# Patient Record
Sex: FEMALE | Race: WHITE | Employment: OTHER | URBAN - NONMETROPOLITAN AREA
[De-identification: names, ages, dates, MRNs, and addresses within clinical notes are randomized per-mention and may not be internally consistent; named-entity substitution may affect disease eponyms.]

---

## 2018-01-15 NOTE — LETTER
January 24, 2018      Sharon White  [address] [facility] [address]    Dear ,      I am happy to inform you that your recent cervical cancer screening test (PAP smear) was normal.      Preventative screenings such as this help to ensure your health for years to come. You should repeat a pap smear in 3 years, unless otherwise directed.      You will still need to return to the clinic every year for your annual exam and other preventive tests.     Please contact the clinic at [phone] if you have further questions.       Sincerely,      Mariposa Resendiz DO/[facility]       [place] Pre-Admission Testing Department  Phone: [phone]  Right Fax: [phone]  EVALUATION REQUEST PREOP    Sent By:  Farhana Bañuelos Date: 25    Patient Name: Юлия Charles  Surgery Date: 2025    CSN: [id number]  Medical Record: [id number]   : 1960 - A: 65 y      Sex: female    Surgeon(s):  YOVANA Gutierrez, Amadeo Alex MD    Procedure: anterior cervical 4- CERVICAL 5, cervical 5-CERVICAL 6, and cervical 6-CERVICAL 7 discectomy and fusion with cage, bone graft, plate/ screws; INTRAOPERATIVE NEURO MONITORING (BECCA) lumboperitoneal shunt ligation LEFT (DIMITRI), Right - Spine Cervical  INTRAOPERATIVE NEURO MONITORING, Right  lumboperitoneal shunt ligation, Left    Procedure Comments: anterior cervical 4- CERVICAL 5, cervical 5-CERVICAL 6, and cervical 6-CERVICAL 7 discectomy and fusion with cage, bone graft, plate/ screws; INTRAOPERATIVE NEURO MONITORING (BECCA) lumboperitoneal shunt ligation LEFT (DIMITRI)  Anesthesia Type: General      To PCP:        Fax #: [phone]      YOU SHOULD RECEIVE 1 PAGES (INCLUDING COVER SHEET)    ANESTHESIOLOGIST  HAS REQUESTED THE FOLLOWING:  NOTE OF MEDICAL CLEARANCE. Pt pre op lab w K+ 3.2-Please comment  _______________________________________________________________  Please note the following attached testing results for your review:   None    [facility]  [address] [place]  [address]  Phone: [phone]  Fax: [phone]  [email or web address]    STATEMENT OF CONFIDENTIALITY: This transmittal is intended only for the use of the individual entity to which is addressed and may contain information that is privileged and confidential. information contained. If the reader of this message IS NOT the intended recipient, you are hereby notified that any disclosure, distribution or copying of this information is strictly prohibited. If you have received this transmission in error, please notify  us immediately by telephone and return the original documents to us at the above address via the [employer] [place] Postal [employer]. Thank you.

## 2021-09-13 ENCOUNTER — OFFICE VISIT (OUTPATIENT)
Dept: FAMILY MEDICINE CLINIC | Facility: CLINIC | Age: 61
End: 2021-09-13
Payer: COMMERCIAL

## 2021-09-13 ENCOUNTER — LAB ENCOUNTER (OUTPATIENT)
Dept: LAB | Age: 61
End: 2021-09-13
Attending: FAMILY MEDICINE
Payer: COMMERCIAL

## 2021-09-13 VITALS
BODY MASS INDEX: 27.96 KG/M2 | RESPIRATION RATE: 16 BRPM | HEIGHT: 61.5 IN | OXYGEN SATURATION: 98 % | HEART RATE: 96 BPM | SYSTOLIC BLOOD PRESSURE: 138 MMHG | DIASTOLIC BLOOD PRESSURE: 80 MMHG | TEMPERATURE: 98 F | WEIGHT: 150 LBS

## 2021-09-13 DIAGNOSIS — D49.6 INTRACRANIAL TUMOR (HCC): ICD-10-CM

## 2021-09-13 DIAGNOSIS — Z86.2 HISTORY OF ANEMIA: ICD-10-CM

## 2021-09-13 DIAGNOSIS — Z00.00 ENCOUNTER FOR MEDICAL EXAMINATION TO ESTABLISH CARE: Primary | ICD-10-CM

## 2021-09-13 DIAGNOSIS — Z13.220 SCREENING, LIPID: ICD-10-CM

## 2021-09-13 DIAGNOSIS — Z90.13 HISTORY OF BILATERAL MASTECTOMY: ICD-10-CM

## 2021-09-13 DIAGNOSIS — Z00.00 LABORATORY EXAMINATION ORDERED AS PART OF A ROUTINE GENERAL MEDICAL EXAMINATION: ICD-10-CM

## 2021-09-13 DIAGNOSIS — Z13.0 SCREENING, ANEMIA, DEFICIENCY, IRON: ICD-10-CM

## 2021-09-13 DIAGNOSIS — Z13.1 SCREENING FOR DIABETES MELLITUS: ICD-10-CM

## 2021-09-13 LAB
BASOPHILS # BLD AUTO: 0.09 X10(3) UL (ref 0–0.2)
BASOPHILS NFR BLD AUTO: 0.9 %
EOSINOPHIL # BLD AUTO: 0.09 X10(3) UL (ref 0–0.7)
EOSINOPHIL NFR BLD AUTO: 0.9 %
ERYTHROCYTE [DISTWIDTH] IN BLOOD BY AUTOMATED COUNT: 13.4 %
HCT VFR BLD AUTO: 42.4 %
HGB BLD-MCNC: 12.9 G/DL
IMM GRANULOCYTES # BLD AUTO: 0.07 X10(3) UL (ref 0–1)
IMM GRANULOCYTES NFR BLD: 0.7 %
LYMPHOCYTES # BLD AUTO: 1.88 X10(3) UL (ref 1–4)
LYMPHOCYTES NFR BLD AUTO: 18.4 %
MCH RBC QN AUTO: 29.3 PG (ref 26–34)
MCHC RBC AUTO-ENTMCNC: 30.4 G/DL (ref 31–37)
MCV RBC AUTO: 96.1 FL
MONOCYTES # BLD AUTO: 0.55 X10(3) UL (ref 0.1–1)
MONOCYTES NFR BLD AUTO: 5.4 %
NEUTROPHILS # BLD AUTO: 7.51 X10 (3) UL (ref 1.5–7.7)
NEUTROPHILS # BLD AUTO: 7.51 X10(3) UL (ref 1.5–7.7)
NEUTROPHILS NFR BLD AUTO: 73.7 %
PLATELET # BLD AUTO: 405 10(3)UL (ref 150–450)
RBC # BLD AUTO: 4.41 X10(6)UL
WBC # BLD AUTO: 10.2 X10(3) UL (ref 4–11)

## 2021-09-13 PROCEDURE — 3075F SYST BP GE 130 - 139MM HG: CPT | Performed by: FAMILY MEDICINE

## 2021-09-13 PROCEDURE — 80061 LIPID PANEL: CPT | Performed by: FAMILY MEDICINE

## 2021-09-13 PROCEDURE — 80053 COMPREHEN METABOLIC PANEL: CPT | Performed by: FAMILY MEDICINE

## 2021-09-13 PROCEDURE — 85025 COMPLETE CBC W/AUTO DIFF WBC: CPT | Performed by: FAMILY MEDICINE

## 2021-09-13 PROCEDURE — 3079F DIAST BP 80-89 MM HG: CPT | Performed by: FAMILY MEDICINE

## 2021-09-13 PROCEDURE — 99203 OFFICE O/P NEW LOW 30 MIN: CPT | Performed by: FAMILY MEDICINE

## 2021-09-13 PROCEDURE — 3008F BODY MASS INDEX DOCD: CPT | Performed by: FAMILY MEDICINE

## 2021-09-14 LAB
ALBUMIN SERPL-MCNC: 3.8 G/DL (ref 3.4–5)
ALBUMIN/GLOB SERPL: 0.9 {RATIO} (ref 1–2)
ALP LIVER SERPL-CCNC: 100 U/L
ALT SERPL-CCNC: 31 U/L
ANION GAP SERPL CALC-SCNC: 5 MMOL/L (ref 0–18)
AST SERPL-CCNC: 19 U/L (ref 15–37)
BILIRUB SERPL-MCNC: 0.3 MG/DL (ref 0.1–2)
BUN BLD-MCNC: 22 MG/DL (ref 7–18)
CALCIUM BLD-MCNC: 9.5 MG/DL (ref 8.5–10.1)
CHLORIDE SERPL-SCNC: 106 MMOL/L (ref 98–112)
CHOLEST SMN-MCNC: 245 MG/DL (ref ?–200)
CO2 SERPL-SCNC: 29 MMOL/L (ref 21–32)
CREAT BLD-MCNC: 0.63 MG/DL
GLOBULIN PLAS-MCNC: 4.4 G/DL (ref 2.8–4.4)
GLUCOSE BLD-MCNC: 80 MG/DL (ref 70–99)
HDLC SERPL-MCNC: 42 MG/DL (ref 40–59)
LDLC SERPL CALC-MCNC: 127 MG/DL (ref ?–100)
M PROTEIN MFR SERPL ELPH: 8.2 G/DL (ref 6.4–8.2)
NONHDLC SERPL-MCNC: 203 MG/DL (ref ?–130)
OSMOLALITY SERPL CALC.SUM OF ELEC: 292 MOSM/KG (ref 275–295)
POTASSIUM SERPL-SCNC: 4 MMOL/L (ref 3.5–5.1)
SODIUM SERPL-SCNC: 140 MMOL/L (ref 136–145)
TRIGL SERPL-MCNC: 427 MG/DL (ref 30–149)
VLDLC SERPL CALC-MCNC: 79 MG/DL (ref 0–30)

## 2021-09-14 NOTE — PROGRESS NOTES
Subjective:   Leonardo Gilbert is a 57year old female who presents for Establish Care     Here to establish care    Has not had some tests over 6 months    She has a long history  But is feeling well at the moment    She states she needs to change pcp removed in the past  no sequelae--benign          Mat Bo MD, 9/14/2021, 2:36 PM

## 2021-09-15 ENCOUNTER — MED REC SCAN ONLY (OUTPATIENT)
Dept: FAMILY MEDICINE CLINIC | Facility: CLINIC | Age: 61
End: 2021-09-15

## 2021-10-07 ENCOUNTER — OFFICE VISIT (OUTPATIENT)
Dept: FAMILY MEDICINE CLINIC | Facility: CLINIC | Age: 61
End: 2021-10-07
Payer: COMMERCIAL

## 2021-10-07 VITALS
OXYGEN SATURATION: 98 % | WEIGHT: 152 LBS | DIASTOLIC BLOOD PRESSURE: 68 MMHG | SYSTOLIC BLOOD PRESSURE: 139 MMHG | HEART RATE: 100 BPM | RESPIRATION RATE: 16 BRPM | TEMPERATURE: 98 F | BODY MASS INDEX: 28.33 KG/M2 | HEIGHT: 61.5 IN

## 2021-10-07 DIAGNOSIS — R09.81 NASAL CONGESTION: ICD-10-CM

## 2021-10-07 DIAGNOSIS — J02.9 SORE THROAT: ICD-10-CM

## 2021-10-07 DIAGNOSIS — R05.9 COUGH: ICD-10-CM

## 2021-10-07 DIAGNOSIS — R68.89 FLU-LIKE SYMPTOMS: Primary | ICD-10-CM

## 2021-10-07 DIAGNOSIS — J44.9 CHRONIC OBSTRUCTIVE PULMONARY DISEASE, UNSPECIFIED COPD TYPE (HCC): ICD-10-CM

## 2021-10-07 PROCEDURE — 3078F DIAST BP <80 MM HG: CPT | Performed by: FAMILY MEDICINE

## 2021-10-07 PROCEDURE — 3008F BODY MASS INDEX DOCD: CPT | Performed by: FAMILY MEDICINE

## 2021-10-07 PROCEDURE — 3075F SYST BP GE 130 - 139MM HG: CPT | Performed by: FAMILY MEDICINE

## 2021-10-07 PROCEDURE — 87880 STREP A ASSAY W/OPTIC: CPT | Performed by: FAMILY MEDICINE

## 2021-10-07 PROCEDURE — 99213 OFFICE O/P EST LOW 20 MIN: CPT | Performed by: FAMILY MEDICINE

## 2021-10-07 RX ORDER — BENZONATATE 200 MG/1
200 CAPSULE ORAL 3 TIMES DAILY PRN
Qty: 30 CAPSULE | Refills: 0 | Status: SHIPPED | OUTPATIENT
Start: 2021-10-07

## 2021-10-07 RX ORDER — OSELTAMIVIR PHOSPHATE 75 MG/1
75 CAPSULE ORAL 2 TIMES DAILY
Qty: 10 CAPSULE | Refills: 0 | Status: SHIPPED | OUTPATIENT
Start: 2021-10-07

## 2021-10-07 RX ORDER — ALBUTEROL SULFATE 90 UG/1
2 AEROSOL, METERED RESPIRATORY (INHALATION) EVERY 4 HOURS PRN
Qty: 1 EACH | Refills: 1 | Status: SHIPPED | OUTPATIENT
Start: 2021-10-07

## 2021-10-07 NOTE — PROGRESS NOTES
Sharon Russo is a 57year old female. S:  Patient presents today with the following concerns:  · Felt feverish last night-awoke her with chills, sudden onset. Felt fine all day yesterday. Took Tylenol and felt better.   · Sore throat, cough, nasal c normal.  SKIN: no rashes,no suspicious lesions  HEENT: atraumatic, normocephalic  Bilateral TM's and ear canals are normal.  Pharynx with mild erythema and cobblestoning.   EYES:PERRLA, EOMI  NECK: supple,no adenopathy  LUNGS: CTA, no RRW  CARDIO: RRR witho

## 2021-10-09 ENCOUNTER — TELEPHONE (OUTPATIENT)
Dept: TELEHEALTH | Age: 61
End: 2021-10-09

## 2021-10-09 DIAGNOSIS — U07.1 COVID-19: Primary | ICD-10-CM

## 2021-10-09 NOTE — TELEPHONE ENCOUNTER
Patient contacted to advise that she tested Positive for COVID. Patient still has a HA, coughing jags and fatigue. Discussed PAB infusion at length. Patient would like this ordered and planned to page her PCP for his advise.   Patient does not have email

## 2021-10-11 ENCOUNTER — NURSE ONLY (OUTPATIENT)
Dept: INFUSION CENTER | Age: 61
End: 2021-10-11
Attending: PREVENTIVE MEDICINE
Payer: COMMERCIAL

## 2021-10-11 ENCOUNTER — TELEPHONE (OUTPATIENT)
Dept: CASE MANAGEMENT | Age: 61
End: 2021-10-11

## 2021-10-11 VITALS
SYSTOLIC BLOOD PRESSURE: 136 MMHG | HEART RATE: 72 BPM | TEMPERATURE: 98 F | OXYGEN SATURATION: 97 % | RESPIRATION RATE: 16 BRPM | DIASTOLIC BLOOD PRESSURE: 68 MMHG

## 2021-10-11 NOTE — PROGRESS NOTES
Patient arrived for monoclonal antibody infusion. PIV started and Regen cov given. Call light within reach. VS rechecked per protocol. Pt tolerated infusion well, no adverse reactions noted. Infusion line flushed per protocol with 0.9NS.   PIV discontinu

## 2021-10-11 NOTE — TELEPHONE ENCOUNTER
Pt received PAB infusion at [address] [place] [address] on 10-11-21 for COVID-19. Please follow-up with pt for post-infusion assessment and home monitoring if needed. Thank you.

## 2021-10-12 NOTE — TELEPHONE ENCOUNTER
Home Monitoring Condition Update    Covid19+ test date: 10/07/2021    Consent Verification:  Assessment Completed With: Patient  HIPAA Verified? Yes    COVID-19 HOME MONITORING 10/12/2021   How are you feeling Patient feels better.  She does not have equip

## 2021-10-13 NOTE — TELEPHONE ENCOUNTER
Home Monitoring Condition Update    Covid19+ test date: 10/07/2021    Consent Verification:  Assessment Completed With: Patient  HIPAA Verified?   Yes    COVID-19 HOME MONITORING 10/13/2021   How are you feeling Feeling \"OK\" does thin she is any better

## 2021-10-14 NOTE — TELEPHONE ENCOUNTER
Home Monitoring Condition Update    Covid19+ test date: 10/7/21    Consent Verification:  Assessment Completed With: Patient  HIPAA Verified?   Yes    COVID-19 HOME MONITORING 10/13/2021   How are you feeling Feeling \"OK\" does thin she is any better   Terri

## 2021-10-15 NOTE — TELEPHONE ENCOUNTER
Home Monitoring Condition Update    Covid19+ test date: 10/07/2021    Consent Verification:  Assessment Completed With: Patient  HIPAA Verified?   Yes    COVID-19 HOME MONITORING 10/15/2021   Temperature -   Reading From -   How are you feeling Feeling the

## 2021-11-26 ENCOUNTER — TELEPHONE (OUTPATIENT)
Dept: FAMILY MEDICINE CLINIC | Facility: CLINIC | Age: 61
End: 2021-11-26

## 2021-11-26 NOTE — TELEPHONE ENCOUNTER
Request for medical records received from [facility]. Requesting records from 6/29/2020 to present. No EMR records.  Forwarded to [employer] 11/26/21

## 2024-07-11 ENCOUNTER — OFFICE VISIT (OUTPATIENT)
Dept: FAMILY MEDICINE CLINIC | Facility: CLINIC | Age: 64
End: 2024-07-11
Payer: MEDICARE

## 2024-07-11 ENCOUNTER — LAB ENCOUNTER (OUTPATIENT)
Dept: LAB | Age: 64
End: 2024-07-11
Attending: NURSE PRACTITIONER
Payer: MEDICARE

## 2024-07-11 ENCOUNTER — HOSPITAL ENCOUNTER (OUTPATIENT)
Dept: GENERAL RADIOLOGY | Age: 64
Discharge: HOME | End: 2024-07-11
Attending: NURSE PRACTITIONER
Payer: MEDICARE

## 2024-07-11 VITALS
DIASTOLIC BLOOD PRESSURE: 86 MMHG | OXYGEN SATURATION: 98 % | TEMPERATURE: 98 F | HEART RATE: 89 BPM | SYSTOLIC BLOOD PRESSURE: 130 MMHG | BODY MASS INDEX: 27.05 KG/M2 | WEIGHT: 147 LBS | HEIGHT: 62 IN

## 2024-07-11 DIAGNOSIS — R41.3 MEMORY CHANGES: ICD-10-CM

## 2024-07-11 DIAGNOSIS — J44.9 CHRONIC OBSTRUCTIVE PULMONARY DISEASE, UNSPECIFIED COPD TYPE (HCC): ICD-10-CM

## 2024-07-11 DIAGNOSIS — Z13.220 SCREENING CHOLESTEROL LEVEL: ICD-10-CM

## 2024-07-11 DIAGNOSIS — K52.9 CHRONIC DIARRHEA: ICD-10-CM

## 2024-07-11 DIAGNOSIS — R35.0 URINARY FREQUENCY: ICD-10-CM

## 2024-07-11 DIAGNOSIS — Z23 NEED FOR VACCINATION: ICD-10-CM

## 2024-07-11 DIAGNOSIS — R52 GENERALIZED BODY ACHES: ICD-10-CM

## 2024-07-11 DIAGNOSIS — R06.02 SOB (SHORTNESS OF BREATH) ON EXERTION: ICD-10-CM

## 2024-07-11 DIAGNOSIS — Z86.011 HISTORY OF BENIGN BRAIN TUMOR: ICD-10-CM

## 2024-07-11 DIAGNOSIS — R53.83 OTHER FATIGUE: ICD-10-CM

## 2024-07-11 DIAGNOSIS — Z00.00 ENCOUNTER FOR ANNUAL HEALTH EXAMINATION: ICD-10-CM

## 2024-07-11 DIAGNOSIS — E66.3 OVERWEIGHT (BMI 25.0-29.9): ICD-10-CM

## 2024-07-11 DIAGNOSIS — Z12.11 COLON CANCER SCREENING: ICD-10-CM

## 2024-07-11 DIAGNOSIS — Z13.820 SCREENING FOR OSTEOPOROSIS: ICD-10-CM

## 2024-07-11 DIAGNOSIS — Z76.89 ENCOUNTER TO ESTABLISH CARE: Primary | ICD-10-CM

## 2024-07-11 DIAGNOSIS — Z78.0 MENOPAUSE: ICD-10-CM

## 2024-07-11 PROBLEM — D49.6 INTRACRANIAL TUMOR (HCC): Status: RESOLVED | Noted: 2021-09-13 | Resolved: 2024-07-11

## 2024-07-11 LAB
ATRIAL RATE: 76 BPM
P AXIS: 66 DEGREES
P-R INTERVAL: 168 MS
Q-T INTERVAL: 360 MS
QRS DURATION: 76 MS
QTC CALCULATION (BEZET): 405 MS
R AXIS: 19 DEGREES
T AXIS: -11 DEGREES
VENTRICULAR RATE: 76 BPM
VIT B12 SERPL-MCNC: 579 PG/ML (ref 193–986)

## 2024-07-11 PROCEDURE — 36415 COLL VENOUS BLD VENIPUNCTURE: CPT

## 2024-07-11 PROCEDURE — 71046 X-RAY EXAM CHEST 2 VIEWS: CPT | Performed by: NURSE PRACTITIONER

## 2024-07-11 PROCEDURE — 82607 VITAMIN B-12: CPT

## 2024-07-11 RX ORDER — ACETAMINOPHEN 325 MG/1
325 TABLET ORAL EVERY 6 HOURS PRN
COMMUNITY

## 2024-07-11 RX ORDER — ALBUTEROL SULFATE 90 UG/1
2 AEROSOL, METERED RESPIRATORY (INHALATION) EVERY 4 HOURS PRN
Qty: 1 EACH | Refills: 1 | Status: SHIPPED | OUTPATIENT
Start: 2024-07-11

## 2024-07-11 NOTE — PROGRESS NOTES
Subjective:   Юлия Charles is a 64 year old female who presents for a Medicare Initial Preventative Physical Exam (Welcome to Medicare- < 12 months on Medicare) and  to establish care .     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 Had removal done around 2008.     Concerns:  Struggling with memory issues. Started around age 58 when she was laid off work. Forgetful and does space out. Family has noticed as well. No falls or injuries. Does not feel depressed or anxious.  History of COPD. Had history of lung infection and partial lung resection at [facility]. Does get shortness of breath with exertion. Using Albuterol as needed. Didn't get daily inhaler due to cost    Going to [place] next month for group tour.     History/Other:   Fall Risk Assessment:   She has been screened for Falls and is low risk.      Cognitive Assessment:   She had a completely normal cognitive assessment - see flowsheet entries     Functional Ability/Status:   Юлия Charles has some abnormal functions as listed below:  She has Driving difficulties based on screening of functional status. She has difficulties Affording Meds based on screening of functional status.       Depression Screening (PHQ):  PHQ-2 SCORE: 0  , done 7/11/2024     Advanced Directives:   She does NOT have a Living Will. [Do you have a living will?: No]  She does NOT have a Power of  for Health Care. [Do you have a healthcare power of ?: No]  Not discussed      Patient Active Problem List   Diagnosis    Diverticulosis of large intestine without hemorrhage    Chronic obstructive pulmonary disease (HCC)     Allergies:  She is allergic to amoxicillin and tamoxifen.    Current Medications:  Outpatient Medications Marked as Taking for the 7/11/24 encounter (Office Visit) with Adriana Peng APRN   Medication Sig    acetaminophen 325 MG Oral Tab Take 1 tablet (325 mg total) by mouth every 6 (six) hours as needed for Pain.     albuterol (PROAIR HFA) 108 (90 Base) MCG/ACT Inhalation Aero Soln Inhale 2 puffs into the lungs every 4 (four) hours as needed for Wheezing or Shortness of Breath.       Medical History:  She  has a past medical history of Breast cancer, right breast (HCC) (10/24/2013), COPD (chronic obstructive pulmonary disease) (HCC), and Intracranial tumor (HCC) (2021).  Surgical History:  She  has a past surgical history that includes wom w/ cydney mastec or hx of mastec; hysterectomy; tonsillectomy; and Brain Surgery ().   Family History:  Her family history includes Diabetes in her sister; Heart Disease in her father and sister; Substance Abuse in her son.  Social History:  She  reports that she quit smoking about 5 years ago. Her smoking use included cigarettes. She has never used smokeless tobacco. She reports that she does not currently use alcohol. She reports that she does not currently use drugs.    Tobacco:  She smoked tobacco in the past but quit greater than 12 months ago.  Social History     Tobacco Use   Smoking Status Former    Current packs/day: 0.00    Types: Cigarettes    Quit date: 2018    Years since quittin.8   Smokeless Tobacco Never          CAGE Alcohol Screen:   CAGE screening score of 0 on 2024, showing low risk of alcohol abuse.      Patient Care Team:  Nathalie Chavarria MD as PCP - General (Family Medicine)  Adirana Peng APRN (Nurse Practitioner Family)    Review of Systems   Constitutional:  Negative for appetite change, chills, fatigue, fever and unexpected weight change.   HENT:  Negative for congestion, ear pain, postnasal drip, rhinorrhea, sore throat and trouble swallowing.    Respiratory:  Positive for cough (increased last couple of weeks) and shortness of breath. Negative for chest tightness and wheezing.    Cardiovascular:  Negative for chest pain and palpitations.   Gastrointestinal:  Positive for diarrhea (occasionally, since lung surgery in 2018). Negative for  abdominal pain, constipation, nausea and vomiting.        Last colonoscopy 7-8 years ago, done in [place], overdue for repeat   Endocrine: Negative for cold intolerance and heat intolerance.   Genitourinary:  Positive for frequency. Negative for dysuria and hematuria.   Musculoskeletal:  Positive for myalgias.   Skin:  Negative for rash.   Neurological:  Positive for headaches.        + memory issues   Psychiatric/Behavioral:  Negative for dysphoric mood and sleep disturbance. The patient is not nervous/anxious.        Objective:   Physical Exam  Constitutional:       General: She is not in acute distress.     Appearance: Normal appearance. She is well-developed, well-groomed and overweight. She is not ill-appearing.   HENT:      Right Ear: Tympanic membrane, ear canal and external ear normal.      Left Ear: Tympanic membrane, ear canal and external ear normal.      Nose: Nose normal.      Mouth/Throat:      Mouth: Mucous membranes are moist.      Pharynx: Oropharynx is clear.   Eyes:      Conjunctiva/sclera: Conjunctivae normal.      Pupils: Pupils are equal, round, and reactive to light.   Neck:      Thyroid: No thyromegaly.   Cardiovascular:      Rate and Rhythm: Normal rate and regular rhythm.      Heart sounds: Normal heart sounds.   Pulmonary:      Effort: Pulmonary effort is normal.      Breath sounds: Normal breath sounds. No wheezing or rhonchi.   Abdominal:      General: Bowel sounds are normal.      Palpations: Abdomen is soft.      Tenderness: There is no abdominal tenderness.   Musculoskeletal:      Cervical back: Normal range of motion.   Skin:     General: Skin is warm and dry.   Neurological:      General: No focal deficit present.      Mental Status: She is alert and oriented to person, place, and time.      Gait: Gait normal.   Psychiatric:         Mood and Affect: Mood normal.         Judgment: Judgment normal.         /86   Pulse 89   Temp 98.3 °F (36.8 °C)   Ht 5' 2\" (1.575 m)   Wt 147  lb (66.7 kg)   SpO2 98%   BMI 26.89 kg/m²  Estimated body mass index is 26.89 kg/m² as calculated from the following:    Height as of this encounter: 5' 2\" (1.575 m).    Weight as of this encounter: 147 lb (66.7 kg).    Medicare Hearing Assessment:   Hearing Screening    Time taken: 7/11/2024  1:15 PM  Entry User: Heyde, Jordan, MA  Screening Method: Finger Rub  Finger Rub Result: Pass         Visual Acuity:   Right Eye Visual Acuity: Corrected Right Eye Chart Acuity: 20/60   Left Eye Visual Acuity: Corrected Left Eye Chart Acuity: 20/40   Both Eyes Visual Acuity: Corrected Both Eyes Chart Acuity: 20/40   Able To Tolerate Visual Acuity: Yes        Assessment & Plan:   Юлия Charles is a 64 year old female who presents for a Medicare Assessment.     1. Encounter to establish care (Primary)  2. Encounter for annual health examination  -     EKG in office, for IPPE (Welcome to Medicare/ Initial Preventative Exam) for new MUSE system  3. Screening for osteoporosis  -     XR DEXA BONE DENSITOMETRY (CPT=77080); Future; Expected date: 07/11/2024  4. Need for vaccination  -     Prevnar 20 (PCV20) [96495]  5. Memory changes  Comments:  recommend lab work, head CT and evaluation with neurology  Orders:  -     Vitamin B12; Future; Expected date: 07/11/2024  -     CT BRAIN OR HEAD (88142); Future; Expected date: 07/11/2024  -     Neuro Referral - In Network  6. Generalized body aches  Comments:  check lab work  7. Menopause  -     XR DEXA BONE DENSITOMETRY (CPT=77080); Future; Expected date: 07/11/2024  8. Chronic obstructive pulmonary disease, unspecified COPD type (HCC)  Comments:  PRN Albuterol. Check chest xray and PFTs. Establish with pulm  Orders:  -     Prevnar 20 (PCV20) [03974]  -     XR CHEST PA + LAT CHEST (CPT=71046); Future; Expected date: 07/11/2024  -     Pulmonary Function Test; Future; Expected date: 07/11/2024  -     Pulmonary Referral - In Network  -     Albuterol Sulfate HFA; Inhale 2 puffs into the  lungs every 4 (four) hours as needed for Wheezing or Shortness of Breath.  Dispense: 1 each; Refill: 1  9. SOB (shortness of breath) on exertion  -     Pulmonary Referral - In Network  10. History of benign brain tumor  -     CT BRAIN OR HEAD (55941); Future; Expected date: 07/11/2024  -     Neuro Referral - In Network  11. Other fatigue  Comments:  check lab work  12. Colon cancer screening  -     Gastro Referral - In Network  13. Chronic diarrhea  -     Gastro Referral - In Network  14. Urinary frequency  Comments:  check lab work    The patient indicates understanding of these issues and agrees to the plan.  Consult ordered.  Imaging studies ordered.  Lab work ordered.  Prescription medication ordered.  Reinforced healthy diet, lifestyle, and exercise.      No follow-ups on file.     Adriana Peng, APRN, 7/11/2024     Supplementary Documentation:   General Health:  In the past six months, have you lost more than 10 pounds without trying?: 2 - No  Has your appetite been poor?: No  Type of Diet: Balanced  How does the patient maintain a good energy level?: Daily Walks  How would you describe your daily physical activity?: Moderate  How would you describe your current health state?: Fair  How do you maintain positive mental well-being?: Visiting Family;Games  On a scale of 0 to 10, with 0 being no pain and 10 being severe pain, what is your pain level?: 3 - (Mild)  In the past six months, have you experienced urine leakage?: 0-No  At any time do you feel concerned for the safety/well-being of yourself and/or your children, in your home or elsewhere?: No  Have you had any immunizations at another office such as Influenza, Hepatitis B, Tetanus, or Pneumococcal?: No    Health Maintenance   Topic Date Due    Annual Physical  Never done    Zoster Vaccines (1 of 2) Never done    Pneumococcal Vaccine: 65+ Years (2 of 2 - PCV) 05/29/2021    COVID-19 Vaccine (3 - 2023-24 season) 09/01/2023    Annual Depression Screening   Never done    Fall Risk Screening (Annual)  Never done    DEXA Scan  Never done    Influenza Vaccine (1) 10/01/2024    Colorectal Cancer Screening  09/13/2026

## 2024-07-16 ENCOUNTER — LABORATORY ENCOUNTER (OUTPATIENT)
Dept: LAB | Age: 64
End: 2024-07-16
Attending: NURSE PRACTITIONER
Payer: MEDICARE

## 2024-07-16 DIAGNOSIS — J44.9 CHRONIC OBSTRUCTIVE PULMONARY DISEASE, UNSPECIFIED COPD TYPE (HCC): ICD-10-CM

## 2024-07-16 DIAGNOSIS — R41.3 MEMORY CHANGES: ICD-10-CM

## 2024-07-16 DIAGNOSIS — Z13.220 SCREENING CHOLESTEROL LEVEL: ICD-10-CM

## 2024-07-16 DIAGNOSIS — E66.3 OVERWEIGHT (BMI 25.0-29.9): ICD-10-CM

## 2024-07-16 DIAGNOSIS — K52.9 CHRONIC DIARRHEA: ICD-10-CM

## 2024-07-16 DIAGNOSIS — Z00.00 ENCOUNTER FOR ANNUAL HEALTH EXAMINATION: ICD-10-CM

## 2024-07-16 DIAGNOSIS — R53.83 OTHER FATIGUE: ICD-10-CM

## 2024-07-16 LAB
BASOPHILS # BLD AUTO: 0.06 X10(3) UL (ref 0–0.2)
BASOPHILS NFR BLD AUTO: 1.1 %
EOSINOPHIL # BLD AUTO: 0.06 X10(3) UL (ref 0–0.7)
EOSINOPHIL NFR BLD AUTO: 1.1 %
ERYTHROCYTE [DISTWIDTH] IN BLOOD BY AUTOMATED COUNT: 13 %
HCT VFR BLD AUTO: 42.6 %
HGB BLD-MCNC: 13.9 G/DL
IMM GRANULOCYTES # BLD AUTO: 0.01 X10(3) UL (ref 0–1)
IMM GRANULOCYTES NFR BLD: 0.2 %
LYMPHOCYTES # BLD AUTO: 1.39 X10(3) UL (ref 1–4)
LYMPHOCYTES NFR BLD AUTO: 26.6 %
MCH RBC QN AUTO: 30.6 PG (ref 26–34)
MCHC RBC AUTO-ENTMCNC: 32.6 G/DL (ref 31–37)
MCV RBC AUTO: 93.8 FL
MONOCYTES # BLD AUTO: 0.33 X10(3) UL (ref 0.1–1)
MONOCYTES NFR BLD AUTO: 6.3 %
NEUTROPHILS # BLD AUTO: 3.37 X10 (3) UL (ref 1.5–7.7)
NEUTROPHILS # BLD AUTO: 3.37 X10(3) UL (ref 1.5–7.7)
NEUTROPHILS NFR BLD AUTO: 64.7 %
PLATELET # BLD AUTO: 311 10(3)UL (ref 150–450)
RBC # BLD AUTO: 4.54 X10(6)UL
WBC # BLD AUTO: 5.2 X10(3) UL (ref 4–11)

## 2024-07-16 PROCEDURE — 36415 COLL VENOUS BLD VENIPUNCTURE: CPT

## 2024-07-16 PROCEDURE — 80061 LIPID PANEL: CPT

## 2024-07-16 PROCEDURE — 80053 COMPREHEN METABOLIC PANEL: CPT

## 2024-07-16 PROCEDURE — 84439 ASSAY OF FREE THYROXINE: CPT

## 2024-07-16 PROCEDURE — 85025 COMPLETE CBC W/AUTO DIFF WBC: CPT

## 2024-07-16 PROCEDURE — 84443 ASSAY THYROID STIM HORMONE: CPT

## 2024-07-17 DIAGNOSIS — E78.2 MIXED HYPERLIPIDEMIA: Primary | ICD-10-CM

## 2024-07-17 DIAGNOSIS — E78.00 ELEVATED CHOLESTEROL: Primary | ICD-10-CM

## 2024-07-17 LAB
ALBUMIN SERPL-MCNC: 4.4 G/DL (ref 3.2–4.8)
ALBUMIN/GLOB SERPL: 1.5 {RATIO} (ref 1–2)
ALP LIVER SERPL-CCNC: 73 U/L
ALT SERPL-CCNC: 23 U/L
ANION GAP SERPL CALC-SCNC: 8 MMOL/L (ref 0–18)
AST SERPL-CCNC: 18 U/L (ref ?–34)
BILIRUB SERPL-MCNC: 0.5 MG/DL (ref 0.2–1.1)
BUN BLD-MCNC: 14 MG/DL (ref 9–23)
CALCIUM BLD-MCNC: 9.6 MG/DL (ref 8.7–10.4)
CHLORIDE SERPL-SCNC: 105 MMOL/L (ref 98–112)
CHOLEST SERPL-MCNC: 215 MG/DL (ref ?–200)
CO2 SERPL-SCNC: 29 MMOL/L (ref 21–32)
CREAT BLD-MCNC: 0.69 MG/DL
EGFRCR SERPLBLD CKD-EPI 2021: 96 ML/MIN/1.73M2 (ref 60–?)
FASTING PATIENT LIPID ANSWER: YES
FASTING STATUS PATIENT QL REPORTED: YES
GLOBULIN PLAS-MCNC: 3 G/DL (ref 2.8–4.4)
GLUCOSE BLD-MCNC: 94 MG/DL (ref 70–99)
HDLC SERPL-MCNC: 46 MG/DL (ref 40–59)
LDLC SERPL CALC-MCNC: 131 MG/DL (ref ?–100)
NONHDLC SERPL-MCNC: 169 MG/DL (ref ?–130)
OSMOLALITY SERPL CALC.SUM OF ELEC: 294 MOSM/KG (ref 275–295)
POTASSIUM SERPL-SCNC: 3.8 MMOL/L (ref 3.5–5.1)
PROT SERPL-MCNC: 7.4 G/DL (ref 5.7–8.2)
SODIUM SERPL-SCNC: 142 MMOL/L (ref 136–145)
T4 FREE SERPL-MCNC: 1.4 NG/DL (ref 0.8–1.7)
TRIGL SERPL-MCNC: 216 MG/DL (ref 30–149)
TSI SER-ACNC: 0.8 MIU/ML (ref 0.55–4.78)
VLDLC SERPL CALC-MCNC: 39 MG/DL (ref 0–30)

## 2024-07-17 RX ORDER — ATORVASTATIN CALCIUM 10 MG/1
10 TABLET, FILM COATED ORAL NIGHTLY
Qty: 90 TABLET | Refills: 0 | Status: SHIPPED | OUTPATIENT
Start: 2024-07-17 | End: 2024-10-15

## 2024-07-22 ENCOUNTER — TELEPHONE (OUTPATIENT)
Dept: FAMILY MEDICINE CLINIC | Facility: CLINIC | Age: 64
End: 2024-07-22

## 2024-08-02 ENCOUNTER — TELEPHONE (OUTPATIENT)
Dept: FAMILY MEDICINE CLINIC | Facility: CLINIC | Age: 64
End: 2024-08-02

## 2024-08-02 ENCOUNTER — HOSPITAL ENCOUNTER (OUTPATIENT)
Dept: CT IMAGING | Facility: HOSPITAL | Age: 64
Discharge: HOME | End: 2024-08-02
Attending: NURSE PRACTITIONER
Payer: MEDICARE

## 2024-08-02 ENCOUNTER — HOSPITAL ENCOUNTER (OUTPATIENT)
Dept: RESPIRATORY THERAPY | Facility: HOSPITAL | Age: 64
Discharge: HOME | End: 2024-08-02
Attending: NURSE PRACTITIONER
Payer: MEDICARE

## 2024-08-02 DIAGNOSIS — Z86.011 HISTORY OF BENIGN BRAIN TUMOR: ICD-10-CM

## 2024-08-02 DIAGNOSIS — R41.3 MEMORY CHANGES: ICD-10-CM

## 2024-08-02 DIAGNOSIS — J44.9 CHRONIC OBSTRUCTIVE PULMONARY DISEASE, UNSPECIFIED COPD TYPE (HCC): ICD-10-CM

## 2024-08-02 PROCEDURE — 94726 PLETHYSMOGRAPHY LUNG VOLUMES: CPT

## 2024-08-02 PROCEDURE — 70450 CT HEAD/BRAIN W/O DYE: CPT | Performed by: NURSE PRACTITIONER

## 2024-08-02 PROCEDURE — 94060 EVALUATION OF WHEEZING: CPT

## 2024-08-02 PROCEDURE — 94729 DIFFUSING CAPACITY: CPT

## 2024-08-02 NOTE — TELEPHONE ENCOUNTER
Patient's  called and states he doesn't understand how he can get the outside CT images. Patient's  advised to fill out medical records request. Patient's  states they already did this. Patient's  states he would like to speak with clinical staff to clarify what they need to do. Please advise

## 2024-08-02 NOTE — TELEPHONE ENCOUNTER
Patient and patient's  advised and verbalized understanding.  Patient has not yet schedule with neuro but will.  She will obtain previous CT images and report from old PCP..

## 2024-08-02 NOTE — TELEPHONE ENCOUNTER
Spoke with patient's  - patient will sign medical release form at upcoming appointment for previous CT imaging

## 2024-08-02 NOTE — TELEPHONE ENCOUNTER
----- Message from Adriana Peng sent at 8/2/2024  9:18 AM CDT -----  No acute process  Would like outside CT images for comparison. Please have patient previous facility send disc with images and report.  Scattered intracranial atherosclerosis. Recommend discussing brain MRI/MRA possibility with neurology. Did she schedule appointment?

## 2024-08-02 NOTE — PROCEDURES
[facility]  part of [facility]     Pulmonary Function Test     Юлия Charles Patient Status:  Outpatient    1960 MRN [id number]   Date of Exam 24 PCP Nathalie Chavarria MD           Spirometry   FEV1: 1.94 97%  FVC: 2.38 94%  FEV1/FVC: 0.81    Lung Volume   TLC: 3.70 82%  RV : 1.31 79%    Diffusion Capacity   DLCO: 14.29 80%    Flow Volume Loop       Impression   No evidence of obstructive defect seen without significant postbronchodilator response observed.  Normal lung volumes.  Normal diffusion capacity.    Blanca Kelly DO  Pulmonary Critical Care Medicine  [facility]

## 2024-08-03 ENCOUNTER — TELEPHONE (OUTPATIENT)
Dept: FAMILY MEDICINE CLINIC | Facility: CLINIC | Age: 64
End: 2024-08-03

## 2024-08-03 NOTE — TELEPHONE ENCOUNTER
----- Message from Adriana Peng sent at 8/3/2024  8:46 AM CDT -----  Results reviewed. Pulmonary function testing is normal

## 2024-08-05 ENCOUNTER — OFFICE VISIT (OUTPATIENT)
Dept: FAMILY MEDICINE CLINIC | Facility: CLINIC | Age: 64
End: 2024-08-05
Payer: MEDICARE

## 2024-08-05 VITALS
TEMPERATURE: 97 F | OXYGEN SATURATION: 98 % | WEIGHT: 146 LBS | DIASTOLIC BLOOD PRESSURE: 76 MMHG | HEART RATE: 86 BPM | RESPIRATION RATE: 20 BRPM | SYSTOLIC BLOOD PRESSURE: 124 MMHG | BODY MASS INDEX: 27 KG/M2

## 2024-08-05 DIAGNOSIS — R06.02 SOB (SHORTNESS OF BREATH) ON EXERTION: Primary | ICD-10-CM

## 2024-08-05 DIAGNOSIS — E78.2 MIXED HYPERLIPIDEMIA: ICD-10-CM

## 2024-08-05 DIAGNOSIS — R53.83 OTHER FATIGUE: ICD-10-CM

## 2024-08-05 DIAGNOSIS — R41.3 MEMORY CHANGES: ICD-10-CM

## 2024-08-05 PROCEDURE — 99213 OFFICE O/P EST LOW 20 MIN: CPT | Performed by: NURSE PRACTITIONER

## 2024-08-05 NOTE — PROGRESS NOTES
HPI:     Patient is here for 1 month follow up. Patient completed head CT and will be seeing neurology  due to memory changes. Also had pulmonary function testing done and there was no airway obstruction. Continues to have fatigue and shortness of breath with exertion. No chest pain. Vitamin D lab not covered by insurance.    Current Outpatient Medications   Medication Sig Dispense Refill    atorvastatin 10 MG Oral Tab Take 1 tablet (10 mg total) by mouth nightly. 90 tablet 0    acetaminophen 325 MG Oral Tab Take 1 tablet (325 mg total) by mouth every 6 (six) hours as needed for Pain.      albuterol (PROAIR HFA) 108 (90 Base) MCG/ACT Inhalation Aero Soln Inhale 2 puffs into the lungs every 4 (four) hours as needed for Wheezing or Shortness of Breath. 1 each 1      Past Medical History:    Breast cancer, right breast (HCC)    Formatting of this note might be different from the original.  S/p b/l mastectomy  In . On Aromacin      COPD (chronic obstructive pulmonary disease) (HCC)    Intracranial tumor (HCC)      Past Surgical History:   Procedure Laterality Date    Brain surgery      removal brain tumor    Hysterectomy      Tonsillectomy      Wom w/ cydney mastec or hx of mastec        Family History   Problem Relation Age of Onset    Heart Disease Father     Substance Abuse Son     Diabetes Sister     Heart Disease Sister       Social History     Socioeconomic History    Marital status:    Tobacco Use    Smoking status: Former     Current packs/day: 0.00     Types: Cigarettes     Quit date: 2018     Years since quittin.8    Smokeless tobacco: Never   Vaping Use    Vaping status: Never Used   Substance and Sexual Activity    Alcohol use: Not Currently    Drug use: Not Currently         REVIEW OF SYSTEMS:   Review of Systems   Constitutional:  Positive for fatigue. Negative for fever.   Respiratory:  Positive for shortness of breath. Negative for cough, chest tightness and wheezing.     Cardiovascular:  Negative for chest pain and palpitations.   Psychiatric/Behavioral:  Positive for sleep disturbance (wakes up a lot overnight to use restroom, some trouble falling back asleep). Negative for dysphoric mood. The patient is not nervous/anxious.        EXAM:   /76 (BP Location: Left arm, Patient Position: Sitting, Cuff Size: adult)   Pulse 86   Temp 97.1 °F (36.2 °C)   Resp 20   Wt 146 lb (66.2 kg)   SpO2 98%   BMI 26.70 kg/m²   Physical Exam  Constitutional:       General: She is not in acute distress.     Appearance: Normal appearance.   Cardiovascular:      Rate and Rhythm: Normal rate and regular rhythm.      Heart sounds: Normal heart sounds.   Pulmonary:      Effort: Pulmonary effort is normal.      Breath sounds: Normal breath sounds. No wheezing.   Neurological:      Mental Status: She is alert.         ASSESSMENT AND PLAN:   Diagnoses and all orders for this visit:    SOB (shortness of breath) on exertion  -     CARD ECHO 2D DOPPLER (CPT=93306); Future    Other fatigue  -     CARD ECHO 2D DOPPLER (CPT=93306); Future    Memory changes    Mixed hyperlipidemia    Recommend echo, order placed  Repeat lab work at end of month  Keep upcoming appointment with neurology

## 2024-08-28 ENCOUNTER — TELEPHONE (OUTPATIENT)
Dept: FAMILY MEDICINE CLINIC | Facility: CLINIC | Age: 64
End: 2024-08-28

## 2024-08-28 NOTE — TELEPHONE ENCOUNTER
Patient called back, states she is in [facility] with Covid and Pneumonia.  States she is doing much better.  Will call to schedule fasting labs once she is released.

## 2024-08-28 NOTE — TELEPHONE ENCOUNTER
Repeat fasting labs due  Left message to call office back       Lab Frequency Next Occurrence     07/11/2024   CK (Creatine Kinase) (Not Creatinine) Once 08/28/2024   Lipid Panel Once 08/28/2024   Hepatic Function Panel (7) [E] Once 08/28/2024

## 2024-09-05 ENCOUNTER — TELEPHONE (OUTPATIENT)
Dept: FAMILY MEDICINE CLINIC | Facility: CLINIC | Age: 64
End: 2024-09-05

## 2024-09-05 ENCOUNTER — HOSPITAL ENCOUNTER (OUTPATIENT)
Dept: GENERAL RADIOLOGY | Age: 64
Discharge: HOME | End: 2024-09-05
Attending: INTERNAL MEDICINE
Payer: MEDICARE

## 2024-09-05 DIAGNOSIS — N20.0 RENAL CALCULI: Primary | ICD-10-CM

## 2024-09-05 DIAGNOSIS — R19.4 CHANGE IN BOWEL HABITS: ICD-10-CM

## 2024-09-05 DIAGNOSIS — K59.00 CONSTIPATION, UNSPECIFIED CONSTIPATION TYPE: ICD-10-CM

## 2024-09-05 PROBLEM — J96.01 ACUTE RESPIRATORY FAILURE WITH HYPOXEMIA (HCC): Status: ACTIVE | Noted: 2024-08-26

## 2024-09-05 PROBLEM — J12.82 PNEUMONIA DUE TO COVID-19 VIRUS: Status: ACTIVE | Noted: 2024-08-26

## 2024-09-05 PROBLEM — J18.9 PNEUMONIA DUE TO SUSPECTED GRAM-NEGATIVE BACTERIA: Status: ACTIVE | Noted: 2024-08-25

## 2024-09-05 PROBLEM — A41.9 SEPSIS (HCC): Status: ACTIVE | Noted: 2020-05-14

## 2024-09-05 PROBLEM — U07.1 PNEUMONIA DUE TO COVID-19 VIRUS: Status: ACTIVE | Noted: 2024-08-26

## 2024-09-05 PROCEDURE — 74018 RADEX ABDOMEN 1 VIEW: CPT | Performed by: INTERNAL MEDICINE

## 2024-09-05 NOTE — TELEPHONE ENCOUNTER
Patient had abdominal xray with GI today  There were many kidney stones noted as well  I would recommend seeing urology. Referral placed

## 2024-09-11 ENCOUNTER — OFFICE VISIT (OUTPATIENT)
Facility: LOCATION | Age: 64
End: 2024-09-11
Payer: MEDICARE

## 2024-09-11 DIAGNOSIS — R82.90 URINE FINDING: ICD-10-CM

## 2024-09-11 DIAGNOSIS — N20.0 KIDNEY STONE: Primary | ICD-10-CM

## 2024-09-11 LAB
APPEARANCE: CLEAR
BILIRUBIN: NEGATIVE
GLUCOSE (URINE DIPSTICK): NEGATIVE MG/DL
KETONES (URINE DIPSTICK): NEGATIVE MG/DL
MULTISTIX LOT#: ABNORMAL NUMERIC
NITRITE, URINE: NEGATIVE
PH, URINE: 6 (ref 4.5–8)
SPECIFIC GRAVITY: 1.01 (ref 1–1.03)
URINE-COLOR: YELLOW
UROBILINOGEN,SEMI-QN: 0.2 MG/DL (ref 0–1.9)

## 2024-09-11 PROCEDURE — 87086 URINE CULTURE/COLONY COUNT: CPT

## 2024-09-11 PROCEDURE — 81003 URINALYSIS AUTO W/O SCOPE: CPT

## 2024-09-11 PROCEDURE — 99204 OFFICE O/P NEW MOD 45 MIN: CPT

## 2024-09-11 NOTE — PROGRESS NOTES
[facility], [address] [place] [address]    Urology Consult Note    History of Present Illness:   Patient is a(n) 64 year old female with hx of right breast cx s/p b/l mastectomy in 2008. COPD, HLD, intracranial tumor s/p  shunt who presents for kidney stones.    Pt recently had KUB done for constipation on 9/5/24. Right kidney: 1.3 x 1.7 cm right renal pelvis stone, numerous smaller stones in the mid lower pole of the right kidney. Left kidney: 13 x 7 mm mid lower pole, 9 x 12 mm lower pole, 6 x 4 mm midpole,  2 mm possible distal ureteral stone vs. Phlebolith.     Currently c/o very mild RLQ pain that began a wk ago. No urinary complaints. Had urosepsis in 2020 that required hospitalization but was never told she had stones. At the time did not have any urinary complaints. Otherwise, UTIs infrequently. I cannot see any prior CT A/Ps.     Drinks 64oz+ water daily, used to have high salt diet but has since cut down. Unsure if fhx of stones. Never passed stone before.      UA today moderate blood and small leuks     HISTORY:  Past Medical History:    Arthritis    Bloating    Blood in urine    Breast cancer, right breast (HCC)    Formatting of this note might be different from the original.  S/p b/l mastectomy  In 2008. On Aromacin      Change in hair    Constipation    COPD (chronic obstructive pulmonary disease) (HCC)    Diarrhea, unspecified    Fatigue    Frequent urination    High cholesterol    Intracranial tumor (HCC)    Irregular bowel habits    Pain in joints    Stool incontinence    Stress      Past Surgical History:   Procedure Laterality Date    Brain surgery  2007    removal brain tumor    Hysterectomy      Tonsillectomy      Wom w/ cydney mastec or hx of mastec        Family History   Problem Relation Age of Onset    Heart Disease Father     Substance Abuse Son     Diabetes Sister     Heart Disease Sister       Social History:   Social History     Socioeconomic History    Marital status:     Tobacco Use    Smoking status: Former     Current packs/day: 0.00     Types: Cigarettes     Quit date: 2018     Years since quittin.0    Smokeless tobacco: Never   Vaping Use    Vaping status: Never Used   Substance and Sexual Activity    Alcohol use: Not Currently    Drug use: Not Currently     Social Determinants of Health     Financial Resource Strain: Patient Declined (2024)    Received from [facility]    Overall Financial Resource Strain (CARDIA)     Difficulty of Paying Living Expenses: Patient declined   Food Insecurity: Patient Declined (2024)    Received from [facility]    Hunger Vital Sign     Worried About Running Out of Food in the Last Year: Patient declined     Ran Out of Food in the Last Year: Patient declined   Transportation Needs: Patient Declined (2024)    Received from [facility]    PRAPARE - Transportation     Lack of Transportation (Medical): Patient declined     Lack of Transportation (Non-Medical): Patient declined   Physical Activity: Patient Declined (2024)    Received from [facility]    Exercise Vital Sign     Days of Exercise per Week: Patient declined     Minutes of Exercise per Session: Patient declined   Stress: Patient Declined (2024)    Received from [facility]    [employer] - Occupational Stress Questionnaire     Feeling of Stress : Patient declined   Social Connections: Patient Declined (2024)    Received from [facility]    Social Connection and Isolation Panel [NHANES]     Frequency of Communication with Friends and Family: Patient declined     Frequency of Social Gatherings with Friends and Family: Patient declined     Attends Hoahaoism Services: Patient declined     Active Member of Clubs or  Organizations: Patient declined     Attends Club or Organization Meetings: Patient declined     Marital Status: Patient declined   Housing Stability: Patient Declined (8/25/2024)    Received from [facility]    Housing Stability Vital Sign     Unable to Pay for Housing in the Last Year: Patient declined     Number of Times Moved in the Last Year: 0     Homeless in the Last Year: Patient declined        Allergies  Allergies   Allergen Reactions    Amoxicillin NAUSEA ONLY    Tamoxifen OTHER (SEE COMMENTS)     Very emotional         Review of Systems:   A 10-point review of systems was completed and is negative other than as noted above.    Physical Exam:   There were no vitals taken for this visit.    GENERAL APPEARANCE: no acute distress  NEUROLOGIC: converses appropriately  HEAD: atraumatic, normocephalic  LUNGS: non-labored breathing  ABDOMEN: soft, nontender, non-distended  BACK: no CVA tenderness  PSYCH: appropriate affect and mood    Results:     Laboratory Data:  Lab Results   Component Value Date    WBC 5.2 07/16/2024    HGB 13.9 07/16/2024    .0 07/16/2024     Lab Results   Component Value Date     07/16/2024    K 3.8 07/16/2024     07/16/2024    CO2 29.0 07/16/2024    BUN 14 07/16/2024    GLU 94 07/16/2024    GFRAA 111 09/13/2021    AST 18 07/16/2024    ALT 23 07/16/2024    TP 7.4 07/16/2024    ALB 4.4 07/16/2024    CA 9.6 07/16/2024       Urinalysis Results (last 3 years):  Recent Labs     09/11/24  1415   SPECGRAVITY 1.015   PHURINE 6.0   NITRITE Negative       Urine Culture Results (last 3 years):  No results found for: \"URINECUL\"    Imaging  XR ABDOMEN (1 VIEW) (CPT=74018)    Result Date: 9/5/2024  PROCEDURE:  XR ABDOMEN (1 VIEW) (CPT=74018)  INDICATIONS:  R19.4 Change in bowel habits K59.00 Constipation, unspecified constipation type  COMPARISON:  None.  TECHNIQUE:  Supine AP view was obtained.  PATIENT STATED HISTORY: (As transcribed by Technologist)   Patient has trouble with constipation.    FINDINGS:  BOWEL GAS PATTERN:  The intestinal gas pattern is non obstructive.  There is gas and stool seen within the colon and rectum.  The stomach is mildly distended with gas.  No dilated small bowel loops are identified. CALCIFICATIONS:  Is there are numerous bilateral renal calculi.  There is a 1.3 x 1.7 cm calculus centered in the region of the right renal pelvis.  There are numerous smaller calculi seen in the mid lower pole of the right kidney.  There are multiple left renal calculi including a 6 x 4 mm calculus in the midpole a 13 x 7 mm calculus in the mid lower pole and a 9 x 12 mm calculus in the lower pole.  There is a 2 mm calcification in the left pelvis which is indeterminate and could reflect either a phlebolith or a distal ureteral calculus. OTHER:  Degenerative changes are seen in the spine.  There is a catheter identified projecting over the left flank and extending into the expected location of the spinal canal extending up to the lower thoracic spine at the level of T11.            CONCLUSION:  1. Nonobstructive bowel gas pattern.  Stool burden appears average. 2. Extensive bilateral renal calculi are noted.    LOCATION:  [facility]   Dictated by (CST): Chalo Luna MD on 9/05/2024 at 2:08 PM     Finalized by (CST): Chalo Luna MD on 9/05/2024 at 2:13 PM           Impression:   Recommendations:  Kidney stone  - given stone burden bilaterally and previous episode of urosepsis, advised cysto URS lithotripsy preventatively and she was agreeable.  - culture today given abnormal UA  - CT stones STAT to r/o current obstructing stone given KUB findings   - 24h urine in future    Thank you very much for this consult. Please call if there are any questions or concerns.     Mariah Gilmore PA-C  Urology  [facility]  Phone: [phone]    Date: 9/11/2024  Time: 2:32 PM

## 2024-09-13 ENCOUNTER — TELEPHONE (OUTPATIENT)
Dept: SURGERY | Facility: CLINIC | Age: 64
End: 2024-09-13

## 2024-09-13 ENCOUNTER — HOSPITAL ENCOUNTER (OUTPATIENT)
Dept: CT IMAGING | Age: 64
Discharge: HOME | End: 2024-09-13
Payer: MEDICARE

## 2024-09-13 DIAGNOSIS — N20.0 KIDNEY STONE: ICD-10-CM

## 2024-09-13 DIAGNOSIS — N20.1 URETERAL STONE: Primary | ICD-10-CM

## 2024-09-13 PROCEDURE — 74176 CT ABD & PELVIS W/O CONTRAST: CPT

## 2024-09-13 NOTE — TELEPHONE ENCOUNTER
Please schedule this patient for surgery.    Thank you!    - urine culture 1wk prior to surgery; order placed  - stent removal in office with me 1 wk after surgery. This needs to be scheduled    Urology Surgery Request  Surgeon: any [facility] but preferably tues or Wednesday surgery date  Location (if known): [facility]  Procedure: cysto, RIGHT RPG, URS, lithotripsy, stent placement   Anesthesia: General   Time Frame: soonest avail. Preferably less than 3wks  Time required: 60 minutes  Diagnosis: ureteral stone    Antibiotics: per hospital protocol unless checked below   ___ Levaquin 500 mg IV   ___ Gemcitabine 2 g/100 mL NS bladder instillation to be given in OR

## 2024-09-16 ENCOUNTER — ANESTHESIA EVENT (OUTPATIENT)
Dept: SURGERY | Facility: HOSPITAL | Age: 64
End: 2024-09-16
Payer: MEDICARE

## 2024-09-16 NOTE — TELEPHONE ENCOUNTER
Contacted the patient to schedule surgery (9:16am)  [facility] to call back to schedule.  Patient can contact [phone].

## 2024-09-16 NOTE — H&P
UROLOGY PRE-OPERATIVE HISTORY & PHYSICAL      Юлия Charles Patient Status:  Hospital Outpatient Surgery    1960 MRN [id number]   Location [facility] SURGERY Attending Joselo Dunn MD   Hosp Day # 0 PCP Patrizia Louise MD     Primary Care Provider: Patrizia Louise MD     Procedure      CYSTOSCOPY, RIGHT RETROGRADE PYELOGRAM, URETEROSCOPY, LASER LITHOTRIPSY, STENT PLACEMENT:     History of Present Illness:    Patient is a(n) 64 year old female with hx of right breast cx s/p b/l mastectomy in . COPD, HLD, intracranial tumor s/p  shunt who presents for kidney stones, here for R URS.      Pt recently had KUB done for constipation on 24. Right kidney: 1.3 x 1.7 cm right renal pelvis stone, numerous smaller stones in the mid lower pole of the right kidney. Left kidney: 13 x 7 mm mid lower pole, 9 x 12 mm lower pole, 6 x 4 mm midpole,  2 mm possible distal ureteral stone vs. Phlebolith.   She then saw our team for the above. Had very mild RLQ pain that began a wk ago. No urinary complaints. Had urosepsis in  that required hospitalization but was never told she had stones. At the time did not have any urinary complaints. Otherwise, UTIs infrequently.   Drinks 64oz+ water daily, used to have high salt diet but has since cut down. Unsure if fhx of stones. Never passed stone before.    A CT scan was obtained;   -Moderate right-sided hydronephrosis with a 1.6 x 1.1 cm calculus within the right renal pelvis.  No ureteral calculi are noted.  There are nonobstructing bilateral renal calculi present.    On my review; large stones bilaterally including in the left renal pelvic without hydronephrosis.   She was previously counseled on options and opted for URS.   Before patient was scheduled for surgery, I called patient on 2024.  I discussed that she has very large stones bilaterally.  Her right side stone burden appears to be obstructing her at the renal pelvis with a large 1.6 cm stone.  In the  lower pole she has more than 2 cm and of additional stone, it is unclear if this is intraparenchymal or not.  I also discussed that her left side has a stone that appears close to the renal pelvis, no obstruction at this time.  I reiterated treatment options which would include PCNL, ureteroscopy. I discussed both surgeries in detail.  I discussed that ureteroscopy may take several procedures to fully clear her out.  I did offer her staged procedures where we could complete the right sided ureteroscopy and stent the left side to allow for interval ureteroscopy.  For now, she is hesitant to have bilateral ureteral stents.  She is not symptomatic on the left side and wants to avoid intervention on this side for now.  She would like to proceed with right sided ureteroscopy as noted above.  She understands the risks of needing several procedures to clear out her stones.  She wants to avoid inpatient admission, more invasive procedures such as PCNL.    We will proceed with surgery as above.  The risks discussed included, but were not limited to, urinary tract infection, sepsis, bleeding, injury to urethra/bladder/ureter, urethra stricture, ureteral stricture, inability to treat stone. Patient agreeable to proceed and informed consent obtained.      Ucx: Negative      Stent size 6x24   5' 2\" (1.575 m)    History:     Past Medical History:    Arthritis    Bloating    Blood in urine    Breast cancer, right breast (HCC)    Formatting of this note might be different from the original.  S/p b/l mastectomy  In 2008. On Aromacin      Calculus of kidney    Change in hair    Constipation    COPD (chronic obstructive pulmonary disease) (HCC)    No oxygen    Diarrhea, unspecified    Fatigue    Frequent urination    High cholesterol    History of blood transfusion    about 20 years ago    Intracranial tumor (HCC)    Irregular bowel habits    Pain in joints    Personal history of antineoplastic chemotherapy    Last treatment 2014     Stool incontinence    Stress    Visual impairment    glasses       Past Surgical History:   Procedure Laterality Date    Brain surgery  2007    removal brain tumor    Hysterectomy      Removal of lung,lobectomy      Tonsillectomy      Wom w/ cydney mastec or hx of mastec         Family History   Problem Relation Age of Onset    Heart Disease Father     Substance Abuse Son     Diabetes Sister     Heart Disease Sister        Social History     Socioeconomic History    Marital status:    Tobacco Use    Smoking status: Former     Current packs/day: 0.00     Types: Cigarettes     Quit date: 2018     Years since quittin.0    Smokeless tobacco: Never   Vaping Use    Vaping status: Never Used   Substance and Sexual Activity    Alcohol use: Not Currently    Drug use: Not Currently     Social Determinants of Health     Financial Resource Strain: Patient Declined (2024)    Received from [facility] [employer] [facility] [employer] [facility]    Overall Financial Resource Strain (CARDIA)     Difficulty of Paying Living Expenses: Patient declined   Food Insecurity: Patient Declined (2024)    Received from [facility] [employer] [facility]    Hunger Vital Sign     Worried About Running Out of Food in the Last Year: Patient declined     Ran Out of Food in the Last Year: Patient declined   Transportation Needs: Patient Declined (2024)    Received from [facility] [employer] [facility]    PRAPARE - Transportation     Lack of Transportation (Medical): Patient declined     Lack of Transportation (Non-Medical): Patient declined   Physical Activity: Patient Declined (2024)    Received from [facility] [employer] [facility]    Exercise Vital Sign     Days of Exercise per Week: Patient declined     Minutes of Exercise per Session: Patient declined   Stress: Patient Declined (2024)    Received from [facility] [employer] [facility]  Occupational Health - Occupational Stress Questionnaire     Feeling of Stress : Patient declined   Social Connections: Patient Declined (8/25/2024)    Received from hospitals [employer] [facility]    Social Connection and Isolation Panel [NHANES]     Frequency of Communication with Friends and Family: Patient declined     Frequency of Social Gatherings with Friends and Family: Patient declined     Attends Orthodoxy Services: Patient declined     Active Member of Clubs or Organizations: Patient declined     Attends Club or Organization Meetings: Patient declined     Marital Status: Patient declined   Housing Stability: Patient Declined (8/25/2024)    Received from hospitals [employer] and [facility]    Housing Stability Vital Sign     Unable to Pay for Housing in the Last Year: Patient declined     Number of Times Moved in the Last Year: 0     Homeless in the Last Year: Patient declined       Medications:  Current Outpatient Medications   Medication Sig Dispense Refill    atorvastatin 10 MG Oral Tab Take 1 tablet (10 mg total) by mouth nightly. (Patient taking differently: Take 0.5 tablets (5 mg total) by mouth nightly.) 90 tablet 0    acetaminophen 325 MG Oral Tab Take 1 tablet (325 mg total) by mouth every 6 (six) hours as needed for Pain.      albuterol (PROAIR HFA) 108 (90 Base) MCG/ACT Inhalation Aero Soln Inhale 2 puffs into the lungs every 4 (four) hours as needed for Wheezing or Shortness of Breath. 1 each 1       Allergies:  Allergies   Allergen Reactions    Amoxicillin NAUSEA ONLY    Tamoxifen OTHER (SEE COMMENTS)     Very emotional         Review of Systems:   A comprehensive 10-point review of systems was completed.  Pertinent positives and negatives are noted in the the HPI.    Physical Exam:   Vital Signs:  Height 5' 2\" (1.575 m), weight 148 lb (67.1 kg).     CONSTITUTIONAL: Well developed, well nourished, in no acute distress   RESPIRATORY: Normal respiratory effort  ABDOMEN:  Soft, non-tender, non-distended      Laboratory Data:  Lab Results   Component Value Date    WBC 5.2 07/16/2024    HGB 13.9 07/16/2024    .0 07/16/2024     Lab Results   Component Value Date     07/16/2024    K 3.8 07/16/2024     07/16/2024    CO2 29.0 07/16/2024    BUN 14 07/16/2024    GLU 94 07/16/2024    GFRAA 111 09/13/2021    AST 18 07/16/2024    ALT 23 07/16/2024    TP 7.4 07/16/2024    ALB 4.4 07/16/2024    CA 9.6 07/16/2024       Urinalysis Results (last three years):  Recent Labs     09/11/24  1415   SPECGRAVITY 1.015   PHURINE 6.0   NITRITE Negative       Urine Culture Results (last three years):  Lab Results   Component Value Date    URINECUL No Growth 2 Days 09/11/2024       PSA:  No results found for: \"PSA\", \"PERCENTPSA\", \"PSAS\", \"PSAULTRA\"     Imaging (last three days):  No results found.     Assessment:   Юлия Charles is a 64 year old y/o female who presents for the above stated procedure.       Plan:     - OR for CYSTOSCOPY, RIGHT RETROGRADE PYELOGRAM, URETEROSCOPY, LASER LITHOTRIPSY, STENT PLACEMENT:   - NPO since midnight   - Antibiotics ordered for OR   - Informed consent obtained - risks and benefits explained, and all questions answered  - Marked appropriately     I have personally reviewed all relevant medical records, labs, and imaging.       Joselo Dunn MD  Staff Urologist  [facility]  Office: [phone]

## 2024-09-16 NOTE — TELEPHONE ENCOUNTER
Spoke with the patient.  Scheduling the surgery for  09/17/24.   Reviewed the pre-op instructions and will send via My Chart once confirmed.  Patient can contact me at [phone]

## 2024-09-17 ENCOUNTER — TELEPHONE (OUTPATIENT)
Dept: SURGERY | Facility: CLINIC | Age: 64
End: 2024-09-17

## 2024-09-17 ENCOUNTER — APPOINTMENT (OUTPATIENT)
Dept: GENERAL RADIOLOGY | Facility: HOSPITAL | Age: 64
End: 2024-09-17
Attending: UROLOGY
Payer: MEDICARE

## 2024-09-17 ENCOUNTER — ANESTHESIA (OUTPATIENT)
Dept: SURGERY | Facility: HOSPITAL | Age: 64
End: 2024-09-17
Payer: MEDICARE

## 2024-09-17 ENCOUNTER — HOSPITAL ENCOUNTER (OUTPATIENT)
Facility: HOSPITAL | Age: 64
Setting detail: HOSPITAL OUTPATIENT SURGERY
Discharge: HOME | End: 2024-09-17
Attending: UROLOGY | Admitting: UROLOGY
Payer: MEDICARE

## 2024-09-17 VITALS
BODY MASS INDEX: 26.68 KG/M2 | RESPIRATION RATE: 15 BRPM | OXYGEN SATURATION: 97 % | DIASTOLIC BLOOD PRESSURE: 85 MMHG | HEIGHT: 62 IN | WEIGHT: 145 LBS | TEMPERATURE: 97 F | SYSTOLIC BLOOD PRESSURE: 145 MMHG | HEART RATE: 95 BPM

## 2024-09-17 DIAGNOSIS — N20.0 KIDNEY STONE: Primary | ICD-10-CM

## 2024-09-17 DIAGNOSIS — N20.1 URETERAL STONE: ICD-10-CM

## 2024-09-17 DIAGNOSIS — N20.0 NEPHROLITHIASIS: ICD-10-CM

## 2024-09-17 PROCEDURE — 74420 UROGRAPHY RTRGR +-KUB: CPT | Performed by: UROLOGY

## 2024-09-17 PROCEDURE — 52356 CYSTO/URETERO W/LITHOTRIPSY: CPT | Performed by: UROLOGY

## 2024-09-17 PROCEDURE — 0TC08ZZ EXTIRPATION OF MATTER FROM RIGHT KIDNEY, VIA NATURAL OR ARTIFICIAL OPENING ENDOSCOPIC: ICD-10-PCS | Performed by: UROLOGY

## 2024-09-17 PROCEDURE — 0T768DZ DILATION OF RIGHT URETER WITH INTRALUMINAL DEVICE, VIA NATURAL OR ARTIFICIAL OPENING ENDOSCOPIC: ICD-10-PCS | Performed by: UROLOGY

## 2024-09-17 DEVICE — URETERAL STENT
Type: IMPLANTABLE DEVICE | Site: URETER | Status: FUNCTIONAL
Brand: ASCERTA™

## 2024-09-17 RX ORDER — ONDANSETRON 2 MG/ML
INJECTION INTRAMUSCULAR; INTRAVENOUS AS NEEDED
Status: DISCONTINUED | OUTPATIENT
Start: 2024-09-17 | End: 2024-09-17 | Stop reason: SURG

## 2024-09-17 RX ORDER — SODIUM CHLORIDE, SODIUM LACTATE, POTASSIUM CHLORIDE, CALCIUM CHLORIDE 600; 310; 30; 20 MG/100ML; MG/100ML; MG/100ML; MG/100ML
INJECTION, SOLUTION INTRAVENOUS CONTINUOUS
Status: DISCONTINUED | OUTPATIENT
Start: 2024-09-17 | End: 2024-09-17

## 2024-09-17 RX ORDER — KETOROLAC TROMETHAMINE 30 MG/ML
INJECTION, SOLUTION INTRAMUSCULAR; INTRAVENOUS AS NEEDED
Status: DISCONTINUED | OUTPATIENT
Start: 2024-09-17 | End: 2024-09-17 | Stop reason: SURG

## 2024-09-17 RX ORDER — OXYBUTYNIN CHLORIDE 5 MG/1
5 TABLET ORAL 3 TIMES DAILY PRN
Qty: 15 TABLET | Refills: 0 | Status: SHIPPED | OUTPATIENT
Start: 2024-09-17 | End: 2024-09-23

## 2024-09-17 RX ORDER — METOCLOPRAMIDE HYDROCHLORIDE 5 MG/ML
10 INJECTION INTRAMUSCULAR; INTRAVENOUS EVERY 8 HOURS PRN
Status: DISCONTINUED | OUTPATIENT
Start: 2024-09-17 | End: 2024-09-17

## 2024-09-17 RX ORDER — MIDAZOLAM HYDROCHLORIDE 1 MG/ML
INJECTION INTRAMUSCULAR; INTRAVENOUS AS NEEDED
Status: DISCONTINUED | OUTPATIENT
Start: 2024-09-17 | End: 2024-09-17 | Stop reason: SURG

## 2024-09-17 RX ORDER — MIDAZOLAM HYDROCHLORIDE 1 MG/ML
1 INJECTION INTRAMUSCULAR; INTRAVENOUS EVERY 5 MIN PRN
Status: DISCONTINUED | OUTPATIENT
Start: 2024-09-17 | End: 2024-09-17

## 2024-09-17 RX ORDER — HYDROMORPHONE HYDROCHLORIDE 1 MG/ML
0.6 INJECTION, SOLUTION INTRAMUSCULAR; INTRAVENOUS; SUBCUTANEOUS EVERY 5 MIN PRN
Status: DISCONTINUED | OUTPATIENT
Start: 2024-09-17 | End: 2024-09-17

## 2024-09-17 RX ORDER — HYDROMORPHONE HYDROCHLORIDE 1 MG/ML
0.2 INJECTION, SOLUTION INTRAMUSCULAR; INTRAVENOUS; SUBCUTANEOUS EVERY 5 MIN PRN
Status: DISCONTINUED | OUTPATIENT
Start: 2024-09-17 | End: 2024-09-17

## 2024-09-17 RX ORDER — ACETAMINOPHEN 500 MG
1000 TABLET ORAL ONCE AS NEEDED
Status: DISCONTINUED | OUTPATIENT
Start: 2024-09-17 | End: 2024-09-17

## 2024-09-17 RX ORDER — HYDROCODONE BITARTRATE AND ACETAMINOPHEN 5; 325 MG/1; MG/1
1 TABLET ORAL ONCE AS NEEDED
Status: DISCONTINUED | OUTPATIENT
Start: 2024-09-17 | End: 2024-09-17

## 2024-09-17 RX ORDER — DIPHENHYDRAMINE HYDROCHLORIDE 50 MG/ML
12.5 INJECTION INTRAMUSCULAR; INTRAVENOUS AS NEEDED
Status: DISCONTINUED | OUTPATIENT
Start: 2024-09-17 | End: 2024-09-17

## 2024-09-17 RX ORDER — HYDROCODONE BITARTRATE AND ACETAMINOPHEN 5; 325 MG/1; MG/1
2 TABLET ORAL ONCE AS NEEDED
Status: DISCONTINUED | OUTPATIENT
Start: 2024-09-17 | End: 2024-09-17

## 2024-09-17 RX ORDER — SULFAMETHOXAZOLE/TRIMETHOPRIM 800-160 MG
1 TABLET ORAL 2 TIMES DAILY
Qty: 6 TABLET | Refills: 0 | Status: SHIPPED | OUTPATIENT
Start: 2024-09-17 | End: 2024-09-20

## 2024-09-17 RX ORDER — POLYETHYLENE GLYCOL 3350 17 G/17G
17 POWDER, FOR SOLUTION ORAL DAILY PRN
Qty: 20 PACKET | Refills: 1 | Status: SHIPPED | OUTPATIENT
Start: 2024-09-17 | End: 2024-09-23 | Stop reason: ALTCHOICE

## 2024-09-17 RX ORDER — HYDROMORPHONE HYDROCHLORIDE 1 MG/ML
0.4 INJECTION, SOLUTION INTRAMUSCULAR; INTRAVENOUS; SUBCUTANEOUS EVERY 5 MIN PRN
Status: DISCONTINUED | OUTPATIENT
Start: 2024-09-17 | End: 2024-09-17

## 2024-09-17 RX ORDER — PHENAZOPYRIDINE HYDROCHLORIDE 100 MG/1
100 TABLET, FILM COATED ORAL 3 TIMES DAILY PRN
Qty: 10 TABLET | Refills: 0 | Status: SHIPPED | OUTPATIENT
Start: 2024-09-17 | End: 2024-09-23 | Stop reason: ALTCHOICE

## 2024-09-17 RX ORDER — ONDANSETRON 2 MG/ML
4 INJECTION INTRAMUSCULAR; INTRAVENOUS EVERY 6 HOURS PRN
Status: DISCONTINUED | OUTPATIENT
Start: 2024-09-17 | End: 2024-09-17

## 2024-09-17 RX ORDER — TAMSULOSIN HYDROCHLORIDE 0.4 MG/1
0.4 CAPSULE ORAL EVERY EVENING
Qty: 14 CAPSULE | Refills: 0 | Status: SHIPPED | OUTPATIENT
Start: 2024-09-17 | End: 2024-10-01

## 2024-09-17 RX ORDER — MEPERIDINE HYDROCHLORIDE 25 MG/ML
12.5 INJECTION INTRAMUSCULAR; INTRAVENOUS; SUBCUTANEOUS AS NEEDED
Status: DISCONTINUED | OUTPATIENT
Start: 2024-09-17 | End: 2024-09-17

## 2024-09-17 RX ORDER — NALOXONE HYDROCHLORIDE 0.4 MG/ML
0.08 INJECTION, SOLUTION INTRAMUSCULAR; INTRAVENOUS; SUBCUTANEOUS AS NEEDED
Status: DISCONTINUED | OUTPATIENT
Start: 2024-09-17 | End: 2024-09-17

## 2024-09-17 RX ORDER — DEXAMETHASONE SODIUM PHOSPHATE 4 MG/ML
VIAL (ML) INJECTION AS NEEDED
Status: DISCONTINUED | OUTPATIENT
Start: 2024-09-17 | End: 2024-09-17 | Stop reason: SURG

## 2024-09-17 RX ORDER — ACETAMINOPHEN 500 MG
1000 TABLET ORAL ONCE
Status: DISCONTINUED | OUTPATIENT
Start: 2024-09-17 | End: 2024-09-17 | Stop reason: HOSPADM

## 2024-09-17 RX ORDER — SCOLOPAMINE TRANSDERMAL SYSTEM 1 MG/1
1 PATCH, EXTENDED RELEASE TRANSDERMAL ONCE
Status: DISCONTINUED | OUTPATIENT
Start: 2024-09-17 | End: 2024-09-17 | Stop reason: HOSPADM

## 2024-09-17 RX ADMIN — KETOROLAC TROMETHAMINE 30 MG: 30 INJECTION, SOLUTION INTRAMUSCULAR; INTRAVENOUS at 08:39:00

## 2024-09-17 RX ADMIN — ONDANSETRON 4 MG: 2 INJECTION INTRAMUSCULAR; INTRAVENOUS at 07:27:00

## 2024-09-17 RX ADMIN — SODIUM CHLORIDE, SODIUM LACTATE, POTASSIUM CHLORIDE, CALCIUM CHLORIDE: 600; 310; 30; 20 INJECTION, SOLUTION INTRAVENOUS at 07:33:00

## 2024-09-17 RX ADMIN — SODIUM CHLORIDE, SODIUM LACTATE, POTASSIUM CHLORIDE, CALCIUM CHLORIDE: 600; 310; 30; 20 INJECTION, SOLUTION INTRAVENOUS at 07:04:00

## 2024-09-17 RX ADMIN — SODIUM CHLORIDE, SODIUM LACTATE, POTASSIUM CHLORIDE, CALCIUM CHLORIDE: 600; 310; 30; 20 INJECTION, SOLUTION INTRAVENOUS at 08:43:00

## 2024-09-17 RX ADMIN — MIDAZOLAM HYDROCHLORIDE 2 MG: 1 INJECTION INTRAMUSCULAR; INTRAVENOUS at 07:06:00

## 2024-09-17 RX ADMIN — DEXAMETHASONE SODIUM PHOSPHATE 8 MG: 4 MG/ML VIAL (ML) INJECTION at 07:27:00

## 2024-09-17 NOTE — ANESTHESIA PROCEDURE NOTES
Airway  Date/Time: 9/17/2024 7:14 AM  Urgency: elective    Airway not difficult    General Information and Staff    Patient location during procedure: OR  Anesthesiologist: Zhang Salguero MD  Performed: anesthesiologist   Performed by: Zhang Salguero MD  Authorized by: Zhang Salguero MD      Indications and Patient Condition  Indications for airway management: anesthesia  Spontaneous ventilation: present  Sedation level: moderate (conscious sedation)  Preoxygenated: yes  Patient position: sniffing  Mask difficulty assessment: 1 - vent by mask    Final Airway Details  Final airway type: supraglottic airway      Successful airway: classic  Size 3       Number of attempts at approach: 1  Number of other approaches attempted: 0

## 2024-09-17 NOTE — OPERATIVE REPORT
Urology Operative Note    Attending Surgeon: Joselo Dunn MD    Assistant Surgeon: None    Patient Name: Юлия Charles    Date of Surgery: 9/17/2024    Preoperative Diagnosis: RIGHT nephrolithiasis    Postoperative Diagnosis: Same    Procedure Performed:   Cystoscopy, RIGHT ureteroscopy, laser lithotripsy, basket stone extraction, retrograde pyelogram, ureteral stent placement    Indication:     Patient is a(n) 64 year old female with hx of right breast cx s/p b/l mastectomy in 2008. COPD, HLD, intracranial tumor s/p  shunt who presents for kidney stones, here for R URS.      Pt recently had KUB done for constipation on 9/5/24. Right kidney: 1.3 x 1.7 cm right renal pelvis stone, numerous smaller stones in the mid lower pole of the right kidney. Left kidney: 13 x 7 mm mid lower pole, 9 x 12 mm lower pole, 6 x 4 mm midpole,  2 mm possible distal ureteral stone vs. Phlebolith.   She then saw our team for the above. Had very mild RLQ pain that began a wk ago. No urinary complaints. Had urosepsis in 2020 that required hospitalization but was never told she had stones. At the time did not have any urinary complaints. Otherwise, UTIs infrequently.   Drinks 64oz+ water daily, used to have high salt diet but has since cut down. Unsure if fhx of stones. Never passed stone before.    A CT scan was obtained;   -Moderate right-sided hydronephrosis with a 1.6 x 1.1 cm calculus within the right renal pelvis.  No ureteral calculi are noted.  There are nonobstructing bilateral renal calculi present.     On my review; large stones bilaterally including in the left renal pelvic without hydronephrosis.   She was previously counseled on options and opted for URS.   Before patient was scheduled for surgery, I called patient on 9-.  I discussed that she has very large stones bilaterally.  Her right side stone burden appears to be obstructing her at the renal pelvis with a large 1.6 cm stone.  In the lower pole she has more  than 2 cm and of additional stone, it is unclear if this is intraparenchymal or not.  I also discussed that her left side has a stone that appears close to the renal pelvis, no obstruction at this time.  I reiterated treatment options which would include PCNL, ureteroscopy. I discussed both surgeries in detail.  I discussed that ureteroscopy may take several procedures to fully clear her out.  I did offer her staged procedures where we could complete the right sided ureteroscopy and stent the left side to allow for interval ureteroscopy.  For now, she is hesitant to have bilateral ureteral stents.  She is not symptomatic on the left side and wants to avoid intervention on this side for now.  She would like to proceed with right sided ureteroscopy as noted above.  She understands the risks of needing several procedures to clear out her stones.  She wants to avoid inpatient admission, more invasive procedures such as PCNL.     We will proceed with surgery as above.  The risks discussed included, but were not limited to, urinary tract infection, sepsis, bleeding, injury to urethra/bladder/ureter, urethra stricture, ureteral stricture, inability to treat stone. Patient agreeable to proceed and informed consent obtained.       Findings:  Normal urethra without strictures. No bladder lesions. Right RPG with large radiopaque stones bilaterally, URS with large ~1.5 cm stone slightly embedded at renal pelvis; lower pole with additional ~1cm stone and large cluster of 2-3mm stones. All stone fragmented/dusted and basket extracted; given extensive stone and poor visualization at conclusion plan for second look to clear out all stone; suspect majority of stone treated.   6x24 JJ stent placed without issue.        Procedure:  The patient was taken to the operating room and a timeout was performed confirming the correct patient and procedure. The patient was prepped and draped in lithotomy position after undergoing general  anesthesia. Pre-operative prophylactic antibiotics were given in the form of Ancef.    The cystoscope was inserted per urethra and the bladder was inspected and drained. The RIGHT ureter was cannulated with a Tigertail catheter and advanced to the mid- ureter. A RIGHT retrograde pyelogram was obtained which demonstrated large radiopaque stones bilaterally. A Sensor wire was then passed through the catheter and into the renal pelvis which I confirmed using fluoroscopy. The catheter was removed.      Then, an 8x10fr dilator was used to dilate the distal ureter under fluoroscopy over the Sensor wire.  I then placed a second Sensor wire through the dilator and into the renal pelvis under fluoroscopy.   Once both wires were confirmed to be in the kidney I removed the dilator. One wire was then secured to the drapes as a safety wire while the other was our working wire.      Over our working wire we gently introduced a 11/13F x 28cm ureteral access sheath under fluoroscopic visualization to the level of the proximal ureter. This passed easily. Once that was done, we then removed the inner sheath and wire.  The flexible ureteroscope was advanced into the sheath and up into the right renal pelvis.  A systematic inspection of the kidney revealed: Large ~1.5 cm stone slightly embedded at renal pelvis; lower pole with additional ~1cm stone and large cluster of 2-3mm stones. The stones were fragmented and dusted using a laser, and then the fragments were extracted using a zero tip basket. Extensive stone was removed; given extensive stone fragments and dust making visualization poor decision made to place stent and plan for second look ureteroscopy to ensure clearance of all stone. Suspect majority of stone treated.  A retrograde pyelogram was performed through the scope and showed no extravasation. The ureter was inspected while slowly removing the scope and access sheath, and it appeared healthy without stone fragments  seen.    We then placed a 6x24 JJ  double-J ureteral stent over our safety wire under direct cystoscopic and fluoroscopic guidance. The proximal and distal curls formed appropriately. Stent left without strings. The cystoscope was removed. A guan catheter was placed to maximal decompression.    The procedure was then terminated.    The patient was awoken from anesthesia and transferred to PACU in stable condition. The patient tolerated the procedure well. All instrument/supply counts were correct at the end of the case.    Specimens:   Stone fragments for chemical analysis    Estimated Blood Loss:  2 mL    Tubes/Drains:  6-Kazakh x 24 cm JJ RIGHT ureteral stent    Complications:   None immediate    Condition from OR:  Stable    Plan:   Abx x3 days   Void trial 1 hour   Return for second look URS in a few weeks ; again, patient does not want left side treated until right side is complete (wants to avoid bilateral stents)      Joselo Dunn MD  Staff Urologist  [facility]  Office: [phone]

## 2024-09-17 NOTE — DISCHARGE INSTRUCTIONS
You had cystoscopy, ureteroscopy, and stent placement in the operating room today.    Instructions:    - No heavy lifting or strenuous activity for 1 day. You may resume regular activity tomorrow.    - The office will contact you to make your next surgery appoitnment in a few days.    If you do not hear from the clinic after a few days or you need to change your appointment time or date, please contact us at [phone].     - You have a stent (small plastic tube) inside your kidney and ureter to allow the swelling from surgery to resolve. This is only temporary and must be removed, so you should not forget about it. We will remove your stent via a brief cystoscopy in clinic when you return. If you have to miss this appointment for some reason please make sure you re-schedule it.     - Take the antibiotics for 3 days     - With a ureteral stent in place you may have some discomfort on your side/flank. You can feel pain worsen when you urinate, so try to avoid holding urine and void every 2-3 hours. You also may notice increased blood in the urine as you increase your activity. If this happens increase your hydration and take it easy for a day. Warm baths/hot packs can help with the discomfort as well as your prescribed medications and Advil/Motrin (if you are able to take these).     - You may experience mild pain after the procedure for a few days.  If the pain becomes intolerable please contact our office or go to the nearest Emergency Room or Urgent Care. You should take over the counter ibuprofen (AKA motrin, advil) for mild pain (provided you do not have a medical condition such as stomach ulcers or kidney disease which prohibits you from taking these). You may alternate this with tylenol as well. If pain is still not relieved by tylenol and/or ibuprofen, you may take narcotic pain medication if prescribed (typically oxycodone or tramadol). If you are taking narcotic pain medication this can make you  constipated, so you should take over the counter stool softeners or miralax if prescribed.  Your next dose of ibuprofen can be taken at 2:45pm.    - Warm pack or hot baths often help with discomfort after cystoscopy.    - If you take blood thinners (such as aspirin or plavix) please hold these medications until 3 days after surgery.     - You may experience burning and frequency of urination over the next few days. This will improve after a few days if you stay well hydrated. If you were prescribed phenazopyridine (Pyridium) this may relieve urinary discomfort but you can only take this for 3 days. Pyridium will make your urine orange.     - You are likely to see some blood in your urine (pink or light red urine) that should clear up within a few days. Staying well hydrated should help this clear up. If you notice the urine stays dark red or there are multiple large blood clots despite good hydration, please call the urology clinic ([phone]).     - Try to abstain from alcohol, coffee, tea, artificial sweeteners, and spicy food for the next 48 hours as these can irritate the bladder.     - If you develop fevers / chills, difficulty urinating, or abdominal pain that does not improve with pain medications, please call the office.     - Drink 1.5 to 2 liters of fluid today (water is preferable). If you are on a fluid restriction due to other medical reasons then you need to adhere to your fluid restriction recommendations.      Joselo Dunn MD  Staff Urologist  [facility]  Office: [phone]

## 2024-09-17 NOTE — ANESTHESIA PREPROCEDURE EVALUATION
PRE-OP EVALUATION    Patient Name: Юлия Charles    Admit Diagnosis: Ureteral stone [N20.1]    Pre-op Diagnosis: Ureteral stone [N20.1]    CYSTOSCOPY, RIGHT RETROGRADE PYELOGRAM, URETEROSCOPY, LASER LITHOTRIPSY, STENT PLACEMENT    Anesthesia Procedure: CYSTOSCOPY, RIGHT RETROGRADE PYELOGRAM, URETEROSCOPY, LASER LITHOTRIPSY, STENT PLACEMENT (Right: Ureter)    Surgeons and Role:     * Joselo Dunn MD - Primary    Pre-op vitals reviewed.  Temp: 97.9 °F (36.6 °C)  Pulse: 70  Resp: 16  BP: 145/81  SpO2: 98 %  Body mass index is 26.52 kg/m².    Current medications reviewed.  Hospital Medications:  • [Transfer Hold] acetaminophen (Tylenol Extra Strength) tab 1,000 mg  1,000 mg Oral Once   • [Transfer Hold] scopolamine (Transderm-Scop) 1 MG/3DAYS patch 1 patch  1 patch Transdermal Once   • lactated ringers infusion   Intravenous Continuous   • [COMPLETED] ceFAZolin (Ancef) 2g in 10mL IV syringe premix  2 g Intravenous Once       Outpatient Medications:     Medications Prior to Admission   Medication Sig Dispense Refill Last Dose   • atorvastatin 10 MG Oral Tab Take 1 tablet (10 mg total) by mouth nightly. (Patient taking differently: Take 0.5 tablets (5 mg total) by mouth nightly.) 90 tablet 0 9/16/2024 at 1900   • acetaminophen 325 MG Oral Tab Take 1 tablet (325 mg total) by mouth every 6 (six) hours as needed for Pain.      • albuterol (PROAIR HFA) 108 (90 Base) MCG/ACT Inhalation Aero Soln Inhale 2 puffs into the lungs every 4 (four) hours as needed for Wheezing or Shortness of Breath. 1 each 1 9/16/2024 at 1400       Allergies: Amoxicillin and Tamoxifen      Anesthesia Evaluation    Patient summary reviewed.    Anesthetic Complications  (-) history of anesthetic complications         GI/Hepatic/Renal    Negative GI/hepatic/renal ROS.                             Cardiovascular      ECG reviewed.                                                 Endo/Other                                  Pulmonary      (+)  asthma  (+) COPD     (+) pneumonia (covid about 1 month ago +bacterial pnemonia at [facility] per patient, no ICU)              Neuro/Psych                              Hx breast CA - no limb restrictions    Past Surgical History:   Procedure Laterality Date   • Brain surgery      removal brain tumor   • Hysterectomy     • Removal of lung,lobectomy     • Tonsillectomy     • Wom w/ cydney mastec or hx of mastec       Social History     Socioeconomic History   • Marital status:    Tobacco Use   • Smoking status: Former     Current packs/day: 0.00     Types: Cigarettes     Quit date: 2018     Years since quittin.0   • Smokeless tobacco: Never   Vaping Use   • Vaping status: Never Used   Substance and Sexual Activity   • Alcohol use: Not Currently   • Drug use: Not Currently     History   Drug Use Unknown     Available pre-op labs reviewed.  Lab Results   Component Value Date    WBC 5.2 2024    RBC 4.54 2024    HGB 13.9 2024    HCT 42.6 2024    MCV 93.8 2024    MCH 30.6 2024    MCHC 32.6 2024    RDW 13.0 2024    .0 2024     Lab Results   Component Value Date     2024    K 3.8 2024     2024    CO2 29.0 2024    BUN 14 2024    CREATSERUM 0.69 2024    GLU 94 2024    CA 9.6 2024            Airway      Mallampati: III  Mouth opening: 3 FB  TM distance: 4 - 6 cm  Neck ROM: full Cardiovascular    Cardiovascular exam normal.         Dental    Dentition appears grossly intact         Pulmonary    Pulmonary exam normal.                 Other findings        ASA: 2   Plan: general  NPO status verified and patient meets guidelines.  Patient has not taken beta blockers in last 24 hours.      Comment: GA with LMA/ETT. Risks discussed including sore throat, hoarse voice, dental trauma, nausea /vomiting  Plan/risks discussed with: patient and spouse            Present on  Admission:  **None**

## 2024-09-17 NOTE — ANESTHESIA POSTPROCEDURE EVALUATION
[facility]    Юлия Charles Patient Status:  Hospital Outpatient Surgery   Age/Gender 64 year old female MRN [id number]   Location [facility] SURGERY Attending Joselo Dunn MD   Hosp Day # 0 PCP Patrizia Louise MD       Anesthesia Post-op Note    CYSTOSCOPY, RIGHT RETROGRADE PYELOGRAM, URETEROSCOPY, LASER LITHOTRIPSY, STONE EXTRACTION,  STENT PLACEMENT    Procedure Summary       Date: 09/17/24 Room / Location:  MAIN OR 07 /  MAIN OR    Anesthesia Start: 0704 Anesthesia Stop:     Procedure: CYSTOSCOPY, RIGHT RETROGRADE PYELOGRAM, URETEROSCOPY, LASER LITHOTRIPSY, STONE EXTRACTION,  STENT PLACEMENT (Right: Ureter) Diagnosis:       Ureteral stone      (Ureteral stone [N20.1])    Surgeons: Joselo Dunn MD Anesthesiologist: Zhang Salguero MD    Anesthesia Type: general ASA Status: 2            Anesthesia Type: general    Vitals Value Taken Time   /57 09/17/24 0909   Temp 97.1 09/17/24 0909   Pulse 87 09/17/24 0908   Resp 14 09/17/24 0908   SpO2 96 09/17/24 0909   Vitals shown include unfiled device data.    Patient Location: PACU    Anesthesia Type: general    Airway Patency: patent and extubated    Postop Pain Control: adequate    Mental Status: mildly sedated but able to meaningfully participate in the post-anesthesia evaluation    Nausea/Vomiting: none    Cardiopulmonary/Hydration status: stable euvolemic    Complications: no apparent anesthesia related complications    Postop vital signs: stable    Dental Exam: Unchanged from Preop    Patient to be discharged from PACU when criteria met.

## 2024-09-17 NOTE — TELEPHONE ENCOUNTER
Hi,    Can you please schedule this patient for surgery.  Ok to overbook before or after a clinic if I am not on call.     Also, can you arrange for the patient to drop a urine sample for urine culture 1-2 weeks prior to the scheduled surgery date? I placed the order.       Thanks,  Joselo       Urology Surgery Request  Surgeon: Joselo Dunn  Location (if known): [facility]  Procedure: Cystoscopy, RIGHT ureteroscopy, laser lithotripsy, stent exchange   Anesthesia: General   Time Frame: Next available- after ~14 days   Time required: 60 minutes  Diagnosis: Nephrolithiasis  Special Equipment: C-arm    Antibiotics: per hospital protocol unless checked below   ___ Levaquin 500 mg IV   ___ Gemcitabine 2 g/100 mL NS bladder instillation to be given in OR    Estimated Post Op/Follow Up Appt:   1 week for cystoscopy/stent removal in clinic with me. Please schedule appointment at time of surgery scheduling.

## 2024-09-18 NOTE — TELEPHONE ENCOUNTER
Spoke with the patient.  Scheduling the surgery for 10/3/24.  Reviewed the pre-op instructions and will send via My Chart or mail to patient once confirmed.  Patient can contact me at [phone]

## 2024-09-18 NOTE — ADDENDUM NOTE
Addended by: MAGED BARRIENTOS on: 9/18/2024 04:41 PM     Modules accepted: Orders     If you are a smoker, it is important for your health to stop smoking. Please be aware that second hand smoke is also harmful.

## 2024-09-23 ENCOUNTER — LAB ENCOUNTER (OUTPATIENT)
Dept: LAB | Age: 64
End: 2024-09-23
Attending: Other
Payer: MEDICARE

## 2024-09-23 ENCOUNTER — OFFICE VISIT (OUTPATIENT)
Facility: CLINIC | Age: 64
End: 2024-09-23
Payer: MEDICARE

## 2024-09-23 VITALS
BODY MASS INDEX: 26 KG/M2 | HEART RATE: 86 BPM | DIASTOLIC BLOOD PRESSURE: 74 MMHG | SYSTOLIC BLOOD PRESSURE: 120 MMHG | WEIGHT: 144.81 LBS | RESPIRATION RATE: 16 BRPM

## 2024-09-23 DIAGNOSIS — N20.0 NEPHROLITHIASIS: ICD-10-CM

## 2024-09-23 DIAGNOSIS — Z01.818 PRE-OP TESTING: ICD-10-CM

## 2024-09-23 DIAGNOSIS — R41.3 MEMORY LOSS: ICD-10-CM

## 2024-09-23 DIAGNOSIS — R41.3 MEMORY LOSS: Primary | ICD-10-CM

## 2024-09-23 PROCEDURE — 87186 SC STD MICRODIL/AGAR DIL: CPT

## 2024-09-23 PROCEDURE — 82746 ASSAY OF FOLIC ACID SERUM: CPT

## 2024-09-23 PROCEDURE — 87086 URINE CULTURE/COLONY COUNT: CPT

## 2024-09-23 PROCEDURE — 87077 CULTURE AEROBIC IDENTIFY: CPT

## 2024-09-23 PROCEDURE — 36415 COLL VENOUS BLD VENIPUNCTURE: CPT

## 2024-09-23 NOTE — PATIENT INSTRUCTIONS
Refill policies:    Allow 2-3 business days for refills; controlled substances may take longer.  Contact your pharmacy at least 5 days prior to running out of medication and have them send an electronic request or submit request through the “request refill” option in your [employer] account.  Refills are not addressed on weekends; covering physicians do not authorize routine medications on weekends.  No narcotics or controlled substances are refilled after noon on Fridays or by on call physicians.  By law, narcotics must be electronically prescribed.  A 30 day supply with no refills is the maximum allowed.  If your prescription is due for a refill, you may be due for a follow up appointment.  To best provide you care, patients receiving routine medications need to be seen at least once a year.  Patients receiving narcotic/controlled substance medications need to be seen at least once every 3 months.  In the event that your preferred pharmacy does not have the requested medication in stock (e.g. Backordered), it is your responsibility to find another pharmacy that has the requested medication available.  We will gladly send a new prescription to that pharmacy at your request.    Scheduling Tests:    If your physician has ordered radiology tests such as MRI or CT scans, please contact Central Scheduling at [phone] right away to schedule the test.  Once scheduled, the [facility] Centralized Referral Team will work with your insurance carrier to obtain pre-certification or prior authorization.  Depending on your insurance carrier, approval may take 3-10 days.  It is highly recommended patients assure they have received an authorization before having a test performed.  If test is done without insurance authorization, patient may be responsible for the entire amount billed.      Precertification and Prior Authorizations:  If your physician has recommended that you have a procedure or additional testing performed the [facility]  Centralized Referral Team will contact your insurance carrier to obtain pre-certification or prior authorization.    You are strongly encouraged to contact your insurance carrier to verify that your procedure/test has been approved and is a COVERED benefit.  Although the [facility] Centralized Referral Team does its due diligence, the insurance carrier gives the disclaimer that \"Although the procedure is authorized, this does not guarantee payment.\"    Ultimately the patient is responsible for payment.   Thank you for your understanding in this matter.  Paperwork Completion:  If you require FMLA or disability paperwork for your recovery, please make sure to either drop it off or have it faxed to our office at [phone]. Be sure the form has your name and date of birth on it.  The form will be faxed to our Forms Department and they will complete it for you.  There is a 25$ fee for all forms that need to be filled out.  Please be aware there is a 10-14 day turnaround time.  You will need to sign a release of information (ANDREY) form if your paperwork does not come with one.  You may call the Forms Department with any questions at [phone].  Their fax number is [phone].

## 2024-09-23 NOTE — PROGRESS NOTES
Neurology History & Physical     ASSESSMENT & PLAN:      ICD-10-CM    1. Memory loss  R41.3 Folic Acid Serum (Folate)     MRI BRAIN (W+WO) (CPT=70553)     PSYCHOLOGY - INTERNAL        Cognitive dysfunction / Memory loss, uncontrolled. I will be providing ongoing care for this serious problem.  Does have breast cancer history.  Exam consistent with  Mild cognitive impairment (MoCA 17-25).  I recommended Folate, MRI brain, and neuropsych testing.  We discussed natural history / progression / prognosis, and importance of preventative measures (e.g., avoiding DM and hyperglycemia, staying physically active, and staying socially and mentally engaged).    We discussed medication side effects and activity precautions. We discussed symptoms that would warrant urgent/emergent evaluation. Patient verbalized understanding and agreement.    Return in about 4 months (around 1/23/2025).       ~~~~~~~~~~~~~~~~~~~~~~~~~~~    CHIEF COMPLAINT / REASON FOR VISIT:    Chief Complaint   Patient presents with    Neurologic Problem    Memory Loss     Decrease in short term memory, patient states changes started about a year ago. Denies changes in speech.      HISTORY OBTAINED FROM:  Patient and   Chart review    HISTORY:  Юлия Charles is a 64 year old female with 1.5-2 years of slowly progressive memory problems.  She repeats questions, forgets recent discussions.  LTM is relatively preserved.  (Had cerebellar tumor resected 10 years ago, not malignant.)  Lives with , daughter, granddaughter.  She has not forgotten bills or groceries.  No issues driving, not accidents.  Retired  (at age 14).    Does not snore.  Mood is good.  Does have mild tremor.    No limb numbness or weakness.  No vision or hearing changes.  No dizziness, no balance or gait issues.    No new headaches outside of her chronic headaches.      Driving status:  Driving    DATA REVIEWED:  As documented in the history    Aug 2024  FM APRN note    Head CT unremarkable (other than post op changes of suboccipital crani with right cerebellar encephalomalacia)  CBC WBC 14.59  CMP unremarkable     July 2024  TSH, FT4, B12 unremarkable     PHYSICAL EXAMINATION:  /74   Pulse 86   Resp 16   Wt 144 lb 12.8 oz (65.7 kg)   BMI 26.48 kg/m²     Gen: in NAD    MoCA:  Visuospatial/Executive ( of 5): 5  Naming ( of 3): 3  Attention ( of 6): 6  Naming ( of 3): 3  Abstraction ( of 2): 2  Delayed Recall ( of 5): 0  Orientation ( of 6): 6  Extra point for <= 12 Yr Education ( of 1): 0  TOTAL:  Total ( of 30): 24    CN: PERRL, VFF; EOMI, no nystagmus; nl facial mvmt bilaterally; nl hearing bilaterally; nl palate elevation bilaterally; nl voice; nl shoulder shrug b/I; nl tongue movement  Motor: 5/5 x4; no drift; normal tone; no abnormal movements  Sensory: nl vibration x4  Coord: nl FTN b/I  Reflex: 2+ BUE and BLE  Gait: normal    Allergies   Allergen Reactions    Amoxicillin NAUSEA ONLY    Tamoxifen OTHER (SEE COMMENTS)     Very emotional         Current medications:   Wheat Dextrin (BENEFIBER OR) Take by mouth.      tamsulosin 0.4 MG Oral Cap Take 1 capsule (0.4 mg total) by mouth every evening for 14 days. 14 capsule 0    atorvastatin 10 MG Oral Tab Take 1 tablet (10 mg total) by mouth nightly. 90 tablet 0    acetaminophen 325 MG Oral Tab Take 1 tablet (325 mg total) by mouth every 6 (six) hours as needed for Pain.      albuterol (PROAIR HFA) 108 (90 Base) MCG/ACT Inhalation Aero Soln Inhale 2 puffs into the lungs every 4 (four) hours as needed for Wheezing or Shortness of Breath. 1 each 1       Past Medical History:    Arthritis    Bloating    Blood in urine    Breast cancer, right breast ([facility])    Formatting of this note might be different from the original.  S/p b/l mastectomy  In 2008. On Aromacin      Calculus of kidney    Change in hair    Constipation    COPD (chronic obstructive pulmonary disease) ([facility])    No oxygen    Diarrhea, unspecified    Fatigue     Frequent urination    High cholesterol    History of blood transfusion    about 20 years ago    Intracranial tumor (HCC)    Irregular bowel habits    Pain in joints    Personal history of antineoplastic chemotherapy    Last treatment     Stool incontinence    Stress    Visual impairment    glasses       Past Surgical History:   Procedure Laterality Date    Brain surgery  2007    removal brain tumor    Hysterectomy      Removal of kidney stone      Removal of lung,lobectomy      Tonsillectomy      Wom w/ cydney mastec or hx of mastec         Social History     Socioeconomic History    Marital status:    Tobacco Use    Smoking status: Former     Current packs/day: 0.00     Types: Cigarettes     Quit date: 2018     Years since quittin.0    Smokeless tobacco: Never   Vaping Use    Vaping status: Never Used   Substance and Sexual Activity    Alcohol use: Not Currently    Drug use: Not Currently   Other Topics Concern    Caffeine Concern Yes     Comment: coffee occ, pepsi    Exercise Yes     Comment: walking       Family History   Problem Relation Age of Onset    Heart Disease Father     Substance Abuse Son     Diabetes Sister     Heart Disease Sister        Oliver Pierre MD, FAES, FAAN  Board-Certified in Neurology, Epilepsy, and Clinical Neurophysiology  [facility] [place]

## 2024-09-24 LAB — FOLATE SERPL-MCNC: 25.9 NG/ML (ref 5.4–?)

## 2024-09-25 ENCOUNTER — TELEPHONE (OUTPATIENT)
Dept: SURGERY | Facility: CLINIC | Age: 64
End: 2024-09-25

## 2024-09-25 LAB
CAOX DIHYDRATE: 60 %
CAOX MONOHYDRATE: 40 %
WEIGHT-STONE: 169 MG

## 2024-09-25 RX ORDER — NITROFURANTOIN 25; 75 MG/1; MG/1
100 CAPSULE ORAL 2 TIMES DAILY
Qty: 14 CAPSULE | Refills: 0 | Status: SHIPPED | OUTPATIENT
Start: 2024-09-25 | End: 2024-10-02

## 2024-09-25 NOTE — TELEPHONE ENCOUNTER
This encounter is now closed.     RN called patient and relayed result and MD's recommendations. Antibiotic sent to [facility]. To start tomorrow, 7 days before surgery. Patient agreed to plans.     \"Please let patient know that I have sent her an antibiotic called Augmentin to take 7 days before surgery- she should start this tomorrow. \"      Thanks  SD    URINE CULTURE 50,000-99,000 CFU/ML Enterococcus faecalis Abnormal    Amoxicillin or Nitrofurantoin remain the drugs of choice for uncomplicated urinary tract infections.        Resulting Agency: [facility] ([facility])  Specimen Collected: 09/23/24  2:46 PM Last Resulted: 09/25/24 10:18 AM           Medication Detail    Medication Quantity Refills Start End   nitrofurantoin monohydrate macro 100 MG Oral Cap 14 capsule 0 9/25/2024 10/2/2024   Sig:   Take 1 capsule (100 mg total) by mouth 2 (two) times daily for 7 days.       Pharmacy    [id number] - [place], [place] - [address] AT [facility] [address], [phone], [phone]

## 2024-09-25 NOTE — TELEPHONE ENCOUNTER
Please let patient know that I have sent her an antibiotic called Augmentin to take 7 days before surgery- she should start this tomorrow.        Thanks  SD

## 2024-09-25 NOTE — TELEPHONE ENCOUNTER
----- Message from Joselo Dunn sent at 9/25/2024  2:34 PM CDT -----  Please let patient know that I have sent her an antibiotic called Augmentin to take 7 days before surgery- she should start this tomorrow.        Thanks  SD

## 2024-09-30 NOTE — H&P
UROLOGY PRE-OPERATIVE HISTORY & PHYSICAL      Юлия Charles Patient Status:  Hospital Outpatient Surgery    1960 MRN [id number]   Location [facility] SURGERY Attending Joselo Dunn MD   Hosp Day # 0 PCP Patrizia Louise MD     Primary Care Provider: Patrizia Louise MD     Procedure       CYSTOSCOPY, RIGHT URETEROSCOPY, LASER LITHOTRIPSY, STENT EXCHANGE:     History of Present Illness:      Patient is a(n) 64 year old female with hx of right breast cx s/p b/l mastectomy in . COPD, HLD, intracranial tumor s/p  shunt who presents for kidney stones, here for second look R URS.      Pt recently had KUB done for constipation on 24. Right kidney: 1.3 x 1.7 cm right renal pelvis stone, numerous smaller stones in the mid lower pole of the right kidney. Left kidney: 13 x 7 mm mid lower pole, 9 x 12 mm lower pole, 6 x 4 mm midpole,  2 mm possible distal ureteral stone vs. Phlebolith.   She then saw our team for the above. Had very mild RLQ pain that began a wk ago. No urinary complaints. Had urosepsis in  that required hospitalization but was never told she had stones. At the time did not have any urinary complaints. Otherwise, UTIs infrequently.   Drinks 64oz+ water daily, used to have high salt diet but has since cut down. Unsure if fhx of stones. Never passed stone before.      A CT scan was obtained;   -Moderate right-sided hydronephrosis with a 1.6 x 1.1 cm calculus within the right renal pelvis.  No ureteral calculi are noted.  There are nonobstructing bilateral renal calculi present.     On my review; large stones bilaterally including in the left renal pelvic without hydronephrosis.   She was previously counseled on options and opted for URS.    Before patient was scheduled for surgery, I called patient on 2024.  I discussed that she has very large stones bilaterally.  Her right side stone burden appears to be obstructing her at the renal pelvis with a large 1.6 cm stone.  In the  lower pole she has more than 2 cm and of additional stone, it is unclear if this is intraparenchymal or not.  I also discussed that her left side has a stone that appears close to the renal pelvis, no obstruction at this time.  I reiterated treatment options which would include PCNL, ureteroscopy. I discussed both surgeries in detail.  I discussed that ureteroscopy may take several procedures to fully clear her out.  I did offer her staged procedures where we could complete the right sided ureteroscopy and stent the left side to allow for interval ureteroscopy.  For now, she is hesitant to have bilateral ureteral stents.  She is not symptomatic on the left side and wants to avoid intervention on this side for now.  She would like to proceed with right sided ureteroscopy as noted above.  She understands the risks of needing several procedures to clear out her stones.  She wants to avoid inpatient admission, more invasive procedures such as PCNL.    She presented for surgery on 9/17;   Findings:  Normal urethra without strictures. No bladder lesions. Right RPG with large radiopaque stones bilaterally, URS with large ~1.5 cm stone slightly embedded at renal pelvis; lower pole with additional ~1cm stone and large cluster of 2-3mm stones. All stone fragmented/dusted and basket extracted; given extensive stone and poor visualization at conclusion plan for second look to clear out all stone; suspect majority of stone treated.   6x24 JJ stent placed without issue    Now presents for second look URS on right side. Again did not want left side treated as wants to avoid bilateral stents.     The risks discussed included, but were not limited to, urinary tract infection, sepsis, bleeding, injury to urethra/bladder/ureter, urethra stricture, ureteral stricture, inability to treat stone. Patient agreeable to proceed and informed consent obtained.      Ucx: Enterococcus; macrobid before   - unasyn intra-op   Stent size   6x24   5'  2\" (1.575 m)    History:     Past Medical History:    Arthritis    Bloating    Blood in urine    Breast cancer, right breast (HCC)    Formatting of this note might be different from the original.  S/p b/l mastectomy  In . On Aromacin      Calculus of kidney    Change in hair    Constipation    COPD (chronic obstructive pulmonary disease) (HCC)    No oxygen    Diarrhea, unspecified    Fatigue    Frequent urination    High cholesterol    History of blood transfusion    about 20 years ago    Intracranial tumor (HCC)    Irregular bowel habits    Pain in joints    Personal history of antineoplastic chemotherapy    Last treatment     Stool incontinence    Stress    Visual impairment    glasses       Past Surgical History:   Procedure Laterality Date    Brain surgery      removal brain tumor    Hysterectomy      Removal of kidney stone      Removal of lung,lobectomy      Tonsillectomy      Wom w/ cydney mastec or hx of mastec         Family History   Problem Relation Age of Onset    Heart Disease Father     Substance Abuse Son     Diabetes Sister     Heart Disease Sister        Social History     Socioeconomic History    Marital status:    Tobacco Use    Smoking status: Former     Current packs/day: 0.00     Types: Cigarettes     Quit date: 2018     Years since quittin.0    Smokeless tobacco: Never   Vaping Use    Vaping status: Never Used   Substance and Sexual Activity    Alcohol use: Not Currently    Drug use: Not Currently   Other Topics Concern    Caffeine Concern Yes     Comment: coffee occ, pepsi    Exercise Yes     Comment: walking     Social Determinants of Health     Financial Resource Strain: Patient Declined (2024)    Received from [facility]    Overall Financial Resource Strain (CARDIA)     Difficulty of Paying Living Expenses: Patient declined   Food Insecurity: Patient Declined (2024)    Received from [facility]    Hunger Vital Sign     Worried About Running Out of Food in the Last Year: Patient declined     Ran Out of Food in the Last Year: Patient declined   Transportation Needs: Patient Declined (8/25/2024)    Received from [facility]    PRAPARE - Transportation     Lack of Transportation (Medical): Patient declined     Lack of Transportation (Non-Medical): Patient declined   Physical Activity: Patient Declined (8/25/2024)    Received from [facility]    Exercise Vital Sign     Days of Exercise per Week: Patient declined     Minutes of Exercise per Session: Patient declined   Stress: Patient Declined (8/25/2024)    Received from [facility]    [employer] [place] [employer] - Occupational Stress Questionnaire     Feeling of Stress : Patient declined   Social Connections: Patient Declined (8/25/2024)    Received from [facility]    Social Connection and Isolation Panel [NHANES]     Frequency of Communication with Friends and Family: Patient declined     Frequency of Social Gatherings with Friends and Family: Patient declined     Attends Worship Services: Patient declined     Active Member of Clubs or Organizations: Patient declined     Attends Club or Organization Meetings: Patient declined     Marital Status: Patient declined   Housing Stability: Patient Declined (8/25/2024)    Received from [facility]    Housing Stability Vital Sign     Unable to Pay for Housing in the Last Year: Patient declined     Number of Times Moved in the Last Year: 0     Homeless in the Last Year: Patient declined       Medications:  Current Outpatient Medications   Medication Sig Dispense Refill    tamsulosin 0.4 MG Oral Cap Take 1 capsule (0.4 mg total) by mouth every evening for 14 days. 14 capsule 0    atorvastatin 10 MG Oral Tab Take 1 tablet (10 mg  total) by mouth nightly. 90 tablet 0    acetaminophen 325 MG Oral Tab Take 1 tablet (325 mg total) by mouth every 6 (six) hours as needed for Pain.      albuterol (PROAIR HFA) 108 (90 Base) MCG/ACT Inhalation Aero Soln Inhale 2 puffs into the lungs every 4 (four) hours as needed for Wheezing or Shortness of Breath. 1 each 1    nitrofurantoin monohydrate macro 100 MG Oral Cap Take 1 capsule (100 mg total) by mouth 2 (two) times daily for 7 days. 14 capsule 0    Wheat Dextrin (BENEFIBER OR) Take by mouth.         Allergies:  Allergies   Allergen Reactions    Amoxicillin NAUSEA ONLY    Tamoxifen OTHER (SEE COMMENTS)     Very emotional         Review of Systems:   A comprehensive 10-point review of systems was completed.  Pertinent positives and negatives are noted in the the HPI.    Physical Exam:   Vital Signs:  Height 5' 2\" (1.575 m), weight 147 lb (66.7 kg).     CONSTITUTIONAL: Well developed, well nourished, in no acute distress   RESPIRATORY: Normal respiratory effort  ABDOMEN: Soft, non-tender, non-distended  ffffffffff     Laboratory Data:  Lab Results   Component Value Date    WBC 5.2 07/16/2024    HGB 13.9 07/16/2024    .0 07/16/2024     Lab Results   Component Value Date     07/16/2024    K 3.8 07/16/2024     07/16/2024    CO2 29.0 07/16/2024    BUN 14 07/16/2024    GLU 94 07/16/2024    GFRAA 111 09/13/2021    AST 18 07/16/2024    ALT 23 07/16/2024    TP 7.4 07/16/2024    ALB 4.4 07/16/2024    CA 9.6 07/16/2024       Urinalysis Results (last three years):  Recent Labs     09/11/24  1415   SPECGRAVITY 1.015   PHURINE 6.0   NITRITE Negative       Urine Culture Results (last three years):  Lab Results   Component Value Date    URINECUL 50,000-99,000 CFU/ML Enterococcus faecalis NOT VRE (A) 09/23/2024    URINECUL No Growth 2 Days 09/11/2024       PSA:  No results found for: \"PSA\", \"PERCENTPSA\", \"PSAS\", \"PSAULTRA\"     Imaging (last three days):  No results found.     Assessment:   Юлия STARKEY  Melvin is a 64 year old y/o female who presents for the above stated procedure.       Plan:     - OR for CYSTOSCOPY, RIGHT URETEROSCOPY, LASER LITHOTRIPSY, STENT EXCHANGE:   - NPO since midnight   - Antibiotics ordered for OR   - Informed consent obtained - risks and benefits explained, and all questions answered  - Marked appropriately     I have personally reviewed all relevant medical records, labs, and imaging.       Joselo Dunn MD  Staff Urologist  [facility]  Office: [phone]

## 2024-10-02 ENCOUNTER — ANESTHESIA EVENT (OUTPATIENT)
Dept: SURGERY | Facility: HOSPITAL | Age: 64
End: 2024-10-02
Payer: MEDICARE

## 2024-10-03 ENCOUNTER — APPOINTMENT (OUTPATIENT)
Dept: GENERAL RADIOLOGY | Facility: HOSPITAL | Age: 64
End: 2024-10-03
Attending: UROLOGY
Payer: MEDICARE

## 2024-10-03 ENCOUNTER — HOSPITAL ENCOUNTER (OUTPATIENT)
Facility: HOSPITAL | Age: 64
Setting detail: HOSPITAL OUTPATIENT SURGERY
Discharge: HOME | End: 2024-10-03
Attending: UROLOGY | Admitting: UROLOGY
Payer: MEDICARE

## 2024-10-03 ENCOUNTER — ANESTHESIA (OUTPATIENT)
Dept: SURGERY | Facility: HOSPITAL | Age: 64
End: 2024-10-03
Payer: MEDICARE

## 2024-10-03 VITALS
SYSTOLIC BLOOD PRESSURE: 135 MMHG | HEIGHT: 62 IN | RESPIRATION RATE: 16 BRPM | BODY MASS INDEX: 27.23 KG/M2 | DIASTOLIC BLOOD PRESSURE: 68 MMHG | WEIGHT: 148 LBS | TEMPERATURE: 97 F | OXYGEN SATURATION: 93 % | HEART RATE: 80 BPM

## 2024-10-03 DIAGNOSIS — Z01.818 PRE-OP TESTING: ICD-10-CM

## 2024-10-03 DIAGNOSIS — N20.0 NEPHROLITHIASIS: Primary | ICD-10-CM

## 2024-10-03 PROCEDURE — 0TC08ZZ EXTIRPATION OF MATTER FROM RIGHT KIDNEY, VIA NATURAL OR ARTIFICIAL OPENING ENDOSCOPIC: ICD-10-PCS | Performed by: UROLOGY

## 2024-10-03 PROCEDURE — 52356 CYSTO/URETERO W/LITHOTRIPSY: CPT | Performed by: UROLOGY

## 2024-10-03 PROCEDURE — 74420 UROGRAPHY RTRGR +-KUB: CPT | Performed by: UROLOGY

## 2024-10-03 PROCEDURE — 0T768DZ DILATION OF RIGHT URETER WITH INTRALUMINAL DEVICE, VIA NATURAL OR ARTIFICIAL OPENING ENDOSCOPIC: ICD-10-PCS | Performed by: UROLOGY

## 2024-10-03 DEVICE — URETERAL STENT
Type: IMPLANTABLE DEVICE | Site: URETER | Status: FUNCTIONAL
Brand: ASCERTA™

## 2024-10-03 RX ORDER — HYDROMORPHONE HYDROCHLORIDE 1 MG/ML
0.4 INJECTION, SOLUTION INTRAMUSCULAR; INTRAVENOUS; SUBCUTANEOUS EVERY 5 MIN PRN
Status: DISCONTINUED | OUTPATIENT
Start: 2024-10-03 | End: 2024-10-03

## 2024-10-03 RX ORDER — ACETAMINOPHEN 500 MG
1000 TABLET ORAL ONCE
Status: DISCONTINUED | OUTPATIENT
Start: 2024-10-03 | End: 2024-10-03 | Stop reason: HOSPADM

## 2024-10-03 RX ORDER — ACETAMINOPHEN 500 MG
1000 TABLET ORAL ONCE AS NEEDED
Status: DISCONTINUED | OUTPATIENT
Start: 2024-10-03 | End: 2024-10-03

## 2024-10-03 RX ORDER — MIDAZOLAM HYDROCHLORIDE 1 MG/ML
INJECTION INTRAMUSCULAR; INTRAVENOUS AS NEEDED
Status: DISCONTINUED | OUTPATIENT
Start: 2024-10-03 | End: 2024-10-03 | Stop reason: SURG

## 2024-10-03 RX ORDER — POLYETHYLENE GLYCOL 3350 17 G/17G
17 POWDER, FOR SOLUTION ORAL DAILY PRN
Qty: 20 PACKET | Refills: 1 | Status: SHIPPED | OUTPATIENT
Start: 2024-10-03

## 2024-10-03 RX ORDER — HYDROCODONE BITARTRATE AND ACETAMINOPHEN 5; 325 MG/1; MG/1
2 TABLET ORAL ONCE AS NEEDED
Status: DISCONTINUED | OUTPATIENT
Start: 2024-10-03 | End: 2024-10-03

## 2024-10-03 RX ORDER — LIDOCAINE HYDROCHLORIDE 20 MG/ML
JELLY TOPICAL AS NEEDED
Status: DISCONTINUED | OUTPATIENT
Start: 2024-10-03 | End: 2024-10-03 | Stop reason: HOSPADM

## 2024-10-03 RX ORDER — MIDAZOLAM HYDROCHLORIDE 1 MG/ML
1 INJECTION INTRAMUSCULAR; INTRAVENOUS EVERY 5 MIN PRN
Status: DISCONTINUED | OUTPATIENT
Start: 2024-10-03 | End: 2024-10-03

## 2024-10-03 RX ORDER — LABETALOL HYDROCHLORIDE 5 MG/ML
5 INJECTION, SOLUTION INTRAVENOUS EVERY 5 MIN PRN
Status: DISCONTINUED | OUTPATIENT
Start: 2024-10-03 | End: 2024-10-03

## 2024-10-03 RX ORDER — LIDOCAINE HYDROCHLORIDE 10 MG/ML
INJECTION, SOLUTION EPIDURAL; INFILTRATION; INTRACAUDAL; PERINEURAL AS NEEDED
Status: DISCONTINUED | OUTPATIENT
Start: 2024-10-03 | End: 2024-10-03 | Stop reason: SURG

## 2024-10-03 RX ORDER — SODIUM CHLORIDE, SODIUM LACTATE, POTASSIUM CHLORIDE, CALCIUM CHLORIDE 600; 310; 30; 20 MG/100ML; MG/100ML; MG/100ML; MG/100ML
INJECTION, SOLUTION INTRAVENOUS CONTINUOUS
Status: DISCONTINUED | OUTPATIENT
Start: 2024-10-03 | End: 2024-10-03

## 2024-10-03 RX ORDER — NALOXONE HYDROCHLORIDE 0.4 MG/ML
0.08 INJECTION, SOLUTION INTRAMUSCULAR; INTRAVENOUS; SUBCUTANEOUS AS NEEDED
Status: DISCONTINUED | OUTPATIENT
Start: 2024-10-03 | End: 2024-10-03

## 2024-10-03 RX ORDER — NITROFURANTOIN 25; 75 MG/1; MG/1
100 CAPSULE ORAL 2 TIMES DAILY
Qty: 6 CAPSULE | Refills: 0 | Status: SHIPPED | OUTPATIENT
Start: 2024-10-04 | End: 2024-10-07

## 2024-10-03 RX ORDER — KETOROLAC TROMETHAMINE 30 MG/ML
INJECTION, SOLUTION INTRAMUSCULAR; INTRAVENOUS AS NEEDED
Status: DISCONTINUED | OUTPATIENT
Start: 2024-10-03 | End: 2024-10-03 | Stop reason: SURG

## 2024-10-03 RX ORDER — PHENYLEPHRINE HCL 10 MG/ML
VIAL (ML) INJECTION AS NEEDED
Status: DISCONTINUED | OUTPATIENT
Start: 2024-10-03 | End: 2024-10-03 | Stop reason: SURG

## 2024-10-03 RX ORDER — HYDROMORPHONE HYDROCHLORIDE 1 MG/ML
0.2 INJECTION, SOLUTION INTRAMUSCULAR; INTRAVENOUS; SUBCUTANEOUS EVERY 5 MIN PRN
Status: DISCONTINUED | OUTPATIENT
Start: 2024-10-03 | End: 2024-10-03

## 2024-10-03 RX ORDER — ONDANSETRON 2 MG/ML
INJECTION INTRAMUSCULAR; INTRAVENOUS AS NEEDED
Status: DISCONTINUED | OUTPATIENT
Start: 2024-10-03 | End: 2024-10-03 | Stop reason: SURG

## 2024-10-03 RX ORDER — TAMSULOSIN HYDROCHLORIDE 0.4 MG/1
0.4 CAPSULE ORAL EVERY EVENING
Qty: 14 CAPSULE | Refills: 0 | Status: SHIPPED | OUTPATIENT
Start: 2024-10-03 | End: 2024-10-17

## 2024-10-03 RX ORDER — HYDROMORPHONE HYDROCHLORIDE 1 MG/ML
0.6 INJECTION, SOLUTION INTRAMUSCULAR; INTRAVENOUS; SUBCUTANEOUS EVERY 5 MIN PRN
Status: DISCONTINUED | OUTPATIENT
Start: 2024-10-03 | End: 2024-10-03

## 2024-10-03 RX ORDER — OXYBUTYNIN CHLORIDE 5 MG/1
5 TABLET ORAL 3 TIMES DAILY PRN
Qty: 15 TABLET | Refills: 0 | Status: SHIPPED | OUTPATIENT
Start: 2024-10-03

## 2024-10-03 RX ORDER — DIPHENHYDRAMINE HYDROCHLORIDE 50 MG/ML
12.5 INJECTION INTRAMUSCULAR; INTRAVENOUS AS NEEDED
Status: DISCONTINUED | OUTPATIENT
Start: 2024-10-03 | End: 2024-10-03

## 2024-10-03 RX ORDER — HYDROCODONE BITARTRATE AND ACETAMINOPHEN 5; 325 MG/1; MG/1
1 TABLET ORAL ONCE AS NEEDED
Status: DISCONTINUED | OUTPATIENT
Start: 2024-10-03 | End: 2024-10-03

## 2024-10-03 RX ORDER — ONDANSETRON 2 MG/ML
4 INJECTION INTRAMUSCULAR; INTRAVENOUS EVERY 6 HOURS PRN
Status: DISCONTINUED | OUTPATIENT
Start: 2024-10-03 | End: 2024-10-03

## 2024-10-03 RX ORDER — METOCLOPRAMIDE HYDROCHLORIDE 5 MG/ML
10 INJECTION INTRAMUSCULAR; INTRAVENOUS EVERY 8 HOURS PRN
Status: DISCONTINUED | OUTPATIENT
Start: 2024-10-03 | End: 2024-10-03

## 2024-10-03 RX ORDER — MEPERIDINE HYDROCHLORIDE 25 MG/ML
12.5 INJECTION INTRAMUSCULAR; INTRAVENOUS; SUBCUTANEOUS AS NEEDED
Status: DISCONTINUED | OUTPATIENT
Start: 2024-10-03 | End: 2024-10-03

## 2024-10-03 RX ORDER — SCOLOPAMINE TRANSDERMAL SYSTEM 1 MG/1
1 PATCH, EXTENDED RELEASE TRANSDERMAL ONCE
Status: DISCONTINUED | OUTPATIENT
Start: 2024-10-03 | End: 2024-10-03 | Stop reason: HOSPADM

## 2024-10-03 RX ORDER — PHENAZOPYRIDINE HYDROCHLORIDE 100 MG/1
100 TABLET, FILM COATED ORAL 3 TIMES DAILY PRN
Qty: 10 TABLET | Refills: 0 | Status: SHIPPED | OUTPATIENT
Start: 2024-10-03

## 2024-10-03 RX ORDER — DEXAMETHASONE SODIUM PHOSPHATE 4 MG/ML
VIAL (ML) INJECTION AS NEEDED
Status: DISCONTINUED | OUTPATIENT
Start: 2024-10-03 | End: 2024-10-03 | Stop reason: SURG

## 2024-10-03 RX ADMIN — SODIUM CHLORIDE, SODIUM LACTATE, POTASSIUM CHLORIDE, CALCIUM CHLORIDE: 600; 310; 30; 20 INJECTION, SOLUTION INTRAVENOUS at 15:18:00

## 2024-10-03 RX ADMIN — PHENYLEPHRINE HCL 100 MCG: 10 MG/ML VIAL (ML) INJECTION at 15:38:00

## 2024-10-03 RX ADMIN — PHENYLEPHRINE HCL 100 MCG: 10 MG/ML VIAL (ML) INJECTION at 15:44:00

## 2024-10-03 RX ADMIN — PHENYLEPHRINE HCL 100 MCG: 10 MG/ML VIAL (ML) INJECTION at 15:29:00

## 2024-10-03 RX ADMIN — ONDANSETRON 4 MG: 2 INJECTION INTRAMUSCULAR; INTRAVENOUS at 15:30:00

## 2024-10-03 RX ADMIN — MIDAZOLAM HYDROCHLORIDE 2 MG: 1 INJECTION INTRAMUSCULAR; INTRAVENOUS at 15:18:00

## 2024-10-03 RX ADMIN — SODIUM CHLORIDE, SODIUM LACTATE, POTASSIUM CHLORIDE, CALCIUM CHLORIDE: 600; 310; 30; 20 INJECTION, SOLUTION INTRAVENOUS at 16:33:00

## 2024-10-03 RX ADMIN — LIDOCAINE HYDROCHLORIDE 5 ML: 10 INJECTION, SOLUTION EPIDURAL; INFILTRATION; INTRACAUDAL; PERINEURAL at 15:24:00

## 2024-10-03 RX ADMIN — KETOROLAC TROMETHAMINE 30 MG: 30 INJECTION, SOLUTION INTRAMUSCULAR; INTRAVENOUS at 16:25:00

## 2024-10-03 RX ADMIN — DEXAMETHASONE SODIUM PHOSPHATE 4 MG: 4 MG/ML VIAL (ML) INJECTION at 15:26:00

## 2024-10-03 NOTE — OPERATIVE REPORT
Urology Operative Note    Attending Surgeon: Joselo Dunn MD    Assistant Surgeon: None    Patient Name: Юлия Charles    Date of Surgery: 10/3/2024    Preoperative Diagnosis: right nephrolithiasis    Postoperative Diagnosis: Same    Procedure Performed:   Cystoscopy, right ureteroscopy, laser lithotripsy, basket stone extraction, retrograde pyelogram, ureteral stent exchange      Indication:     Patient is a(n) 64 year old female with hx of right breast cx s/p b/l mastectomy in 2008. COPD, HLD, intracranial tumor s/p  shunt who presents for kidney stones, here for second look R URS.      Pt recently had KUB done for constipation on 9/5/24. Right kidney: 1.3 x 1.7 cm right renal pelvis stone, numerous smaller stones in the mid lower pole of the right kidney. Left kidney: 13 x 7 mm mid lower pole, 9 x 12 mm lower pole, 6 x 4 mm midpole,  2 mm possible distal ureteral stone vs. Phlebolith.   She then saw our team for the above. Had very mild RLQ pain that began a wk ago. No urinary complaints. Had urosepsis in 2020 that required hospitalization but was never told she had stones. At the time did not have any urinary complaints. Otherwise, UTIs infrequently.   Drinks 64oz+ water daily, used to have high salt diet but has since cut down. Unsure if fhx of stones. Never passed stone before.       A CT scan was obtained;   -Moderate right-sided hydronephrosis with a 1.6 x 1.1 cm calculus within the right renal pelvis.  No ureteral calculi are noted.  There are nonobstructing bilateral renal calculi present.     On my review; large stones bilaterally including in the left renal pelvic without hydronephrosis.   She was previously counseled on options and opted for URS.     Before patient was scheduled for surgery, I called patient on 9-.  I discussed that she has very large stones bilaterally.  Her right side stone burden appears to be obstructing her at the renal pelvis with a large 1.6 cm stone.  In the lower  pole she has more than 2 cm and of additional stone, it is unclear if this is intraparenchymal or not.  I also discussed that her left side has a stone that appears close to the renal pelvis, no obstruction at this time.  I reiterated treatment options which would include PCNL, ureteroscopy. I discussed both surgeries in detail.  I discussed that ureteroscopy may take several procedures to fully clear her out.  I did offer her staged procedures where we could complete the right sided ureteroscopy and stent the left side to allow for interval ureteroscopy.  For now, she is hesitant to have bilateral ureteral stents.  She is not symptomatic on the left side and wants to avoid intervention on this side for now.  She would like to proceed with right sided ureteroscopy as noted above.  She understands the risks of needing several procedures to clear out her stones.  She wants to avoid inpatient admission, more invasive procedures such as PCNL.     She presented for surgery on 9/17;   Findings:  Normal urethra without strictures. No bladder lesions. Right RPG with large radiopaque stones bilaterally, URS with large ~1.5 cm stone slightly embedded at renal pelvis; lower pole with additional ~1cm stone and large cluster of 2-3mm stones. All stone fragmented/dusted and basket extracted; given extensive stone and poor visualization at conclusion plan for second look to clear out all stone; suspect majority of stone treated.   6x24 JJ stent placed without issue     Now presents for second look URS on right side. Again did not want left side treated as wants to avoid bilateral stents.      The risks discussed included, but were not limited to, urinary tract infection, sepsis, bleeding, injury to urethra/bladder/ureter, urethra stricture, ureteral stricture, inability to treat stone. Patient agreeable to proceed and informed consent obtained.       Findings:  Normal urethra. No bladder lesions. Stent not encrusted. Right URS  with several stone fragments in lower and upper pole; largest remaining fragment ~6mm. Fragments all dusted and basket extracted. No large remaining stones. 6x24 JJ stent placed without issue.       Procedure:  The patient was taken to the operating room and a timeout was performed confirming the correct patient and procedure. The patient was prepped and draped in lithotomy position after undergoing general anesthesia. Pre-operative prophylactic antibiotics were given in the form of unasyn.     The cystoscope was inserted per urethra and the bladder was inspected and drained. The  RIGHT ureteral stent was grasped and pulled to the urethral meatus with a cystoscopic grasper. The stent was cannulated with a Sensor wire, and the wire was passed into the renal pelvis which I confirmed using fluoroscopy. The stent was fully removed.    Then a dual lumen catheter was passed over the wire and into the distal ureter. A retrograde pyelogram was obtained demonstrating appropriate wire placement; no hydronephrosis. I then placed a second sensor wire through the dual lumen catheter under fluoroscopy. Once both wires were confirmed to be in the kidney I removed the dual lumen catheter. One wire was then secured to the drapes as a safety wire while the other was our working wire.      Over our working wire we gently introduced a 12/14F x 28cm ureteral access sheath under fluoroscopic visualization to the level of the proximal ureter. This passed easily. Once that was done, we then removed the inner sheath and wire.  The flexible ureteroscope was advanced into the sheath and up into the right renal pelvis.  A systematic inspection of the kidney revealed several remaining stone fragments in the lower and upper pole; largest was about ~6mm in size. The stones were fragmented and dusted using a laser, and then the fragments were extracted using a zero tip basket. Any smaller remaining stone fragments were lasered to dust, and  thoroughly irrigated. All renal calyces were surveyed once more, and no large fragments were seen. A retrograde pyelogram was performed through the scope and showed no extravasation. The ureter was inspected while slowly removing the scope and access sheath, and it appeared healthy without stone fragments seen.    We then placed a 6x24 JJ  double-J ureteral stent over our safety wire under direct cystoscopic and fluoroscopic guidance. The proximal and distal curls formed appropriately. Stent left without strings. The bladder was then drained and the cystoscope was removed. The procedure was then terminated.      The patient was awoken from anesthesia and transferred to PACU in stable condition. The patient tolerated the procedure well. All instrument/supply counts were correct at the end of the case.    Specimens:   Stone fragments for chemical analysis    Estimated Blood Loss:  1 mL    Tubes/Drains:  6-Maltese x 24 cm JJ right ureteral stent    Complications:   None immediate    Condition from OR:  Stable    Plan:   Abx x3 days    Return for stent removal next week as scheduled      Joselo Dunn MD  Staff Urologist  [facility]  Office: [phone]

## 2024-10-03 NOTE — ANESTHESIA PROCEDURE NOTES
Airway  Date/Time: 10/3/2024 3:25 PM  Urgency: elective      General Information and Staff    Patient location during procedure: OR  Anesthesiologist: María Elena Silva MD  Performed: anesthesiologist   Performed by: María Elena Silva MD  Authorized by: María Elena Silva MD      Indications and Patient Condition  Indications for airway management: anesthesia  Sedation level: deep  Preoxygenated: yes  Patient position: sniffing  Mask difficulty assessment: 1 - vent by mask    Final Airway Details  Final airway type: supraglottic airway      Successful airway: classic  Size 3       Number of attempts at approach: 1

## 2024-10-03 NOTE — DISCHARGE INSTRUCTIONS
You had cystoscopy, ureteroscopy, and stent placement in the operating room today.    Instructions:    - No heavy lifting or strenuous activity for 1 day. You may resume regular activity tomorrow.       - Your follow-up appointment is listed below. Please contact us at [phone] if you need to change your appointment.      Your appointments       Date & Time Appointment Department ([place])    Oct 10, 2024 11:00 AM CDT Procedure with Joselo Dunn MD [facility] ([place] [address])        Jan 02, 2025 2:20 PM CST Follow Up Visit with Oliver Pierre MD [facility] ([facility])              [facility]  [address]  [facility] [id number] [phone] [facility]  [address]  [facility] [id number] [phone]             -  You have a stent (small plastic tube) inside your kidney and ureter to allow the swelling from surgery to resolve. This is only temporary and must be removed, so you should not forget about it. We will remove your stent via a brief cystoscopy in clinic when you return. If you have to miss this appointment for some reason please make sure you re-schedule it.      - take the antibiotics for 3 more days     - With a ureteral stent in place you may have some discomfort on your side/flank. You can feel pain worsen when you urinate, so try to avoid holding urine and void every 2-3 hours. You also may notice increased blood in the urine as you increase your activity. If this happens increase your hydration and take it easy for a day. Warm baths/hot packs can help with the discomfort as well as your prescribed medications and Advil/Motrin (if you are able to take these).     - You may experience mild pain after the procedure  for a few days.  If the pain becomes intolerable please contact our office or go to the nearest Emergency Room or Urgent Care. You should take over the counter ibuprofen (AKA motrin, advil) for mild pain (provided you do not have a medical condition such as stomach ulcers or kidney disease which prohibits you from taking these). You may alternate this with tylenol as well. If pain is still not relieved by tylenol and/or ibuprofen, you may take narcotic pain medication if prescribed (typically oxycodone or tramadol). If you are taking narcotic pain medication this can make you constipated, so you should take over the counter stool softeners or miralax if prescribed.    - Warm pack or hot baths often help with discomfort after cystoscopy.    - If you take blood thinners (such as aspirin or plavix) please hold these medications until 3 days after surgery.     - You may experience burning and frequency of urination over the next few days. This will improve after a few days if you stay well hydrated. If you were prescribed phenazopyridine (Pyridium) this may relieve urinary discomfort but you can only take this for 3 days. Pyridium will make your urine orange.     - You are likely to see some blood in your urine (pink or light red urine) that should clear up within a few days. Staying well hydrated should help this clear up. If you notice the urine stays dark red or there are multiple large blood clots despite good hydration, please call the urology clinic ([phone]).     - Try to abstain from alcohol, coffee, tea, artificial sweeteners, and spicy food for the next 48 hours as these can irritate the bladder.     - If you develop fevers / chills, difficulty urinating, or abdominal pain that does not improve with pain medications, please call the office.     - Drink 1.5 to 2 liters of fluid today (water is preferable). If you are on a fluid restriction due to other medical reasons then you need to adhere to your fluid  restriction recommendations.      Joselo Dunn MD  Staff Urologist  [facility]  Office: [phone]    You received a drug called Toradol which is an Anti Inflammatory at: 4:25pm  If you are allowed to take Anti inflammatories:    Do not take any Anti Inflammatory like Motrin, Aleve or Ibuprophen until after: 10:25pm  Please report any suspected allergic reactions or bleeding issues to your doctor

## 2024-10-03 NOTE — ANESTHESIA PREPROCEDURE EVALUATION
PRE-OP EVALUATION    Patient Name: Юлия Charles    Admit Diagnosis: Nephrolithiasis [N20.0]    Pre-op Diagnosis: Nephrolithiasis [N20.0]    CYSTOSCOPY, RIGHT URETEROSCOPY, LASER LITHOTRIPSY, STENT EXCHANGE    Anesthesia Procedure: CYSTOSCOPY, RIGHT URETEROSCOPY, LASER LITHOTRIPSY, STENT EXCHANGE (Right: Ureter)    Surgeons and Role:     * Joselo Dunn MD - Primary    Pre-op vitals reviewed.  Temp: 97.5 °F (36.4 °C)  Pulse: 75  Resp: 16  BP: 123/80  SpO2: 98 %  Body mass index is 27.07 kg/m².    Current medications reviewed.  Hospital Medications:  • acetaminophen (Tylenol Extra Strength) tab 1,000 mg  1,000 mg Oral Once   • scopolamine (Transderm-Scop) 1 MG/3DAYS patch 1 patch  1 patch Transdermal Once   • lactated ringers infusion   Intravenous Continuous   • ampicillin-sulbactam (Unasyn) 3 g in sodium chloride 0.9% 100mL IVPB-ADD  3 g Intravenous Once       Outpatient Medications:     Medications Prior to Admission   Medication Sig Dispense Refill Last Dose   • Wheat Dextrin (BENEFIBER OR) Take by mouth.   10/2/2024 at 1200   • [] tamsulosin 0.4 MG Oral Cap Take 1 capsule (0.4 mg total) by mouth every evening for 14 days. 14 capsule 0    • [] sulfamethoxazole-trimethoprim DS (BACTRIM DS) 800-160 MG Oral Tab per tablet Take 1 tablet by mouth 2 (two) times daily for 3 days. 6 tablet 0    • atorvastatin 10 MG Oral Tab Take 1 tablet (10 mg total) by mouth nightly. 90 tablet 0 10/2/2024 at 2100   • acetaminophen 325 MG Oral Tab Take 1 tablet (325 mg total) by mouth every 6 (six) hours as needed for Pain.      • albuterol (PROAIR HFA) 108 (90 Base) MCG/ACT Inhalation Aero Soln Inhale 2 puffs into the lungs every 4 (four) hours as needed for Wheezing or Shortness of Breath. 1 each 1 10/3/2024 at 0600   • [] nitrofurantoin monohydrate macro 100 MG Oral Cap Take 1 capsule (100 mg total) by mouth 2 (two) times daily for 7 days. 14 capsule 0        Allergies: Amoxicillin and  Tamoxifen      Anesthesia Evaluation    Patient summary reviewed.    Anesthetic Complications  (-) history of anesthetic complications         GI/Hepatic/Renal  Comment: Nephrolithiasis                                Cardiovascular      ECG reviewed.  Exercise tolerance: good     MET: >4         (+) hyperlipidemia                                  Endo/Other                           (+) arthritis       Pulmonary      (+) asthma  (+) COPD     (+) pneumonia (covid about 1 month ago +bacterial pnemonia at [facility] per patient, no ICU)              Neuro/Psych                              Hx breast CA - no limb restrictions      Past Surgical History:   Procedure Laterality Date   • Brain surgery      removal brain tumor   • Hysterectomy     • Removal of kidney stone     • Removal of lung,lobectomy     • Tonsillectomy     • Wom w/ cydney mastec or hx of mastec       Social History     Socioeconomic History   • Marital status:    Tobacco Use   • Smoking status: Former     Current packs/day: 0.00     Types: Cigarettes     Quit date: 2018     Years since quittin.0   • Smokeless tobacco: Never   Vaping Use   • Vaping status: Never Used   Substance and Sexual Activity   • Alcohol use: Not Currently   • Drug use: Not Currently   Other Topics Concern   • Caffeine Concern Yes     Comment: coffee occ, pepsi   • Exercise Yes     Comment: walking     History   Drug Use Unknown     Available pre-op labs reviewed.  Lab Results   Component Value Date    WBC 5.2 2024    RBC 4.54 2024    HGB 13.9 2024    HCT 42.6 2024    MCV 93.8 2024    MCH 30.6 2024    MCHC 32.6 2024    RDW 13.0 2024    .0 2024     Lab Results   Component Value Date     2024    K 3.8 2024     2024    CO2 29.0 2024    BUN 14 2024    CREATSERUM 0.69 2024    GLU 94 2024    CA 9.6 2024            Airway      Mallampati:  II  Mouth opening: >3 FB  TM distance: > 6 cm   Cardiovascular    Cardiovascular exam normal.         Dental  Comment: Discussed risk of dental injury with anesthesia matthew to weakened teeth.            Pulmonary    Pulmonary exam normal.                 Other findings        ASA: 2   Plan: general  NPO status verified and           Plan/risks discussed with: patient            Present on Admission:  **None**

## 2024-10-03 NOTE — ANESTHESIA POSTPROCEDURE EVALUATION
[facility]    Юлия Charles Patient Status:  Hospital Outpatient Surgery   Age/Gender 64 year old female MRN [id number]   Location [facility] SURGERY Attending Joselo Dunn MD   Hosp Day # 0 PCP Patrizia Louise MD       Anesthesia Post-op Note    CYSTOSCOPY, RIGHT URETEROSCOPY, LASER LITHOTRIPSY, STENT EXCHANGE    Procedure Summary       Date: 10/03/24 Room / Location:  MAIN OR 07 / [facility] MAIN OR    Anesthesia Start: 1518 Anesthesia Stop: 1643    Procedure: CYSTOSCOPY, RIGHT URETEROSCOPY, LASER LITHOTRIPSY, STENT EXCHANGE (Right: Ureter) Diagnosis:       Nephrolithiasis      (Nephrolithiasis [N20.0])    Surgeons: Joselo Dunn MD Anesthesiologist: María Elena Silva MD    Anesthesia Type: general ASA Status: 2            Anesthesia Type: general    Vitals Value Taken Time   /87 10/03/24 1643   Temp 98.4 10/03/24 1643   Pulse 81 10/03/24 1643   Resp  10/03/24 1643   SpO2 94 10/03/24 1643       Patient Location: PACU    Anesthesia Type: general    Airway Patency: patent and extubated    Postop Pain Control: adequate    Mental Status: preanesthetic baseline    Nausea/Vomiting: none    Cardiopulmonary/Hydration status: stable euvolemic    Complications: no apparent anesthesia related complications    Postop vital signs: stable    Dental Exam: Unchanged from Preop    Patient to be discharged from PACU when criteria met.

## 2024-10-09 ENCOUNTER — TELEPHONE (OUTPATIENT)
Dept: FAMILY MEDICINE CLINIC | Facility: CLINIC | Age: 64
End: 2024-10-09

## 2024-10-09 NOTE — TELEPHONE ENCOUNTER
Testing due  Letter sent  Future Appointments   Date Time Provider Department Center   10/10/2024 11:00 AM Joselo Dunn MD [id number]   1/2/2025  2:20 PM Oliver Pierre MD ENINAPER [facility]

## 2024-10-10 ENCOUNTER — TELEPHONE (OUTPATIENT)
Dept: SURGERY | Facility: CLINIC | Age: 64
End: 2024-10-10

## 2024-10-10 ENCOUNTER — PROCEDURE (OUTPATIENT)
Dept: SURGERY | Facility: CLINIC | Age: 64
End: 2024-10-10

## 2024-10-10 DIAGNOSIS — N20.0 KIDNEY STONE: Primary | ICD-10-CM

## 2024-10-10 LAB
APPEARANCE: CLEAR
BILIRUBIN: NEGATIVE
GLUCOSE (URINE DIPSTICK): NEGATIVE MG/DL
KETONES (URINE DIPSTICK): NEGATIVE MG/DL
MULTISTIX LOT#: ABNORMAL NUMERIC
NITRITE, URINE: NEGATIVE
PH, URINE: 7 (ref 4.5–8)
PROTEIN (URINE DIPSTICK): 30 MG/DL
SPECIFIC GRAVITY: 1.01 (ref 1–1.03)
URINE-COLOR: YELLOW
UROBILINOGEN,SEMI-QN: 1 MG/DL (ref 0–1.9)

## 2024-10-10 PROCEDURE — 52310 CYSTOSCOPY AND TREATMENT: CPT | Performed by: UROLOGY

## 2024-10-10 PROCEDURE — 81003 URINALYSIS AUTO W/O SCOPE: CPT | Performed by: UROLOGY

## 2024-10-10 PROCEDURE — 99214 OFFICE O/P EST MOD 30 MIN: CPT | Performed by: UROLOGY

## 2024-10-10 RX ORDER — SULFAMETHOXAZOLE/TRIMETHOPRIM 800-160 MG
1 TABLET ORAL ONCE
Status: COMPLETED | OUTPATIENT
Start: 2024-10-10 | End: 2024-10-10

## 2024-10-10 RX ADMIN — SULFAMETHOXAZOLE/TRIMETHOPRIM 1 TABLET: 800-160 MG TABLET ORAL at 10:59:00

## 2024-10-10 NOTE — PROGRESS NOTES
Clinic Procedure Note    INDICATIONS:        Patient is a(n) 64 year old female with hx of right breast cx s/p b/l mastectomy in 2008. COPD, HLD, intracranial tumor s/p  shunt who presents for kidney stones fu sp recent URS- here ofr stent removal.      Pt recently had KUB done for constipation on 9/5/24. Right kidney: 1.3 x 1.7 cm right renal pelvis stone, numerous smaller stones in the mid lower pole of the right kidney. Left kidney: 13 x 7 mm mid lower pole, 9 x 12 mm lower pole, 6 x 4 mm midpole,  2 mm possible distal ureteral stone vs. Phlebolith.   She then saw our team for the above. Had very mild RLQ pain that began a wk ago. No urinary complaints. Had urosepsis in 2020 that required hospitalization but was never told she had stones. At the time did not have any urinary complaints. Otherwise, UTIs infrequently.   Drinks 64oz+ water daily, used to have high salt diet but has since cut down. Unsure if fhx of stones. Never passed stone before.       A CT scan was obtained;   -Moderate right-sided hydronephrosis with a 1.6 x 1.1 cm calculus within the right renal pelvis.  No ureteral calculi are noted.  There are nonobstructing bilateral renal calculi present.     On my review; large stones bilaterally including in the left renal pelvic without hydronephrosis.   She was previously counseled on options and opted for URS.  She did not want the left side addressed that she wanted to avoid bilateral stents.  See previous note for full details of counseling.       She presented for surgery on 9/17;   Findings:  Normal urethra without strictures. No bladder lesions. Right RPG with large radiopaque stones bilaterally, URS with large ~1.5 cm stone slightly embedded at renal pelvis; lower pole with additional ~1cm stone and large cluster of 2-3mm stones. All stone fragmented/dusted and basket extracted; given extensive stone and poor visualization at conclusion plan for second look to clear out all stone; suspect  majority of stone treated.   6x24 JJ stent placed without issue    Second look URS on 10/3;     Normal urethra. No bladder lesions. Stent not encrusted. Right URS with several stone fragments in lower and upper pole; largest remaining fragment ~6mm. Fragments all dusted and basket extracted. No large remaining stones. 6x24 JJ stent placed without issue.      Stone: COM 40%, COD 60%    She now presents for stent removal.    She has been tolerating the stent without issues.    PROCEDURE:       1. Flexible cystoscopy, removal of RIGHT ureteral stent (34104)    DATE OF PROCEDURE: 10/9/2024     PRE-PROCEDURE DIAGNOSIS: Nephrolithiasis    POST-PROCEDURE DIAGNOSIS: Same     SURGEON: Joselo Dunn MD    FINDINGS:  Stent successfully removed in its entirety.     PROCEDURE:   Patient was brought to the procedure suite and a time-out was performed identifiying the patient,  and procedure to be performed. The risks and benefits of the procedure were once again discussed with the patient including bleeding, infection, and dysuria. The patient agreed to proceed. The patient did not have any signs or symptoms of active UTI. Prophylactic PO antibiotics were given in clinic.    The patient was placed in supine position on the table and the genital area was prepped and draped in the standard sterile fashion. Urojet was used prior for local anesthesia effect. A flexible cystoscope was inserted per urethra. There were no urethral strictures present. The bladder was entered and the distal coil of the stent was seen protruding from the ureteral orifice. The stent was grasped with the flexible stent grasper, and then the stent and cystoscope were both removed. The stent was visualized outside the body and confirmed to be intact and fully removed.     There were no complications and the patient tolerated the procedure well.    IMPRESSION:    Successful stent removal after ureteroscopy today.  Patient with additional large left-sided  kidney stone burden.  We discussed options for the stones including PCNL versus ureteroscopy.  She would like to proceed with ureteroscopy as she tolerated this procedure and the stent well.  She would like to proceed with this before the end of the year.  We will arrange for this.  Given the amount of stone, we will book patient for 2 ureteroscopy.  Will cancel second surgery if able to clear in 1.     The risks discussed included, but were not limited to, urinary tract infection, sepsis, bleeding, injury to urethra/bladder/ureter, urethra stricture, ureteral stricture, inability to treat stone.    Proceed with metabolic workup after stones are addressed.    I discussed general fluid and dietary guidelines to help prevent further stone formation including:    - Fluid consumption of preferably water to make 2-2.5  L/day of urine  - Low sodium consumption  - Adequate calcium consumption (approximately 1200 mg/day)  - Low fat and moderate animal protein consumption  - Limit consumption  of oxalate rich foods.      - I encouraged increased dietary intake of citrate with lemon juice (4 oz day)       In total, 30 minutes were spent on this patient encounter (including chart review, patient history, physical, and counseling, documentation, and communication)- specifically in discussing surgery for her left sided kidney stones           Joselo Dunn MD  Staff Urologist  [facility]  Office: [phone]

## 2024-10-10 NOTE — TELEPHONE ENCOUNTER
Hi,    Can you please schedule this patient for surgery.  This will be 2 surgeries  by 2 or 3 weeks.        Thanks,  Joselo       Urology Surgery Request  Surgeon: Joselo Dunn  Location (if known): [facility]  Procedure: Cystoscopy, LEFT ureteroscopy, laser lithotripsy, stent placement  Anesthesia: General   Time Frame: Next available  Time required: 60 minutes  Diagnosis: Nephrolithiasis  Special Equipment: C-arm    Antibiotics: per hospital protocol unless checked below   ___ Levaquin 500 mg IV   ___ Gemcitabine 2 g/100 mL NS bladder instillation to be given in OR        Urology Surgery Request  Surgeon: Joselo Dunn  Location (if known): [facility]  Procedure: Cystoscopy, LEFT ureteroscopy, laser lithotripsy, stent exchange  Anesthesia: General   Time Frame: Next available (2-3 weeks after the above)  Time required: 60 minutes  Diagnosis: Nephrolithiasis  Special Equipment: C-arm    Antibiotics: per hospital protocol unless checked below   ___ Levaquin 500 mg IV   ___ Gemcitabine 2 g/100 mL NS bladder instillation to be given in OR          Estimated Post Op/Follow Up Appt:   1 week for cystoscopy/stent removal in clinic with me. Please schedule appointment at time of surgery scheduling.

## 2024-10-15 LAB
CAOX DIHYDRATE: 80 %
CAOX MONOHYDRATE: 15 %
HYDROXYAPATITE: 5 %
WEIGHT-STONE: 96 MG

## 2024-10-21 RX ORDER — ATORVASTATIN CALCIUM 10 MG/1
10 TABLET, FILM COATED ORAL NIGHTLY
Qty: 30 TABLET | Refills: 0 | Status: SHIPPED | OUTPATIENT
Start: 2024-10-21 | End: 2024-11-25

## 2024-10-21 RX ORDER — ATORVASTATIN CALCIUM 10 MG/1
10 TABLET, FILM COATED ORAL NIGHTLY
Qty: 90 TABLET | Refills: 0 | OUTPATIENT
Start: 2024-10-21

## 2024-10-21 NOTE — TELEPHONE ENCOUNTER
Scheduled   Future Appointments   Date Time Provider Department Center   10/22/2024  9:00 AM OS DEXA RM1 OS DEXA [place]   10/22/2024  9:30 AM REF [facility] REF OS Ref [place]   11/1/2024  9:15 AM YK CARD ECHO RM 1 YK CARD [place]   1/2/2025  2:20 PM Oliver Pierre MD ENINAPER [facility] [place]

## 2024-10-22 ENCOUNTER — LABORATORY ENCOUNTER (OUTPATIENT)
Dept: LAB | Age: 64
End: 2024-10-22
Attending: UROLOGY
Payer: MEDICARE

## 2024-10-22 ENCOUNTER — HOSPITAL ENCOUNTER (OUTPATIENT)
Dept: BONE DENSITY | Age: 64
Discharge: HOME | End: 2024-10-22
Attending: NURSE PRACTITIONER
Payer: MEDICARE

## 2024-10-22 DIAGNOSIS — E78.00 ELEVATED CHOLESTEROL: ICD-10-CM

## 2024-10-22 DIAGNOSIS — N20.0 KIDNEY STONE: ICD-10-CM

## 2024-10-22 DIAGNOSIS — Z78.0 MENOPAUSE: ICD-10-CM

## 2024-10-22 DIAGNOSIS — Z13.820 SCREENING FOR OSTEOPOROSIS: ICD-10-CM

## 2024-10-22 LAB
ALBUMIN SERPL-MCNC: 4.6 G/DL (ref 3.2–4.8)
ALP LIVER SERPL-CCNC: 72 U/L
ALT SERPL-CCNC: 15 U/L
AST SERPL-CCNC: 18 U/L (ref ?–34)
BILIRUB DIRECT SERPL-MCNC: 0.2 MG/DL (ref ?–0.3)
BILIRUB SERPL-MCNC: 0.5 MG/DL (ref 0.2–1.1)
CHOLEST SERPL-MCNC: 172 MG/DL (ref ?–200)
CK SERPL-CCNC: 29 U/L
FASTING PATIENT LIPID ANSWER: YES
HDLC SERPL-MCNC: 54 MG/DL (ref 40–59)
LDLC SERPL CALC-MCNC: 92 MG/DL (ref ?–100)
NONHDLC SERPL-MCNC: 118 MG/DL (ref ?–130)
PROT SERPL-MCNC: 7.3 G/DL (ref 5.7–8.2)
TRIGL SERPL-MCNC: 147 MG/DL (ref 30–149)
VLDLC SERPL CALC-MCNC: 24 MG/DL (ref 0–30)

## 2024-10-22 PROCEDURE — 77080 DXA BONE DENSITY AXIAL: CPT | Performed by: NURSE PRACTITIONER

## 2024-10-22 PROCEDURE — 36415 COLL VENOUS BLD VENIPUNCTURE: CPT

## 2024-10-22 PROCEDURE — 80061 LIPID PANEL: CPT

## 2024-10-22 PROCEDURE — 80076 HEPATIC FUNCTION PANEL: CPT

## 2024-10-22 PROCEDURE — 87086 URINE CULTURE/COLONY COUNT: CPT

## 2024-10-22 PROCEDURE — 82550 ASSAY OF CK (CPK): CPT

## 2024-10-23 ENCOUNTER — TELEPHONE (OUTPATIENT)
Dept: FAMILY MEDICINE CLINIC | Facility: CLINIC | Age: 64
End: 2024-10-23

## 2024-10-23 NOTE — TELEPHONE ENCOUNTER
----- Message from Adriana Peng sent at 10/23/2024  2:12 PM CDT -----  Results reviewed.   No UTI

## 2024-10-23 NOTE — TELEPHONE ENCOUNTER
----- Message from Adriana Peng sent at 10/23/2024  7:14 AM CDT -----  Results reviewed.   Normal bone density

## 2024-10-23 NOTE — TELEPHONE ENCOUNTER
----- Message from Adriana Peng sent at 10/23/2024  7:15 AM CDT -----  Results reviewed.   Ck normal  Cholesterol improved  Normal liver enzymes    Please forward to her cardiologist

## 2024-11-01 ENCOUNTER — HOSPITAL ENCOUNTER (OUTPATIENT)
Dept: CV DIAGNOSTICS | Age: 64
Discharge: HOME | End: 2024-11-01
Attending: NURSE PRACTITIONER
Payer: MEDICARE

## 2024-11-01 ENCOUNTER — TELEPHONE (OUTPATIENT)
Dept: FAMILY MEDICINE CLINIC | Facility: CLINIC | Age: 64
End: 2024-11-01

## 2024-11-01 DIAGNOSIS — R53.83 OTHER FATIGUE: ICD-10-CM

## 2024-11-01 DIAGNOSIS — R06.02 SOB (SHORTNESS OF BREATH) ON EXERTION: ICD-10-CM

## 2024-11-01 PROCEDURE — 93306 TTE W/DOPPLER COMPLETE: CPT | Performed by: NURSE PRACTITIONER

## 2024-11-01 NOTE — TELEPHONE ENCOUNTER
----- Message from Adriana Peng sent at 11/1/2024 12:43 PM CDT -----  Results reviewed.   Normal echo

## 2024-11-10 ENCOUNTER — ANESTHESIA EVENT (OUTPATIENT)
Dept: SURGERY | Facility: HOSPITAL | Age: 64
End: 2024-11-10
Payer: MEDICARE

## 2024-11-10 NOTE — H&P
UROLOGY PRE-OPERATIVE HISTORY & PHYSICAL      Юлия Charles Patient Status:  Hospital Outpatient Surgery    1960 MRN [id number]   Location [facility] SURGERY Attending Joselo Dunn MD   Hosp Day # 0 PCP Patrizia Louise MD     Primary Care Provider: Patrizia Louise MD     Procedure      CYSTOSCOPY, LEFT URETEROSCOPY, LASER LITHOTRIPSY, STENT PLACEMENT:     History of Present Illness:          Patient is a(n) 64 year old female with hx of right breast cx s/p b/l mastectomy in . COPD, HLD, intracranial tumor s/p  shunt who presents for kidney stones fu sp recent RIGHT URS; now here for L URS      Pt recently had KUB done for constipation on 24. Right kidney: 1.3 x 1.7 cm right renal pelvis stone, numerous smaller stones in the mid lower pole of the right kidney. Left kidney: 13 x 7 mm mid lower pole, 9 x 12 mm lower pole, 6 x 4 mm midpole,  2 mm possible distal ureteral stone vs. Phlebolith.   She then saw our team for the above. Had very mild RLQ pain that began a wk ago. No urinary complaints. Had urosepsis in  that required hospitalization but was never told she had stones. At the time did not have any urinary complaints. Otherwise, UTIs infrequently.   Drinks 64oz+ water daily, used to have high salt diet but has since cut down. Unsure if fhx of stones. Never passed stone before.       A CT scan was obtained;   -Moderate right-sided hydronephrosis with a 1.6 x 1.1 cm calculus within the right renal pelvis.  No ureteral calculi are noted.  There are nonobstructing bilateral renal calculi present.     On my review; large stones bilaterally including in the left renal pelvic without hydronephrosis.   She was previously counseled on options and opted for URS.  She did not want the left side addressed that she wanted to avoid bilateral stents.  See previous note for full details of counseling.     She presented for surgery on ;   Findings:  Normal urethra without strictures. No bladder  lesions. Right RPG with large radiopaque stones bilaterally, URS with large ~1.5 cm stone slightly embedded at renal pelvis; lower pole with additional ~1cm stone and large cluster of 2-3mm stones. All stone fragmented/dusted and basket extracted; given extensive stone and poor visualization at conclusion plan for second look to clear out all stone; suspect majority of stone treated.   6x24 JJ stent placed without issue    Second look URS on 10/3;      Normal urethra. No bladder lesions. Stent not encrusted. Right URS with several stone fragments in lower and upper pole; largest remaining fragment ~6mm. Fragments all dusted and basket extracted. No large remaining stones. 6x24 JJ stent placed without issue.      Stone: COM 40%, COD 60%     Stent removed on 10/10; tolerated without issue.   Was counseled on options for left side and opted for URS. She understands this may require 2 surgeries; is booked for second look URS   On 12/3. She again wanted to avoid PCNL.   The risks discussed included, but were not limited to, urinary tract infection, sepsis, bleeding, injury to urethra/bladder/ureter, urethra stricture, ureteral stricture, inability to treat stone. Patient agreeable to proceed and informed consent obtained.      Ucx: Neg, hx of enterococcus; Levaquin for OR        Stent size 6x24  5' 2\" (1.575 m)    History:     Past Medical History:    Arthritis    Bloating    Blood in urine    Breast cancer, right breast (HCC)    Formatting of this note might be different from the original.  S/p b/l mastectomy  In 2008. On Aromacin      Calculus of kidney    Change in hair    Constipation    COPD (chronic obstructive pulmonary disease) (HCC)    No oxygen    Diarrhea, unspecified    Fatigue    Frequent urination    High cholesterol    History of blood transfusion    about 20 years ago    Intracranial tumor (HCC)    Irregular bowel habits    Pain in joints    Personal history of antineoplastic chemotherapy    Last  treatment 2014    Stool incontinence    Stress    Visual impairment    glasses       Past Surgical History:   Procedure Laterality Date    Brain surgery  2007    removal brain tumor          x2    Hysterectomy      Removal of kidney stone      Removal of lung,lobectomy      Tonsillectomy      Wom w/ cydney mastec or hx of mastec         Family History   Problem Relation Age of Onset    Heart Disease Father     Substance Abuse Son     Diabetes Sister     Heart Disease Sister        Social History     Socioeconomic History    Marital status:    Tobacco Use    Smoking status: Former     Current packs/day: 0.00     Types: Cigarettes     Quit date: 2018     Years since quittin.1    Smokeless tobacco: Never   Vaping Use    Vaping status: Never Used   Substance and Sexual Activity    Alcohol use: Not Currently    Drug use: Not Currently     Types: Cocaine     Comment: long time ago   Other Topics Concern    Caffeine Concern Yes     Comment: coffee occ, pepsi    Exercise Yes     Comment: walking     Social Drivers of Health     Financial Resource Strain: Patient Declined (2024)    Received from [facility]    Overall Financial Resource Strain (CARDIA)     Difficulty of Paying Living Expenses: Patient declined   Food Insecurity: Patient Declined (2024)    Received from [facility] and [facility]    Hunger Vital Sign     Worried About Running Out of Food in the Last Year: Patient declined     Ran Out of Food in the Last Year: Patient declined   Transportation Needs: Patient Declined (2024)    Received from [facility] and [facility]    PRAPARE - Transportation     Lack of Transportation (Medical): Patient declined     Lack of Transportation (Non-Medical): Patient declined   Physical Activity: Patient Declined (2024)    Received from [facility]    Exercise Vital Sign     Days of  Exercise per Week: Patient declined     Minutes of Exercise per Session: Patient declined   Stress: Patient Declined (8/25/2024)    Received from [facility]    [place] [facility] [employer] - Occupational Stress Questionnaire     Feeling of Stress : Patient declined   Social Connections: Patient Declined (8/25/2024)    Received from [facility]    Social Connection and Isolation Panel [NHANES]     Frequency of Communication with Friends and Family: Patient declined     Frequency of Social Gatherings with Friends and Family: Patient declined     Attends Judaism Services: Patient declined     Active Member of Clubs or Organizations: Patient declined     Attends Club or Organization Meetings: Patient declined     Marital Status: Patient declined   Housing Stability: Patient Declined (8/25/2024)    Received from [facility]    Housing Stability Vital Sign     Unable to Pay for Housing in the Last Year: Patient declined     Number of Times Moved in the Last Year: 0     Homeless in the Last Year: Patient declined       Medications:  Current Outpatient Medications   Medication Sig Dispense Refill    atorvastatin 10 MG Oral Tab TAKE 1 TABLET(10 MG) BY MOUTH EVERY NIGHT 30 tablet 0    Polyethylene Glycol 3350 (MIRALAX) 17 g Oral Powd Pack Take 17 g by mouth daily as needed (Take to avoid constipation, especially if taking narcotic pain medications.). 20 packet 1    Wheat Dextrin (BENEFIBER OR) Take by mouth as needed.      acetaminophen 325 MG Oral Tab Take 1 tablet (325 mg total) by mouth every 6 (six) hours as needed for Pain.      albuterol (PROAIR HFA) 108 (90 Base) MCG/ACT Inhalation Aero Soln Inhale 2 puffs into the lungs every 4 (four) hours as needed for Wheezing or Shortness of Breath. 1 each 1       Allergies:  Allergies[1]    Review of Systems:   A comprehensive 10-point review of systems was  completed.  Pertinent positives and negatives are noted in the the HPI.    Physical Exam:   Vital Signs:  Height 5' 2\" (1.575 m), weight 148 lb (67.1 kg).     CONSTITUTIONAL: Well developed, well nourished, in no acute distress   RESPIRATORY: Normal respiratory effort  ABDOMEN: Soft, non-tender, non-distended      Laboratory Data:  Lab Results   Component Value Date    WBC 5.2 07/16/2024    HGB 13.9 07/16/2024    .0 07/16/2024     Lab Results   Component Value Date     07/16/2024    K 3.8 07/16/2024     07/16/2024    CO2 29.0 07/16/2024    BUN 14 07/16/2024    GLU 94 07/16/2024    GFRAA 111 09/13/2021    AST 18 10/22/2024    ALT 15 10/22/2024    TP 7.3 10/22/2024    ALB 4.6 10/22/2024    CA 9.6 07/16/2024       Urinalysis Results (last three years):  Recent Labs     09/11/24  1415 10/10/24  1059   SPECGRAVITY 1.015 1.015   PHURINE 6.0 7.0   NITRITE Negative Negative       Urine Culture Results (last three years):  Lab Results   Component Value Date    URINECUL No Growth 2 Days 10/22/2024    URINECUL 50,000-99,000 CFU/ML Enterococcus faecalis NOT VRE (A) 09/23/2024    URINECUL No Growth 2 Days 09/11/2024       PSA:  No results found for: \"PSA\", \"PERCENTPSA\", \"PSAS\", \"PSAULTRA\"     Imaging (last three days):  No results found.     Assessment:   Юлия Charles is a 64 year old y/o female who presents for the above stated procedure.       Plan:     - OR for CYSTOSCOPY, LEFT URETEROSCOPY, LASER LITHOTRIPSY, STENT PLACEMENT:   - NPO since midnight   - Antibiotics ordered for OR   - Informed consent obtained - risks and benefits explained, and all questions answered  - Marked appropriately     I have personally reviewed all relevant medical records, labs, and imaging.       Joselo Dunn MD  Staff Urologist  [facility]  Office: [phone]         [1]   Allergies  Allergen Reactions    Amoxicillin NAUSEA ONLY     \"At least 20 years ago\"    Tamoxifen OTHER (SEE COMMENTS)     Very  emotional

## 2024-11-11 ENCOUNTER — HOSPITAL ENCOUNTER (OUTPATIENT)
Facility: HOSPITAL | Age: 64
Setting detail: HOSPITAL OUTPATIENT SURGERY
Discharge: HOME | End: 2024-11-11
Attending: UROLOGY | Admitting: UROLOGY
Payer: MEDICARE

## 2024-11-11 ENCOUNTER — ANESTHESIA (OUTPATIENT)
Dept: SURGERY | Facility: HOSPITAL | Age: 64
End: 2024-11-11
Payer: MEDICARE

## 2024-11-11 ENCOUNTER — APPOINTMENT (OUTPATIENT)
Dept: GENERAL RADIOLOGY | Facility: HOSPITAL | Age: 64
End: 2024-11-11
Attending: UROLOGY
Payer: MEDICARE

## 2024-11-11 VITALS
RESPIRATION RATE: 18 BRPM | SYSTOLIC BLOOD PRESSURE: 131 MMHG | WEIGHT: 147.38 LBS | DIASTOLIC BLOOD PRESSURE: 94 MMHG | HEIGHT: 62 IN | OXYGEN SATURATION: 97 % | TEMPERATURE: 98 F | BODY MASS INDEX: 27.12 KG/M2 | HEART RATE: 80 BPM

## 2024-11-11 DIAGNOSIS — N20.0 NEPHROLITHIASIS: ICD-10-CM

## 2024-11-11 PROCEDURE — 0T778DZ DILATION OF LEFT URETER WITH INTRALUMINAL DEVICE, VIA NATURAL OR ARTIFICIAL OPENING ENDOSCOPIC: ICD-10-PCS | Performed by: UROLOGY

## 2024-11-11 PROCEDURE — 0TC48ZZ EXTIRPATION OF MATTER FROM LEFT KIDNEY PELVIS, VIA NATURAL OR ARTIFICIAL OPENING ENDOSCOPIC: ICD-10-PCS | Performed by: UROLOGY

## 2024-11-11 PROCEDURE — 74420 UROGRAPHY RTRGR +-KUB: CPT | Performed by: UROLOGY

## 2024-11-11 PROCEDURE — 52356 CYSTO/URETERO W/LITHOTRIPSY: CPT | Performed by: UROLOGY

## 2024-11-11 PROCEDURE — BT1F1ZZ FLUOROSCOPY OF LEFT KIDNEY, URETER AND BLADDER USING LOW OSMOLAR CONTRAST: ICD-10-PCS | Performed by: UROLOGY

## 2024-11-11 DEVICE — URETERAL STENT
Type: IMPLANTABLE DEVICE | Site: URETER | Status: FUNCTIONAL
Brand: ASCERTA™

## 2024-11-11 RX ORDER — LABETALOL HYDROCHLORIDE 5 MG/ML
5 INJECTION, SOLUTION INTRAVENOUS EVERY 5 MIN PRN
Status: DISCONTINUED | OUTPATIENT
Start: 2024-11-11 | End: 2024-11-11

## 2024-11-11 RX ORDER — SCOLOPAMINE TRANSDERMAL SYSTEM 1 MG/1
1 PATCH, EXTENDED RELEASE TRANSDERMAL ONCE
Status: DISCONTINUED | OUTPATIENT
Start: 2024-11-11 | End: 2024-11-11 | Stop reason: HOSPADM

## 2024-11-11 RX ORDER — ALBUTEROL SULFATE 0.83 MG/ML
2.5 SOLUTION RESPIRATORY (INHALATION) AS NEEDED
Status: DISCONTINUED | OUTPATIENT
Start: 2024-11-11 | End: 2024-11-11

## 2024-11-11 RX ORDER — OXYBUTYNIN CHLORIDE 5 MG/1
5 TABLET ORAL 3 TIMES DAILY PRN
Qty: 15 TABLET | Refills: 0 | Status: SHIPPED | OUTPATIENT
Start: 2024-11-11

## 2024-11-11 RX ORDER — HYDROMORPHONE HYDROCHLORIDE 1 MG/ML
0.2 INJECTION, SOLUTION INTRAMUSCULAR; INTRAVENOUS; SUBCUTANEOUS EVERY 5 MIN PRN
Status: DISCONTINUED | OUTPATIENT
Start: 2024-11-11 | End: 2024-11-11

## 2024-11-11 RX ORDER — SODIUM CHLORIDE, SODIUM LACTATE, POTASSIUM CHLORIDE, CALCIUM CHLORIDE 600; 310; 30; 20 MG/100ML; MG/100ML; MG/100ML; MG/100ML
INJECTION, SOLUTION INTRAVENOUS CONTINUOUS
Status: DISCONTINUED | OUTPATIENT
Start: 2024-11-11 | End: 2024-11-11

## 2024-11-11 RX ORDER — LEVOFLOXACIN 500 MG/1
500 TABLET, FILM COATED ORAL DAILY
Qty: 3 TABLET | Refills: 0 | Status: SHIPPED | OUTPATIENT
Start: 2024-11-11 | End: 2024-11-14

## 2024-11-11 RX ORDER — TAMSULOSIN HYDROCHLORIDE 0.4 MG/1
0.4 CAPSULE ORAL EVERY EVENING
Qty: 14 CAPSULE | Refills: 0 | Status: SHIPPED | OUTPATIENT
Start: 2024-11-11 | End: 2024-11-25

## 2024-11-11 RX ORDER — HYDROCODONE BITARTRATE AND ACETAMINOPHEN 5; 325 MG/1; MG/1
1 TABLET ORAL ONCE AS NEEDED
Status: DISCONTINUED | OUTPATIENT
Start: 2024-11-11 | End: 2024-11-11

## 2024-11-11 RX ORDER — LIDOCAINE HYDROCHLORIDE 10 MG/ML
INJECTION, SOLUTION EPIDURAL; INFILTRATION; INTRACAUDAL; PERINEURAL AS NEEDED
Status: DISCONTINUED | OUTPATIENT
Start: 2024-11-11 | End: 2024-11-11 | Stop reason: SURG

## 2024-11-11 RX ORDER — MIDAZOLAM HYDROCHLORIDE 1 MG/ML
INJECTION INTRAMUSCULAR; INTRAVENOUS AS NEEDED
Status: DISCONTINUED | OUTPATIENT
Start: 2024-11-11 | End: 2024-11-11 | Stop reason: SURG

## 2024-11-11 RX ORDER — KETOROLAC TROMETHAMINE 30 MG/ML
INJECTION, SOLUTION INTRAMUSCULAR; INTRAVENOUS AS NEEDED
Status: DISCONTINUED | OUTPATIENT
Start: 2024-11-11 | End: 2024-11-11 | Stop reason: SURG

## 2024-11-11 RX ORDER — HYDROMORPHONE HYDROCHLORIDE 1 MG/ML
0.6 INJECTION, SOLUTION INTRAMUSCULAR; INTRAVENOUS; SUBCUTANEOUS EVERY 5 MIN PRN
Status: DISCONTINUED | OUTPATIENT
Start: 2024-11-11 | End: 2024-11-11

## 2024-11-11 RX ORDER — HYDROCODONE BITARTRATE AND ACETAMINOPHEN 5; 325 MG/1; MG/1
2 TABLET ORAL ONCE AS NEEDED
Status: DISCONTINUED | OUTPATIENT
Start: 2024-11-11 | End: 2024-11-11

## 2024-11-11 RX ORDER — ONDANSETRON 2 MG/ML
INJECTION INTRAMUSCULAR; INTRAVENOUS AS NEEDED
Status: DISCONTINUED | OUTPATIENT
Start: 2024-11-11 | End: 2024-11-11 | Stop reason: SURG

## 2024-11-11 RX ORDER — LEVOFLOXACIN 5 MG/ML
500 INJECTION, SOLUTION INTRAVENOUS
Status: COMPLETED | OUTPATIENT
Start: 2024-11-11 | End: 2024-11-11

## 2024-11-11 RX ORDER — HYDROMORPHONE HYDROCHLORIDE 1 MG/ML
0.4 INJECTION, SOLUTION INTRAMUSCULAR; INTRAVENOUS; SUBCUTANEOUS EVERY 5 MIN PRN
Status: DISCONTINUED | OUTPATIENT
Start: 2024-11-11 | End: 2024-11-11

## 2024-11-11 RX ORDER — DEXAMETHASONE SODIUM PHOSPHATE 4 MG/ML
VIAL (ML) INJECTION AS NEEDED
Status: DISCONTINUED | OUTPATIENT
Start: 2024-11-11 | End: 2024-11-11 | Stop reason: SURG

## 2024-11-11 RX ORDER — METOCLOPRAMIDE HYDROCHLORIDE 5 MG/ML
INJECTION INTRAMUSCULAR; INTRAVENOUS AS NEEDED
Status: DISCONTINUED | OUTPATIENT
Start: 2024-11-11 | End: 2024-11-11 | Stop reason: SURG

## 2024-11-11 RX ORDER — MIDAZOLAM HYDROCHLORIDE 1 MG/ML
1 INJECTION INTRAMUSCULAR; INTRAVENOUS EVERY 5 MIN PRN
Status: DISCONTINUED | OUTPATIENT
Start: 2024-11-11 | End: 2024-11-11

## 2024-11-11 RX ORDER — NALOXONE HYDROCHLORIDE 0.4 MG/ML
80 INJECTION, SOLUTION INTRAMUSCULAR; INTRAVENOUS; SUBCUTANEOUS AS NEEDED
Status: DISCONTINUED | OUTPATIENT
Start: 2024-11-11 | End: 2024-11-11

## 2024-11-11 RX ORDER — PHENAZOPYRIDINE HYDROCHLORIDE 100 MG/1
100 TABLET, FILM COATED ORAL 3 TIMES DAILY PRN
Qty: 10 TABLET | Refills: 0 | Status: SHIPPED | OUTPATIENT
Start: 2024-11-11

## 2024-11-11 RX ORDER — ACETAMINOPHEN 500 MG
1000 TABLET ORAL ONCE AS NEEDED
Status: DISCONTINUED | OUTPATIENT
Start: 2024-11-11 | End: 2024-11-11

## 2024-11-11 RX ORDER — ACETAMINOPHEN 500 MG
1000 TABLET ORAL ONCE
Status: DISCONTINUED | OUTPATIENT
Start: 2024-11-11 | End: 2024-11-11 | Stop reason: HOSPADM

## 2024-11-11 RX ORDER — PHENYLEPHRINE HCL 10 MG/ML
VIAL (ML) INJECTION AS NEEDED
Status: DISCONTINUED | OUTPATIENT
Start: 2024-11-11 | End: 2024-11-11 | Stop reason: SURG

## 2024-11-11 RX ADMIN — PHENYLEPHRINE HCL 100 MCG: 10 MG/ML VIAL (ML) INJECTION at 10:23:00

## 2024-11-11 RX ADMIN — METOCLOPRAMIDE HYDROCHLORIDE 10 MG: 5 INJECTION INTRAMUSCULAR; INTRAVENOUS at 10:23:00

## 2024-11-11 RX ADMIN — MIDAZOLAM HYDROCHLORIDE 2 MG: 1 INJECTION INTRAMUSCULAR; INTRAVENOUS at 10:18:00

## 2024-11-11 RX ADMIN — LEVOFLOXACIN 500 MG: 5 INJECTION, SOLUTION INTRAVENOUS at 10:23:00

## 2024-11-11 RX ADMIN — SODIUM CHLORIDE, SODIUM LACTATE, POTASSIUM CHLORIDE, CALCIUM CHLORIDE: 600; 310; 30; 20 INJECTION, SOLUTION INTRAVENOUS at 10:18:00

## 2024-11-11 RX ADMIN — KETOROLAC TROMETHAMINE 30 MG: 30 INJECTION, SOLUTION INTRAMUSCULAR; INTRAVENOUS at 11:30:00

## 2024-11-11 RX ADMIN — LIDOCAINE HYDROCHLORIDE 50 MG: 10 INJECTION, SOLUTION EPIDURAL; INFILTRATION; INTRACAUDAL; PERINEURAL at 10:23:00

## 2024-11-11 RX ADMIN — DEXAMETHASONE SODIUM PHOSPHATE 4 MG: 4 MG/ML VIAL (ML) INJECTION at 10:23:00

## 2024-11-11 RX ADMIN — ONDANSETRON 4 MG: 2 INJECTION INTRAMUSCULAR; INTRAVENOUS at 11:30:00

## 2024-11-11 NOTE — ANESTHESIA POSTPROCEDURE EVALUATION
[facility]    Юлия Charles Patient Status:  Hospital Outpatient Surgery   Age/Gender 64 year old female MRN [id number]   Location [facility] SURGERY Attending Joselo Dunn MD   Hosp Day # 0 PCP Patrizia Louise MD       Anesthesia Post-op Note    CYSTOSCOPY, LEFT URETEROSCOPY, LASER LITHOTRIPSY, STENT PLACEMENT    Procedure Summary       Date: 11/11/24 Room / Location:  MAIN OR 07 /  MAIN OR    Anesthesia Start: 1018 Anesthesia Stop: 1150    Procedure: CYSTOSCOPY, LEFT URETEROSCOPY, LASER LITHOTRIPSY, STENT PLACEMENT (Left: Bladder) Diagnosis:       Nephrolithiasis      (Nephrolithiasis [N20.0])    Surgeons: Joselo Dunn MD Anesthesiologist: Caleb James MD    Anesthesia Type: general ASA Status: 2            Anesthesia Type: general    Vitals Value Taken Time   /86 11/11/24 1152   Temp 97.5 °F (36.4 °C) 11/11/24 1152   Pulse 80 11/11/24 1152   Resp 18 11/11/24 1152   SpO2 100 % 11/11/24 1152   Vitals shown include unfiled device data.    Patient Location: PACU    Anesthesia Type: general    Airway Patency: patent and extubated    Postop Pain Control: adequate    Mental Status: mildly sedated but able to meaningfully participate in the post-anesthesia evaluation    Nausea/Vomiting: none    Cardiopulmonary/Hydration status: stable euvolemic    Complications: no apparent anesthesia related complications    Postop vital signs: stable    Dental Exam: Unchanged from Preop    Patient to be discharged from PACU when criteria met.

## 2024-11-11 NOTE — OPERATIVE REPORT
Urology Operative Note    Attending Surgeon: Joselo Dunn MD    Assistant Surgeon: None    Patient Name: Юлия Charles    Date of Surgery: 11/11/2024    Preoperative Diagnosis: LEFT nephrolithiasis    Postoperative Diagnosis: Same    Procedure Performed:   Cystoscopy, LEFT ureteroscopy, laser lithotripsy, basket stone extraction, retrograde pyelogram, ureteral stent placement    Indication:    Patient is a(n) 64 year old female with hx of right breast cx s/p b/l mastectomy in 2008. COPD, HLD, intracranial tumor s/p  shunt who presents for kidney stones fu sp recent RIGHT URS; now here for L URS      Pt recently had KUB done for constipation on 9/5/24. Right kidney: 1.3 x 1.7 cm right renal pelvis stone, numerous smaller stones in the mid lower pole of the right kidney. Left kidney: 13 x 7 mm mid lower pole, 9 x 12 mm lower pole, 6 x 4 mm midpole,  2 mm possible distal ureteral stone vs. Phlebolith.   She then saw our team for the above. Had very mild RLQ pain that began a wk ago. No urinary complaints. Had urosepsis in 2020 that required hospitalization but was never told she had stones. At the time did not have any urinary complaints. Otherwise, UTIs infrequently.   Drinks 64oz+ water daily, used to have high salt diet but has since cut down. Unsure if fhx of stones. Never passed stone before.       A CT scan was obtained;   -Moderate right-sided hydronephrosis with a 1.6 x 1.1 cm calculus within the right renal pelvis.  No ureteral calculi are noted.  There are nonobstructing bilateral renal calculi present.     On my review; large stones bilaterally including in the left renal pelvic without hydronephrosis.   She was previously counseled on options and opted for URS.  She did not want the left side addressed that she wanted to avoid bilateral stents.  See previous note for full details of counseling.     She presented for surgery on 9/17;   Findings:  Normal urethra without strictures. No bladder lesions.  Right RPG with large radiopaque stones bilaterally, URS with large ~1.5 cm stone slightly embedded at renal pelvis; lower pole with additional ~1cm stone and large cluster of 2-3mm stones. All stone fragmented/dusted and basket extracted; given extensive stone and poor visualization at conclusion plan for second look to clear out all stone; suspect majority of stone treated.   6x24 JJ stent placed without issue     Second look URS on 10/3;      Normal urethra. No bladder lesions. Stent not encrusted. Right URS with several stone fragments in lower and upper pole; largest remaining fragment ~6mm. Fragments all dusted and basket extracted. No large remaining stones. 6x24 JJ stent placed without issue.      Stone: COM 40%, COD 60%     Stent removed on 10/10; tolerated without issue.   Was counseled on options for left side and opted for URS. She understands this may require 2 surgeries; is booked for second look URS   On 12/3. She again wanted to avoid PCNL.   The risks discussed included, but were not limited to, urinary tract infection, sepsis, bleeding, injury to urethra/bladder/ureter, urethra stricture, ureteral stricture, inability to treat stone. Patient agreeable to proceed and informed consent obtained.       Findings:  Normal urethra. No bladder lesions. Left RPG with large lower pole filling defect; radiopaque stone.   Left URS with ~6mm stone in renal pelvis; fully treated. Lower pole with very large >1cm branching stone filling most of calyx- able to fragment this stone but unable to fully treat given angle into lower pole and some slight narrowing at the proximal ureteral which limited mobility. I was unable to reposition stone despite use of basket; tilting patient. Large stone burden remaining on final imaging.    6x24 JJ stent placed without issue.        Procedure:  The patient was taken to the operating room and a timeout was performed confirming the correct patient and procedure. The patient was  prepped and draped in lithotomy position after undergoing general anesthesia. Pre-operative prophylactic antibiotics were given in the form of Levaquin.    The cystoscope was inserted per urethra and the bladder was inspected and drained. The LEFT ureter was cannulated with a Tigertail catheter and advanced to the mid- ureter. A retrograde pyelogram was obtained which demonstrated large lower pole filling defect and radiopaque stone; no hydronephrosis. A Sensor wire was then passed through the catheter and into the renal pelvis which I confirmed using fluoroscopy. The catheter was removed.      Then, an 8x10fr dilator was used to dilate the distal ureter under fluoroscopy over the Sensor wire.  I then placed a second Sensor wire through the dilator and into the renal pelvis under fluoroscopy.   Once both wires were confirmed to be in the kidney I removed the dilator. One wire was then secured to the drapes as a safety wire while the other was our working wire.      Over our working wire we gently introduced a 11/13F x 28cm ureteral access sheath under fluoroscopic visualization to the level of the proximal ureter. This passed easily. Once that was done, we then removed the inner sheath and wire.  The flexible ureteroscope was advanced into the sheath and up into the left renal pelvis.  A systematic inspection of the kidney revealed: ~6mm stone in the renal pelvis and a very large >1cm branching stone filling most of calyx. Renal pelvis stone was fragmented using a laser and the fragments were extracted using a zero tip basket.   The lower pole stone was able to fragmented partially but I was unable to reach the remainder of the stone due to the angle of the lower pole and some narrowing at the proximal ureter that limited mobility. I attempted to reposition the stone using a basket and slightly tilted the patient but was unable to reach the stone. After several attempts to treat stone decision made to place stent.  The ureteroscope and access sheath were removed and I surveyed the ureter on the way out which was healthy appearing.     We then placed a 6x24  double-J ureteral stent over our safety wire under direct cystoscopic and fluoroscopic guidance. The proximal and distal curls formed appropriately. Stent left without strings. The bladder was then drained and the cystoscope was removed. The procedure was then terminated.       The patient was awoken from anesthesia and transferred to PACU in stable condition. The patient tolerated the procedure well. All instrument/supply counts were correct at the end of the case.    Specimens:   Stone fragments for chemical analysis    Estimated Blood Loss:  Minimal    Tubes/Drains:  6-Chinese x 24 cm JJ LEFT ureteral stent    Complications:   None immediate    Condition from OR:  Stable    Plan:   Abx x3 days  Discuss second stage URS as scheduled vs PCNL with patient        Joselo Dunn MD  Staff Urologist  [facility]  Office: [phone]

## 2024-11-11 NOTE — ANESTHESIA PROCEDURE NOTES
Airway  Date/Time: 11/11/2024 10:24 AM  Urgency: elective      General Information and Staff    Patient location during procedure: OR  Anesthesiologist: Caleb aJmes MD  Performed: anesthesiologist   Performed by: Caleb James MD  Authorized by: Caleb James MD      Indications and Patient Condition  Indications for airway management: anesthesia  Sedation level: deep  Preoxygenated: yes  Patient position: sniffing  Mask difficulty assessment: 1 - vent by mask    Final Airway Details  Final airway type: supraglottic airway      Successful airway: classic  Size 4       Number of attempts at approach: 1  Number of other approaches attempted: 0

## 2024-11-11 NOTE — ANESTHESIA PREPROCEDURE EVALUATION
PRE-OP EVALUATION    Patient Name: Юлия Charles    Admit Diagnosis: Nephrolithiasis [N20.0]    Pre-op Diagnosis: Nephrolithiasis [N20.0]    CYSTOSCOPY, LEFT URETEROSCOPY, LASER LITHOTRIPSY, STENT PLACEMENT    Anesthesia Procedure: CYSTOSCOPY, LEFT URETEROSCOPY, LASER LITHOTRIPSY, STENT PLACEMENT (Left)    Surgeons and Role:     * Joselo Dunn MD - Primary    Pre-op vitals reviewed.  Temp: 97.4 °F (36.3 °C)  Pulse: 61  Resp: 16  BP: 143/75  SpO2: 96 %  Body mass index is 26.96 kg/m².    Current medications reviewed.  Hospital Medications:   [Transfer Hold] acetaminophen (Tylenol Extra Strength) tab 1,000 mg  1,000 mg Oral Once    [Transfer Hold] scopolamine (Transderm-Scop) 1 MG/3DAYS patch 1 patch  1 patch Transdermal Once    lactated ringers infusion   Intravenous Continuous    levoFLOXacin in dextrose 5% (Levaquin) 500 mg/100mL IVPB premix 500 mg  500 mg Intravenous Once (Intra-Op)       Outpatient Medications:   Prescriptions Prior to Admission[1]    Allergies: Amoxicillin and Tamoxifen      Anesthesia Evaluation    Patient summary reviewed.    Anesthetic Complications  (-) history of anesthetic complications         GI/Hepatic/Renal    Negative GI/hepatic/renal ROS.                             Cardiovascular        Exercise tolerance: good     MET: >4         (+) hyperlipidemia                                  Endo/Other    Negative endo/other ROS.                              Pulmonary        (+) COPD                   Neuro/Psych    Negative neuro/psych ROS.                                  Past Surgical History:   Procedure Laterality Date    Brain surgery      removal brain tumor          x2    Hysterectomy      Removal of kidney stone      Removal of lung,lobectomy      Tonsillectomy      Wom w/ cydney mastec or hx of mastec       Social History     Socioeconomic History    Marital status:    Tobacco Use    Smoking status: Former     Current packs/day: 0.00     Types: Cigarettes      Quit date: 2018     Years since quittin.1    Smokeless tobacco: Never   Vaping Use    Vaping status: Never Used   Substance and Sexual Activity    Alcohol use: Not Currently    Drug use: Not Currently     Types: Cocaine     Comment: long time ago   Other Topics Concern    Caffeine Concern Yes     Comment: coffee occ, pepsi    Exercise Yes     Comment: walking     History   Drug Use Unknown     Comment: long time ago     Available pre-op labs reviewed.               Airway      Mallampati: II  Mouth opening: >3 FB  TM distance: > 6 cm  Neck ROM: full Cardiovascular    Cardiovascular exam normal.  Rhythm: regular  Rate: normal     Dental    Dentition appears grossly intact         Pulmonary    Pulmonary exam normal.  Breath sounds clear to auscultation bilaterally.               Other findings              ASA: 2   Plan: general  NPO status verified and patient meets guidelines.  Patient has not taken beta blockers in last 24 hours.  Post-procedure pain management plan discussed with surgeon and patient.    Comment: I spoke with the patient and discussed the risks of general anesthesia, which include nausea and vomiting; sore throat; injury to the lips, gums, teeth, and eyes; cardiac, pulmonary, and neurologic events; aspiration; and allergic reactions. The patient understands these risks and consents to receiving general anesthesia for this procedure.  Plan/risks discussed with: patient and sibling                Present on Admission:  **None**             [1]   Facility-Administered Medications Prior to Admission   Medication Dose Route Frequency Provider Last Rate Last Admin    [COMPLETED] sulfamethoxazole-trimethoprim DS (Bactrim DS) 800-160 MG per tab 1 tablet  1 tablet Oral Once Joselo Dunn MD   1 tablet at 10/10/24 1059     Medications Prior to Admission   Medication Sig Dispense Refill Last Dose/Taking    atorvastatin 10 MG Oral Tab TAKE 1 TABLET(10 MG) BY MOUTH EVERY NIGHT 30 tablet 0 11/10/2024  Bedtime    Wheat Dextrin (BENEFIBER OR) Take by mouth as needed.   11/9/2024    acetaminophen 325 MG Oral Tab Take 1 tablet (325 mg total) by mouth every 6 (six) hours as needed for Pain.   11/11/2024 at  5:30 AM    albuterol (PROAIR HFA) 108 (90 Base) MCG/ACT Inhalation Aero Soln Inhale 2 puffs into the lungs every 4 (four) hours as needed for Wheezing or Shortness of Breath. 1 each 1 11/10/2024 Bedtime    Polyethylene Glycol 3350 (MIRALAX) 17 g Oral Powd Pack Take 17 g by mouth daily as needed (Take to avoid constipation, especially if taking narcotic pain medications.). 20 packet 1 More than a month

## 2024-11-11 NOTE — DISCHARGE INSTRUCTIONS
You had cystoscopy, ureteroscopy, and stent placement in the operating room today.    Instructions:    - No heavy lifting or strenuous activity for 1 day. You may resume regular activity tomorrow.      - You will return for your next surgery as scheduled- Dr. Dunn will call you in the next 1-2 days to discuss this further.     -  You have a stent (small plastic tube) inside your kidney and ureter to allow the swelling from surgery to resolve. This is only temporary and must be removed, so you should not forget about it.       - Take the aantibiotics for 3 days     - With a ureteral stent in place you may have some discomfort on your side/flank. You can feel pain worsen when you urinate, so try to avoid holding urine and void every 2-3 hours. You also may notice increased blood in the urine as you increase your activity. If this happens increase your hydration and take it easy for a day. Warm baths/hot packs can help with the discomfort as well as your prescribed medications and Advil/Motrin (if you are able to take these).     - You may experience mild pain after the procedure for a few days.  If the pain becomes intolerable please contact our office or go to the nearest Emergency Room or Urgent Care. You should take over the counter ibuprofen (AKA motrin, advil) for mild pain (provided you do not have a medical condition such as stomach ulcers or kidney disease which prohibits you from taking these). You may alternate this with tylenol as well. If pain is still not relieved by tylenol and/or ibuprofen, you may take narcotic pain medication if prescribed (typically oxycodone or tramadol). If you are taking narcotic pain medication this can make you constipated, so you should take over the counter stool softeners or miralax if prescribed.    - Warm pack or hot baths often help with discomfort after cystoscopy.    - If you take blood thinners (such as aspirin or plavix) please hold these medications until 3 days  after surgery.     - You may experience burning and frequency of urination over the next few days. This will improve after a few days if you stay well hydrated. If you were prescribed phenazopyridine (Pyridium) this may relieve urinary discomfort but you can only take this for 3 days. Pyridium will make your urine orange.     - You are likely to see some blood in your urine (pink or light red urine) that should clear up within a few days. Staying well hydrated should help this clear up. If you notice the urine stays dark red or there are multiple large blood clots despite good hydration, please call the urology clinic ([phone]).     - Try to abstain from alcohol, coffee, tea, artificial sweeteners, and spicy food for the next 48 hours as these can irritate the bladder.     - If you develop fevers / chills, difficulty urinating, or abdominal pain that does not improve with pain medications, please call the office.     - Drink 1.5 to 2 liters of fluid today (water is preferable). If you are on a fluid restriction due to other medical reasons then you need to adhere to your fluid restriction recommendations.      Joselo Dunn MD  Staff Urologist  [facility]  Office: [phone]

## 2024-11-15 LAB
CAOX DIHYDRATE: 60 %
CAOX MONOHYDRATE: 40 %
WEIGHT-STONE: 14 MG

## 2024-11-20 ENCOUNTER — TELEPHONE (OUTPATIENT)
Dept: SURGERY | Facility: CLINIC | Age: 64
End: 2024-11-20

## 2024-11-20 NOTE — TELEPHONE ENCOUNTER
I called patient last week regarding her IntraOp findings of very challenging angle to the left lower pole and inability to fully treat stone.  I did discuss that some of this was due to limited mobility from the narrow proximal ureter.  I discussed that we can either proceed with a second look ureteroscopy after dilation to see if I am able to access the rest of the stone versus PCNL.  Of note, she was previously asymptomatic from the stone history other than a remote UTI in 2020.  After a detailed conversation patient would like to proceed with ureteroscopy as scheduled in 2 weeks.  If I am unable to fully treat the stone she will then decide on observation versus PCNL.  She is well-informed of her options and is otherwise doing fine.

## 2024-11-25 ENCOUNTER — TELEPHONE (OUTPATIENT)
Dept: SURGERY | Facility: CLINIC | Age: 64
End: 2024-11-25

## 2024-11-25 ENCOUNTER — OFFICE VISIT (OUTPATIENT)
Dept: FAMILY MEDICINE CLINIC | Facility: CLINIC | Age: 64
End: 2024-11-25
Payer: MEDICARE

## 2024-11-25 VITALS
SYSTOLIC BLOOD PRESSURE: 122 MMHG | TEMPERATURE: 98 F | HEART RATE: 86 BPM | DIASTOLIC BLOOD PRESSURE: 82 MMHG | RESPIRATION RATE: 16 BRPM | OXYGEN SATURATION: 97 %

## 2024-11-25 DIAGNOSIS — Z96.0 URETERAL STENT PRESENT: ICD-10-CM

## 2024-11-25 DIAGNOSIS — R39.9 UTI SYMPTOMS: ICD-10-CM

## 2024-11-25 DIAGNOSIS — N30.01 ACUTE CYSTITIS WITH HEMATURIA: Primary | ICD-10-CM

## 2024-11-25 LAB
APPEARANCE: CLEAR
BILIRUBIN: NEGATIVE
GLUCOSE (URINE DIPSTICK): NEGATIVE MG/DL
KETONES (URINE DIPSTICK): NEGATIVE MG/DL
MULTISTIX LOT#: ABNORMAL NUMERIC
NITRITE, URINE: NEGATIVE
PH, URINE: 7 (ref 4.5–8)
PROTEIN (URINE DIPSTICK): NEGATIVE MG/DL
SPECIFIC GRAVITY: 1.02 (ref 1–1.03)
URINE-COLOR: YELLOW
UROBILINOGEN,SEMI-QN: 0.2 MG/DL (ref 0–1.9)

## 2024-11-25 PROCEDURE — 87086 URINE CULTURE/COLONY COUNT: CPT | Performed by: PHYSICIAN ASSISTANT

## 2024-11-25 RX ORDER — NITROFURANTOIN 25; 75 MG/1; MG/1
100 CAPSULE ORAL 2 TIMES DAILY
Qty: 14 CAPSULE | Refills: 0 | Status: SHIPPED | OUTPATIENT
Start: 2024-11-25 | End: 2024-12-02

## 2024-11-25 RX ORDER — ATORVASTATIN CALCIUM 10 MG/1
10 TABLET, FILM COATED ORAL NIGHTLY
Qty: 90 TABLET | Refills: 1 | Status: SHIPPED | OUTPATIENT
Start: 2024-11-25 | End: 2025-02-23

## 2024-11-25 NOTE — PROGRESS NOTES
CHIEF COMPLAINT:     Chief Complaint   Patient presents with    UTI     Upcoming kidney stone procedure, currently has stent inplace.  Noted burning with urination x 1 day.  (+) chills overnight.  L sided ureter stent for stone, reports scheduled for removal on 12/4/24.         HPI:   Юлия Charles is a 64 year old female  s/p L lithrotripsy with L ureteral stent placement on 11/11/24 who presents today c/o dysuria with urgency/frequency x 1 day.   (+) chills.  Denies abd pain, no flank pain, no gross hematuria.  Normal urine flow.   Scheduled for stent removal on 12/4/24, did not contact urologist RE: sx.   Not currently on abx ppx.   No longer on oxybutynin nor tamsulosin.     H/o constipation, unchanged from baseline, on prn  benefiber alternating with miralax.   APAP prn pain. Last dose last evening.     Not currently taking pyridium    Current Outpatient Medications   Medication Sig Dispense Refill    Wheat Dextrin (BENEFIBER OR) Take by mouth as needed.      atorvastatin 10 MG Oral Tab Take 1 tablet (10 mg total) by mouth nightly. 90 tablet 1    phenazopyridine (PYRIDIUM) 100 MG Oral Tab Take 1 tablet (100 mg total) by mouth 3 (three) times daily as needed for Pain. This will turn your urine orange. (Patient not taking: Reported on 11/25/2024) 10 tablet 0    oxybutynin 5 MG Oral Tab Take 1 tablet (5 mg total) by mouth 3 (three) times daily as needed (Bladder spasms). Stop 12 hours before Murphy catheter removal in clinic (if applicable) (Patient not taking: Reported on 11/25/2024) 15 tablet 0    tamsulosin 0.4 MG Oral Cap Take 1 capsule (0.4 mg total) by mouth every evening for 14 days. (Patient not taking: Reported on 11/25/2024) 14 capsule 0    Polyethylene Glycol 3350 (MIRALAX) 17 g Oral Powd Pack Take 17 g by mouth daily as needed (Take to avoid constipation, especially if taking narcotic pain medications.). (Patient not taking: Reported on 11/25/2024) 20 packet 1    acetaminophen 325 MG Oral Tab  Take 1 tablet (325 mg total) by mouth every 6 (six) hours as needed for Pain.      albuterol (PROAIR HFA) 108 (90 Base) MCG/ACT Inhalation Aero Soln Inhale 2 puffs into the lungs every 4 (four) hours as needed for Wheezing or Shortness of Breath. 1 each 1      Past Medical History:    Arthritis    Bloating    Blood in urine    Breast cancer, right breast (HCC)    Formatting of this note might be different from the original.  S/p b/l mastectomy  In . On Aromacin      Calculus of kidney    Change in hair    Constipation    COPD (chronic obstructive pulmonary disease) (HCC)    No oxygen    Diarrhea, unspecified    Fatigue    Frequent urination    High cholesterol    History of blood transfusion    about 20 years ago    Intracranial tumor ([facility])    Irregular bowel habits    Pain in joints    Personal history of antineoplastic chemotherapy    Last treatment     Stool incontinence    Stress    Visual impairment    glasses      Social History:  Social History     Socioeconomic History    Marital status:    Tobacco Use    Smoking status: Former     Current packs/day: 0.00     Types: Cigarettes     Quit date: 2018     Years since quittin.2    Smokeless tobacco: Never   Vaping Use    Vaping status: Never Used   Substance and Sexual Activity    Alcohol use: Not Currently    Drug use: Not Currently     Types: Cocaine     Comment: long time ago   Other Topics Concern    Caffeine Concern Yes     Comment: coffee occ, pepsi    Exercise Yes     Comment: walking     Social Drivers of Health     Financial Resource Strain: Patient Declined (2024)    Received from [facility]    Overall Financial Resource Strain (CARDIA)     Difficulty of Paying Living Expenses: Patient declined   Food Insecurity: Patient Declined (2024)    Received from [facility] [employer] [facility] [employer] [facility]    Hunger Vital Sign     Worried About Running Out of Food in the Last Year: Patient  declined     Ran Out of Food in the Last Year: Patient declined   Transportation Needs: Patient Declined (8/25/2024)    Received from [facility]    PRAPARE - Transportation     Lack of Transportation (Medical): Patient declined     Lack of Transportation (Non-Medical): Patient declined   Physical Activity: Patient Declined (8/25/2024)    Received from [facility]    Exercise Vital Sign     Days of Exercise per Week: Patient declined     Minutes of Exercise per Session: Patient declined   Stress: Patient Declined (8/25/2024)    Received from [facility]    [employer] [place] [employer] - Occupational Stress Questionnaire     Feeling of Stress : Patient declined   Social Connections: Patient Declined (8/25/2024)    Received from [facility]    Social Connection and Isolation Panel [NHANES]     Frequency of Communication with Friends and Family: Patient declined     Frequency of Social Gatherings with Friends and Family: Patient declined     Attends Sikh Services: Patient declined     Active Member of Clubs or Organizations: Patient declined     Attends Club or Organization Meetings: Patient declined     Marital Status: Patient declined   Housing Stability: Patient Declined (8/25/2024)    Received from [facility]    Housing Stability Vital Sign     Unable to Pay for Housing in the Last Year: Patient declined     Number of Times Moved in the Last Year: 0     Homeless in the Last Year: Patient declined         REVIEW OF SYSTEMS:   GENERAL: See above  GI: See HPI.    : See HPI.      EXAM:   /82   Pulse 86   Temp 97.7 °F (36.5 °C) (Oral)   Resp 16   SpO2 97%   GENERAL: well developed, well nourished,in no apparent distress  CARDIO: RRR, no murmurs  LUNGS: clear to ausculation bilaterally, no wheezing or rhonchi  ABD (+)BS,  soft, ntnd, no masses, no g/r/r, no CVAT.   MS MARTINEZ, no c/c/e.     Recent Results (from the past 24 hours)   URINALYSIS, AUTO, W/O SCOPE    Collection Time: 11/25/24  1:52 PM   Result Value Ref Range    Glucose Urine Negative Negative mg/dL    Bilirubin Urine Negative Negative    Ketones, UA Negative Negative - Trace mg/dL    Spec Gravity 1.020 1.005 - 1.030    Blood Urine Large (A) Negative    PH Urine 7.0 5.0 - 8.0    Protein Urine Negative Negative - Trace mg/dL    Urobilinogen Urine 0.2 0.2 - 1.0 mg/dL    Nitrite Urine Negative Negative    Leukocyte Esterase Urine Small (A) Negative    APPEARANCE clear Clear    Color Urine yellow Yellow    Multistix Lot# [id number] Numeric    Multistix Expiration Date 05/31/2025 Date         ASSESSMENT AND PLAN:   Юлия Charles is a 64 year old female presents with urinary symptoms.    ASSESSMENT:  Encounter Diagnoses   Name Primary?    UTI symptoms Yes    Acute cystitis with hematuria     Ureteral stent present        PLAN:   Urine dip as above cx pending.   Given ureteral stent, consult placed with urology and [facility] med director, Dr. Kelly.  Dr. Dunn contacted via [employer], notified of above, advised tx based off previous (+) culture and send Cx.  Urine cuture order cc'd to Dr. Dunn.   Chart reviewed, neg urine Cx on 10/22, last positive Cx on 9/24 which grew out Enterococcus.  Per med hx review pt was tx with 7 day course of macrobid (result notes list Augmentin however pt has PCN allergy).   Pt notified of above plan of care, preferred pharmacy confirmed.  Pt advised to contact and f/u with urology tomorrow.  In interim, to ED in event new/worsening symptoms at any time.  Юлия v/u.     Maddy Pringle PA-C

## 2024-11-25 NOTE — TELEPHONE ENCOUNTER
Per Maddy MILLS at [facility], patient has a uretal stent and possible UTI. Per Maddy, asking to speak to a nurse to advise.

## 2024-12-02 NOTE — H&P
UROLOGY PRE-OPERATIVE HISTORY & PHYSICAL      Юлия Charles Patient Status:  Hospital Outpatient Surgery    1960 MRN [id number]   [facility] SURGERY Attending Joselo Dunn MD   Hosp Day # 0 PCP Patrizia Louise MD     Primary Care Provider: Patrizia Louise MD     Procedure      CYSTOSCOPY, LEFT URETEROSCOPY, LASER LITHOTRIPSY, STENT EXCHANGE:     History of Present Illness:        Patient is a(n) 64 year old female with hx of right breast cx s/p b/l mastectomy in . COPD, HLD, intracranial tumor s/p  shunt who presents for kidney stones fu sp recent RIGHT URS; now here for second look L URS      Pt recently had KUB done for constipation on 24. Right kidney: 1.3 x 1.7 cm right renal pelvis stone, numerous smaller stones in the mid lower pole of the right kidney. Left kidney: 13 x 7 mm mid lower pole, 9 x 12 mm lower pole, 6 x 4 mm midpole,  2 mm possible distal ureteral stone vs. Phlebolith.   She then saw our team for the above. Had very mild RLQ pain that began a wk ago. No urinary complaints. Had urosepsis in  that required hospitalization but was never told she had stones. At the time did not have any urinary complaints. Otherwise, UTIs infrequently.   Drinks 64oz+ water daily, used to have high salt diet but has since cut down. Unsure if fhx of stones. Never passed stone before.       A CT scan was obtained;   -Moderate right-sided hydronephrosis with a 1.6 x 1.1 cm calculus within the right renal pelvis.  No ureteral calculi are noted.  There are nonobstructing bilateral renal calculi present.     On my review; large stones bilaterally including in the left renal pelvic without hydronephrosis.   She was previously counseled on options and opted for URS.  She did not want the left side addressed that she wanted to avoid bilateral stents.  See previous note for full details of counseling.     She presented for surgery on ;   Findings:  Normal urethra without strictures.  No bladder lesions. Right RPG with large radiopaque stones bilaterally, URS with large ~1.5 cm stone slightly embedded at renal pelvis; lower pole with additional ~1cm stone and large cluster of 2-3mm stones. All stone fragmented/dusted and basket extracted; given extensive stone and poor visualization at conclusion plan for second look to clear out all stone; suspect majority of stone treated.   6x24 JJ stent placed without issue    Second look URS on 10/3;      Normal urethra. No bladder lesions. Stent not encrusted. Right URS with several stone fragments in lower and upper pole; largest remaining fragment ~6mm. Fragments all dusted and basket extracted. No large remaining stones. 6x24 JJ stent placed without issue.      Stone: COM 40%, COD 60%     Stent removed on 10/10; tolerated without issue.   Was counseled on options for left side and opted for URS. She understands this may require 2 surgeries; was booked for 2 surgeries given large size. She again wanted to avoid PCNL.     11/11/24;     Findings:  Normal urethra. No bladder lesions. Left RPG with large lower pole filling defect; radiopaque stone.   Left URS with ~6mm stone in renal pelvis; fully treated. Lower pole with very large >1cm branching stone filling most of calyx- able to fragment this stone but unable to fully treat given angle into lower pole and some slight narrowing at the proximal ureteral which limited mobility. I was unable to reposition stone despite use of basket; tilting patient. Large stone burden remaining on final imaging.    6x24 JJ stent placed without issue.       I discussed IntraOp findings of very challenging angle to the left lower pole and inability to fully treat stone.  I did discuss that some of this was due to limited mobility from the narrow proximal ureter.  I discussed that we can either proceed with a second look ureteroscopy after dilation to see if I am able to access the rest of the stone versus PCNL.  Of note, she  was previously asymptomatic from the stone history other than a remote UTI in .  After a detailed conversation patient would like to proceed with ureteroscopy.  If I am unable to fully treat the stone she will then decide on observation versus PCNL.  She is well-informed of her options and is otherwise doing fine.  The risks discussed included, but were not limited to, urinary tract infection, sepsis, bleeding, injury to urethra/bladder/ureter, urethra stricture, ureteral stricture, inability to treat stone. Patient agreeable to proceed and informed consent obtained.        Ucx: Neg, hx of enterococcus; Levaquin for OR        Stent size 6x24  5' 2\" (1.575 m)    History:     Past Medical History:    Arthritis    Bloating    Blood in urine    Breast cancer, right breast (HCC)    Formatting of this note might be different from the original.  S/p b/l mastectomy  In . On Aromacin      Calculus of kidney    Change in hair    Constipation    COPD (chronic obstructive pulmonary disease) (HCC)    No oxygen    Diarrhea, unspecified    Fatigue    Frequent urination    High cholesterol    History of blood transfusion    about 20 years ago    Intracranial tumor (HCC)    Irregular bowel habits    Pain in joints    Personal history of antineoplastic chemotherapy    Last treatment     Stool incontinence    Stress    Visual impairment    glasses       Past Surgical History:   Procedure Laterality Date    Brain surgery      removal brain tumor          x2    Hysterectomy      Removal of kidney stone      Removal of lung,lobectomy      Tonsillectomy      Wom w/ cydney mastec or hx of mastec         Family History   Problem Relation Age of Onset    Heart Disease Father     Substance Abuse Son     Diabetes Sister     Heart Disease Sister        Social History     Socioeconomic History    Marital status:    Tobacco Use    Smoking status: Former     Current packs/day: 0.00     Types: Cigarettes     Quit date:  2018     Years since quittin.2    Smokeless tobacco: Never   Vaping Use    Vaping status: Never Used   Substance and Sexual Activity    Alcohol use: Not Currently    Drug use: Not Currently     Types: Cocaine     Comment: long time ago   Other Topics Concern    Caffeine Concern Yes     Comment: coffee occ, pepsi    Exercise Yes     Comment: walking     Social Drivers of Health     Financial Resource Strain: Patient Declined (2024)    Received from [facility]    Overall Financial Resource Strain (CARDIA)     Difficulty of Paying Living Expenses: Patient declined   Food Insecurity: Patient Declined (2024)    Received from [facility]    Hunger Vital Sign     Worried About Running Out of Food in the Last Year: Patient declined     Ran Out of Food in the Last Year: Patient declined   Transportation Needs: Patient Declined (2024)    Received from [facility]    PRAPARE - Transportation     Lack of Transportation (Medical): Patient declined     Lack of Transportation (Non-Medical): Patient declined   Physical Activity: Patient Declined (2024)    Received from [facility]    Exercise Vital Sign     Days of Exercise per Week: Patient declined     Minutes of Exercise per Session: Patient declined   Stress: Patient Declined (2024)    Received from [facility]    [employer] - Occupational Stress Questionnaire     Feeling of Stress : Patient declined   Social Connections: Patient Declined (2024)    Received from [facility]    Social Connection and Isolation Panel [NHANES]     Frequency of Communication with Friends and Family: Patient declined     Frequency of Social Gatherings with Friends and Family: Patient declined     Attends Spiritism Services: Patient  declined     Active Member of Clubs or Organizations: Patient declined     Attends Club or Organization Meetings: Patient declined     Marital Status: Patient declined   Housing Stability: Patient Declined (8/25/2024)    Received from [facility]    Housing Stability Vital Sign     Unable to Pay for Housing in the Last Year: Patient declined     Number of Times Moved in the Last Year: 0     Homeless in the Last Year: Patient declined       Medications:  Current Outpatient Medications   Medication Sig Dispense Refill    albuterol (PROAIR HFA) 108 (90 Base) MCG/ACT Inhalation Aero Soln Inhale 2 puffs into the lungs every 4 (four) hours as needed for Wheezing or Shortness of Breath. 1 each 1    atorvastatin 10 MG Oral Tab Take 1 tablet (10 mg total) by mouth nightly. 90 tablet 1    nitrofurantoin monohydrate macro 100 MG Oral Cap Take 1 capsule (100 mg total) by mouth 2 (two) times daily for 7 days. 14 capsule 0    phenazopyridine (PYRIDIUM) 100 MG Oral Tab Take 1 tablet (100 mg total) by mouth 3 (three) times daily as needed for Pain. This will turn your urine orange. (Patient not taking: Reported on 11/25/2024) 10 tablet 0    oxybutynin 5 MG Oral Tab Take 1 tablet (5 mg total) by mouth 3 (three) times daily as needed (Bladder spasms). Stop 12 hours before Murphy catheter removal in clinic (if applicable) (Patient not taking: Reported on 11/25/2024) 15 tablet 0    Polyethylene Glycol 3350 (MIRALAX) 17 g Oral Powd Pack Take 17 g by mouth daily as needed (Take to avoid constipation, especially if taking narcotic pain medications.). (Patient not taking: Reported on 11/25/2024) 20 packet 1    Wheat Dextrin (BENEFIBER OR) Take by mouth as needed.      acetaminophen 325 MG Oral Tab Take 1 tablet (325 mg total) by mouth every 6 (six) hours as needed for Pain.         Allergies:  Allergies[1]    Review of Systems:   A comprehensive 10-point review of systems was completed.  Pertinent positives  and negatives are noted in the the HPI.    Physical Exam:   Vital Signs:  Height 5' 2\" (1.575 m), weight 148 lb (67.1 kg).     CONSTITUTIONAL: Well developed, well nourished, in no acute distress   RESPIRATORY: Normal respiratory effort  ABDOMEN: Soft, non-tender, non-distended      Laboratory Data:  Lab Results   Component Value Date    WBC 5.2 07/16/2024    HGB 13.9 07/16/2024    .0 07/16/2024     Lab Results   Component Value Date     07/16/2024    K 3.8 07/16/2024     07/16/2024    CO2 29.0 07/16/2024    BUN 14 07/16/2024    GLU 94 07/16/2024    GFRAA 111 09/13/2021    AST 18 10/22/2024    ALT 15 10/22/2024    TP 7.3 10/22/2024    ALB 4.6 10/22/2024    CA 9.6 07/16/2024       Urinalysis Results (last three years):  Recent Labs     09/11/24  1415 10/10/24  1059 11/25/24  1352   SPECGRAVITY 1.015 1.015 1.020   PHURINE 6.0 7.0 7.0   NITRITE Negative Negative Negative       Urine Culture Results (last three years):  Lab Results   Component Value Date    URINECUL No Growth at 18-24 hrs. 11/25/2024    URINECUL No Growth 2 Days 10/22/2024    URINECUL 50,000-99,000 CFU/ML Enterococcus faecalis NOT VRE (A) 09/23/2024    URINECUL No Growth 2 Days 09/11/2024       PSA:  No results found for: \"PSA\", \"PERCENTPSA\", \"PSAS\", \"PSAULTRA\"     Imaging (last three days):  No results found.     Assessment:   Юлия Charles is a 64 year old y/o female who presents for the above stated procedure.       Plan:     - OR for CYSTOSCOPY, LEFT URETEROSCOPY, LASER LITHOTRIPSY, STENT EXCHANGE:   - NPO since midnight   - Antibiotics ordered for OR   - Informed consent obtained - risks and benefits explained, and all questions answered  - Marked appropriately     I have personally reviewed all relevant medical records, labs, and imaging.       Joselo Dunn MD  Staff Urologist  [facility]  Office: [phone]         [1]   Allergies  Allergen Reactions    Amoxicillin NAUSEA ONLY     \"At least 20 years ago\"     Tamoxifen OTHER (SEE COMMENTS)     Very emotional

## 2024-12-03 ENCOUNTER — HOSPITAL ENCOUNTER (OUTPATIENT)
Facility: HOSPITAL | Age: 64
Setting detail: HOSPITAL OUTPATIENT SURGERY
Discharge: HOME | End: 2024-12-03
Attending: UROLOGY | Admitting: UROLOGY
Payer: MEDICARE

## 2024-12-03 ENCOUNTER — ANESTHESIA (OUTPATIENT)
Dept: SURGERY | Facility: HOSPITAL | Age: 64
End: 2024-12-03
Payer: MEDICARE

## 2024-12-03 ENCOUNTER — TELEPHONE (OUTPATIENT)
Dept: SURGERY | Facility: CLINIC | Age: 64
End: 2024-12-03

## 2024-12-03 ENCOUNTER — APPOINTMENT (OUTPATIENT)
Dept: GENERAL RADIOLOGY | Facility: HOSPITAL | Age: 64
End: 2024-12-03
Attending: UROLOGY
Payer: MEDICARE

## 2024-12-03 ENCOUNTER — ANESTHESIA EVENT (OUTPATIENT)
Dept: SURGERY | Facility: HOSPITAL | Age: 64
End: 2024-12-03
Payer: MEDICARE

## 2024-12-03 VITALS
DIASTOLIC BLOOD PRESSURE: 88 MMHG | HEIGHT: 62 IN | HEART RATE: 85 BPM | TEMPERATURE: 97 F | OXYGEN SATURATION: 92 % | RESPIRATION RATE: 16 BRPM | BODY MASS INDEX: 27.26 KG/M2 | SYSTOLIC BLOOD PRESSURE: 153 MMHG | WEIGHT: 148.13 LBS

## 2024-12-03 DIAGNOSIS — N20.0 NEPHROLITHIASIS: ICD-10-CM

## 2024-12-03 PROCEDURE — 52356 CYSTO/URETERO W/LITHOTRIPSY: CPT | Performed by: UROLOGY

## 2024-12-03 PROCEDURE — 74420 UROGRAPHY RTRGR +-KUB: CPT | Performed by: UROLOGY

## 2024-12-03 PROCEDURE — BT1F1ZZ FLUOROSCOPY OF LEFT KIDNEY, URETER AND BLADDER USING LOW OSMOLAR CONTRAST: ICD-10-PCS | Performed by: UROLOGY

## 2024-12-03 PROCEDURE — 0T778DZ DILATION OF LEFT URETER WITH INTRALUMINAL DEVICE, VIA NATURAL OR ARTIFICIAL OPENING ENDOSCOPIC: ICD-10-PCS | Performed by: UROLOGY

## 2024-12-03 PROCEDURE — 0TC18ZZ EXTIRPATION OF MATTER FROM LEFT KIDNEY, VIA NATURAL OR ARTIFICIAL OPENING ENDOSCOPIC: ICD-10-PCS | Performed by: UROLOGY

## 2024-12-03 DEVICE — URETERAL STENT
Type: IMPLANTABLE DEVICE | Site: URETER | Status: FUNCTIONAL
Brand: ASCERTA™

## 2024-12-03 RX ORDER — SODIUM CHLORIDE, SODIUM LACTATE, POTASSIUM CHLORIDE, CALCIUM CHLORIDE 600; 310; 30; 20 MG/100ML; MG/100ML; MG/100ML; MG/100ML
INJECTION, SOLUTION INTRAVENOUS CONTINUOUS
Status: DISCONTINUED | OUTPATIENT
Start: 2024-12-03 | End: 2024-12-03

## 2024-12-03 RX ORDER — METOCLOPRAMIDE HYDROCHLORIDE 5 MG/ML
10 INJECTION INTRAMUSCULAR; INTRAVENOUS EVERY 8 HOURS PRN
Status: DISCONTINUED | OUTPATIENT
Start: 2024-12-03 | End: 2024-12-03

## 2024-12-03 RX ORDER — SCOLOPAMINE TRANSDERMAL SYSTEM 1 MG/1
1 PATCH, EXTENDED RELEASE TRANSDERMAL ONCE
Status: DISCONTINUED | OUTPATIENT
Start: 2024-12-03 | End: 2024-12-03 | Stop reason: HOSPADM

## 2024-12-03 RX ORDER — ONDANSETRON 2 MG/ML
4 INJECTION INTRAMUSCULAR; INTRAVENOUS EVERY 6 HOURS PRN
Status: DISCONTINUED | OUTPATIENT
Start: 2024-12-03 | End: 2024-12-03

## 2024-12-03 RX ORDER — ESMOLOL HYDROCHLORIDE 10 MG/ML
INJECTION INTRAVENOUS AS NEEDED
Status: DISCONTINUED | OUTPATIENT
Start: 2024-12-03 | End: 2024-12-03 | Stop reason: SURG

## 2024-12-03 RX ORDER — HYDROMORPHONE HYDROCHLORIDE 1 MG/ML
0.2 INJECTION, SOLUTION INTRAMUSCULAR; INTRAVENOUS; SUBCUTANEOUS EVERY 5 MIN PRN
Status: DISCONTINUED | OUTPATIENT
Start: 2024-12-03 | End: 2024-12-03

## 2024-12-03 RX ORDER — LEVOFLOXACIN 5 MG/ML
500 INJECTION, SOLUTION INTRAVENOUS
Status: COMPLETED | OUTPATIENT
Start: 2024-12-03 | End: 2024-12-03

## 2024-12-03 RX ORDER — IBUPROFEN 600 MG/1
600 TABLET, FILM COATED ORAL ONCE AS NEEDED
Status: DISCONTINUED | OUTPATIENT
Start: 2024-12-03 | End: 2024-12-03

## 2024-12-03 RX ORDER — MEPERIDINE HYDROCHLORIDE 25 MG/ML
12.5 INJECTION INTRAMUSCULAR; INTRAVENOUS; SUBCUTANEOUS AS NEEDED
Status: DISCONTINUED | OUTPATIENT
Start: 2024-12-03 | End: 2024-12-03

## 2024-12-03 RX ORDER — LIDOCAINE HYDROCHLORIDE 10 MG/ML
INJECTION, SOLUTION EPIDURAL; INFILTRATION; INTRACAUDAL; PERINEURAL AS NEEDED
Status: DISCONTINUED | OUTPATIENT
Start: 2024-12-03 | End: 2024-12-03 | Stop reason: SURG

## 2024-12-03 RX ORDER — MIDAZOLAM HYDROCHLORIDE 1 MG/ML
1 INJECTION INTRAMUSCULAR; INTRAVENOUS EVERY 5 MIN PRN
Status: DISCONTINUED | OUTPATIENT
Start: 2024-12-03 | End: 2024-12-03

## 2024-12-03 RX ORDER — LABETALOL HYDROCHLORIDE 5 MG/ML
5 INJECTION, SOLUTION INTRAVENOUS EVERY 5 MIN PRN
Status: DISCONTINUED | OUTPATIENT
Start: 2024-12-03 | End: 2024-12-03

## 2024-12-03 RX ORDER — ACETAMINOPHEN 500 MG
1000 TABLET ORAL ONCE
Status: DISCONTINUED | OUTPATIENT
Start: 2024-12-03 | End: 2024-12-03 | Stop reason: HOSPADM

## 2024-12-03 RX ORDER — LEVOFLOXACIN 500 MG/1
500 TABLET, FILM COATED ORAL DAILY
Qty: 3 TABLET | Refills: 0 | Status: SHIPPED | OUTPATIENT
Start: 2024-12-04 | End: 2024-12-07

## 2024-12-03 RX ORDER — DEXAMETHASONE SODIUM PHOSPHATE 4 MG/ML
VIAL (ML) INJECTION AS NEEDED
Status: DISCONTINUED | OUTPATIENT
Start: 2024-12-03 | End: 2024-12-03 | Stop reason: SURG

## 2024-12-03 RX ORDER — NALOXONE HYDROCHLORIDE 0.4 MG/ML
0.08 INJECTION, SOLUTION INTRAMUSCULAR; INTRAVENOUS; SUBCUTANEOUS AS NEEDED
Status: DISCONTINUED | OUTPATIENT
Start: 2024-12-03 | End: 2024-12-03

## 2024-12-03 RX ORDER — LIDOCAINE HYDROCHLORIDE 20 MG/ML
JELLY TOPICAL AS NEEDED
Status: DISCONTINUED | OUTPATIENT
Start: 2024-12-03 | End: 2024-12-03 | Stop reason: HOSPADM

## 2024-12-03 RX ORDER — HYDROMORPHONE HYDROCHLORIDE 1 MG/ML
0.6 INJECTION, SOLUTION INTRAMUSCULAR; INTRAVENOUS; SUBCUTANEOUS EVERY 5 MIN PRN
Status: DISCONTINUED | OUTPATIENT
Start: 2024-12-03 | End: 2024-12-03

## 2024-12-03 RX ORDER — MIDAZOLAM HYDROCHLORIDE 1 MG/ML
INJECTION INTRAMUSCULAR; INTRAVENOUS AS NEEDED
Status: DISCONTINUED | OUTPATIENT
Start: 2024-12-03 | End: 2024-12-03 | Stop reason: SURG

## 2024-12-03 RX ORDER — HYDROCODONE BITARTRATE AND ACETAMINOPHEN 5; 325 MG/1; MG/1
2 TABLET ORAL ONCE AS NEEDED
Status: DISCONTINUED | OUTPATIENT
Start: 2024-12-03 | End: 2024-12-03

## 2024-12-03 RX ORDER — HYDROCODONE BITARTRATE AND ACETAMINOPHEN 5; 325 MG/1; MG/1
1 TABLET ORAL ONCE AS NEEDED
Status: DISCONTINUED | OUTPATIENT
Start: 2024-12-03 | End: 2024-12-03

## 2024-12-03 RX ORDER — HYDROMORPHONE HYDROCHLORIDE 1 MG/ML
0.4 INJECTION, SOLUTION INTRAMUSCULAR; INTRAVENOUS; SUBCUTANEOUS EVERY 5 MIN PRN
Status: DISCONTINUED | OUTPATIENT
Start: 2024-12-03 | End: 2024-12-03

## 2024-12-03 RX ORDER — ONDANSETRON 2 MG/ML
INJECTION INTRAMUSCULAR; INTRAVENOUS AS NEEDED
Status: DISCONTINUED | OUTPATIENT
Start: 2024-12-03 | End: 2024-12-03 | Stop reason: SURG

## 2024-12-03 RX ORDER — PHENAZOPYRIDINE HYDROCHLORIDE 100 MG/1
100 TABLET, FILM COATED ORAL 3 TIMES DAILY PRN
Qty: 10 TABLET | Refills: 0 | Status: SHIPPED | OUTPATIENT
Start: 2024-12-03

## 2024-12-03 RX ORDER — ACETAMINOPHEN 500 MG
1000 TABLET ORAL ONCE AS NEEDED
Status: DISCONTINUED | OUTPATIENT
Start: 2024-12-03 | End: 2024-12-03

## 2024-12-03 RX ORDER — ROCURONIUM BROMIDE 10 MG/ML
INJECTION, SOLUTION INTRAVENOUS AS NEEDED
Status: DISCONTINUED | OUTPATIENT
Start: 2024-12-03 | End: 2024-12-03 | Stop reason: SURG

## 2024-12-03 RX ADMIN — MIDAZOLAM HYDROCHLORIDE 2 MG: 1 INJECTION INTRAMUSCULAR; INTRAVENOUS at 10:21:00

## 2024-12-03 RX ADMIN — SODIUM CHLORIDE, SODIUM LACTATE, POTASSIUM CHLORIDE, CALCIUM CHLORIDE: 600; 310; 30; 20 INJECTION, SOLUTION INTRAVENOUS at 10:23:00

## 2024-12-03 RX ADMIN — DEXAMETHASONE SODIUM PHOSPHATE 8 MG: 4 MG/ML VIAL (ML) INJECTION at 10:28:00

## 2024-12-03 RX ADMIN — ONDANSETRON 4 MG: 2 INJECTION INTRAMUSCULAR; INTRAVENOUS at 10:29:00

## 2024-12-03 RX ADMIN — LEVOFLOXACIN 500 MG: 5 INJECTION, SOLUTION INTRAVENOUS at 10:28:00

## 2024-12-03 RX ADMIN — LIDOCAINE HYDROCHLORIDE 50 MG: 10 INJECTION, SOLUTION EPIDURAL; INFILTRATION; INTRACAUDAL; PERINEURAL at 10:23:00

## 2024-12-03 RX ADMIN — SODIUM CHLORIDE, SODIUM LACTATE, POTASSIUM CHLORIDE, CALCIUM CHLORIDE: 600; 310; 30; 20 INJECTION, SOLUTION INTRAVENOUS at 11:55:00

## 2024-12-03 RX ADMIN — ROCURONIUM BROMIDE 50 MG: 10 INJECTION, SOLUTION INTRAVENOUS at 10:24:00

## 2024-12-03 RX ADMIN — ESMOLOL HYDROCHLORIDE 20 MG: 10 INJECTION INTRAVENOUS at 10:57:00

## 2024-12-03 NOTE — ANESTHESIA PROCEDURE NOTES
Airway  Date/Time: 12/3/2024 10:26 AM  Urgency: elective    Airway not difficult    General Information and Staff    Patient location during procedure: OR  Anesthesiologist: Andrew Bergman MD  Resident/CRNA: Erika Morales CRNA  Performed: CRNA   Performed by: Erika Morales CRNA  Authorized by: Andrew Bergman MD      Indications and Patient Condition  Indications for airway management: anesthesia  Sedation level: deep  Preoxygenated: yes  Patient position: sniffing  Mask difficulty assessment: 1 - vent by mask    Final Airway Details  Final airway type: endotracheal airway      Successful airway: ETT  Cuffed: yes   Successful intubation technique: Video laryngoscopy  Endotracheal tube insertion site: oral  Blade: GlideScope  Blade size: #3  ETT size (mm): 7.0    Cormack-Lehane Classification: grade I - full view of glottis  Placement verified by: capnometry   Measured from: lips  ETT to lips (cm): 21  Number of attempts at approach: 1    Additional Comments  Atraumatic intubation. Dentition and OP as per preop.

## 2024-12-03 NOTE — OPERATIVE REPORT
Urology Operative Note    Attending Surgeon: Joselo Dunn MD    Assistant Surgeon: None    Patient Name: Юлия Charles    Date of Surgery: 12/3/2024    Preoperative Diagnosis: LEFT nephrolithiasis    Postoperative Diagnosis: Same    Procedure Performed:   Cystoscopy, LEFT ureteroscopy, laser lithotripsy, basket stone extraction, retrograde pyelogram, ureteral stent placement    Indication:  Patient is a(n) 64 year old female with hx of right breast cx s/p b/l mastectomy in 2008. COPD, HLD, intracranial tumor s/p  shunt who presents for kidney stones fu sp recent RIGHT URS; now here for second look L URS      Pt recently had KUB done for constipation on 9/5/24. Right kidney: 1.3 x 1.7 cm right renal pelvis stone, numerous smaller stones in the mid lower pole of the right kidney. Left kidney: 13 x 7 mm mid lower pole, 9 x 12 mm lower pole, 6 x 4 mm midpole,  2 mm possible distal ureteral stone vs. Phlebolith.   She then saw our team for the above. Had very mild RLQ pain that began a wk ago. No urinary complaints. Had urosepsis in 2020 that required hospitalization but was never told she had stones. At the time did not have any urinary complaints. Otherwise, UTIs infrequently.   Drinks 64oz+ water daily, used to have high salt diet but has since cut down. Unsure if fhx of stones. Never passed stone before.       A CT scan was obtained;   -Moderate right-sided hydronephrosis with a 1.6 x 1.1 cm calculus within the right renal pelvis.  No ureteral calculi are noted.  There are nonobstructing bilateral renal calculi present.     On my review; large stones bilaterally including in the left renal pelvic without hydronephrosis.   She was previously counseled on options and opted for URS.  She did not want the left side addressed that she wanted to avoid bilateral stents.  See previous note for full details of counseling.     She presented for surgery on 9/17;   Findings:  Normal urethra without strictures. No bladder  lesions. Right RPG with large radiopaque stones bilaterally, URS with large ~1.5 cm stone slightly embedded at renal pelvis; lower pole with additional ~1cm stone and large cluster of 2-3mm stones. All stone fragmented/dusted and basket extracted; given extensive stone and poor visualization at conclusion plan for second look to clear out all stone; suspect majority of stone treated.   6x24 JJ stent placed without issue     Second look URS on 10/3;      Normal urethra. No bladder lesions. Stent not encrusted. Right URS with several stone fragments in lower and upper pole; largest remaining fragment ~6mm. Fragments all dusted and basket extracted. No large remaining stones. 6x24 JJ stent placed without issue.      Stone: COM 40%, COD 60%     Stent removed on 10/10; tolerated without issue.   Was counseled on options for left side and opted for URS. She understands this may require 2 surgeries; was booked for 2 surgeries given large size. She again wanted to avoid PCNL.      11/11/24;      Findings:  Normal urethra. No bladder lesions. Left RPG with large lower pole filling defect; radiopaque stone.   Left URS with ~6mm stone in renal pelvis; fully treated. Lower pole with very large >1cm branching stone filling most of calyx- able to fragment this stone but unable to fully treat given angle into lower pole and some slight narrowing at the proximal ureteral which limited mobility. I was unable to reposition stone despite use of basket; tilting patient. Large stone burden remaining on final imaging.    6x24 JJ stent placed without issue.       I discussed IntraOp findings of very challenging angle to the left lower pole and inability to fully treat stone.  I did discuss that some of this was due to limited mobility from the narrow proximal ureter.  I discussed that we can either proceed with a second look ureteroscopy after dilation to see if I am able to access the rest of the stone versus PCNL.  Of note, she was  previously asymptomatic from the stone history other than a remote UTI in 2020.  After a detailed conversation patient would like to proceed with ureteroscopy.  If I am unable to fully treat the stone she will then decide on observation versus PCNL.  She is well-informed of her options and is otherwise doing fine.  The risks discussed included, but were not limited to, urinary tract infection, sepsis, bleeding, injury to urethra/bladder/ureter, urethra stricture, ureteral stricture, inability to treat stone. Patient agreeable to proceed and informed consent obtained.         Findings:    Normal urethra. No bladder lesions; stent not encrusted.   Left RPG without hydronephrosis. Left ureteroscopy with narrow proximal ureter despite pre-stenting.  Stone fragments from previous surgery noted. Large lower pole stone now able to be access with patient being bumped on left side and in slight trendelenburg- all visible stone fragmented/dusted. Small area of residual radiopaque findings in lower pole c/w area of nephrocalcinosis- no visible stone in this area.   Left 6x24 JJ stent placed without issue.     Procedure:  The patient was taken to the operating room and a timeout was performed confirming the correct patient and procedure. The patient was prepped and draped in lithotomy position after undergoing general anesthesia. Pre-operative prophylactic antibiotics were given in the form of Levaquin.     The cystoscope was inserted per urethra and the bladder was inspected and drained. The LEFT ureteral stent was grasped and pulled to the urethral meatus with a cystoscopic grasper. The stent was cannulated with a Sensor wire, and the wire was passed into the renal pelvis which I confirmed using fluoroscopy. The stent was fully removed.     Then a dual lumen catheter was passed over the wire and into the distal ureter. A retrograde pyelogram was obtained demonstrating appropriate wire placement, no hydronephrosis. Lower pole  radiopaque stone noted.   I then placed a second sensor wire through the dual lumen catheter under fluoroscopy. Once both wires were confirmed to be in the kidney I removed the dual lumen catheter. One wire was then secured to the drapes as a safety wire while the other was our working wire.      Over our working wire we gently introduced a 12/14F x 28cm ureteral access sheath under fluoroscopic visualization to the level of the proximal ureter. This passed easily. Once that was done, we then removed the inner sheath and wire.  The flexible ureteroscope was advanced into the sheath and up into the left renal pelvis.  A systematic inspection of the kidney revealed slightly narrow proximal ureter despite pre-stenting. Previous stone fragments noted. Lower pole large remaining ~1cm branching stone noted. Patient had been positioned with bump under left side and was in slight trendelenburg- I was then able to access the stone without issue. The stone was fragmented using a laser, and then the fragments were extracted using a zero tip basket. Previous stone fragments from first ureteroscopy were also removed.   Any smaller remaining stone fragments were lasered to dust, and thoroughly irrigated. All renal calyces were surveyed once more, and no large fragments were seen. A retrograde pyelogram was performed through the scope and showed no extravasation.   There was persistent radiopaque area in the lower pole that appeared to be nephrocalcinosis- no stone visible in this area.     The ureter was inspected while slowly removing the scope and access sheath, and it appeared healthy without stone fragments seen.    We then placed a 6x24  double-J ureteral stent over our safety wire under direct cystoscopic and fluoroscopic guidance. The proximal and distal curls formed appropriately. Stent left without strings. The bladder was then drained and the cystoscope was removed. The procedure was then terminated.    The patient was  awoken from anesthesia and transferred to PACU in stable condition. The patient tolerated the procedure well. All instrument/supply counts were correct at the end of the case.    Specimens:   Stone fragments for chemical analysis    Estimated Blood Loss:  Minimal    Tubes/Drains:  6-[place]orran x 24 cm JJ LEFT ureteral stent    Complications:   None immediate    Condition from OR:  Stable    Plan:   Abx x3 days    Return for stent removal next week         Joselo Dunn MD  Staff Urologist  [facility]  Office: [phone]

## 2024-12-03 NOTE — ANESTHESIA POSTPROCEDURE EVALUATION
[facility]    Юлия Charles Patient Status:  Hospital Outpatient Surgery   Age/Gender 64 year old female MRN [id number]   Location [place] [facility] ANESTHESIA CARE UNIT Attending Joselo Dunn MD   Hosp Day # 0 PCP Patrizia Louise MD       Anesthesia Post-op Note    CYSTOSCOPY, LEFT URETEROSCOPY, LASER LITHOTRIPSY, STENT EXCHANGE    Procedure Summary       Date: 12/03/24 Room / Location:  MAIN OR 07 /  MAIN OR    Anesthesia Start: 1015 Anesthesia Stop: 1200    Procedure: CYSTOSCOPY, LEFT URETEROSCOPY, LASER LITHOTRIPSY, STENT EXCHANGE (Left: Ureter) Diagnosis:       Nephrolithiasis      (Nephrolithiasis [N20.0])    Surgeons: Joselo Dunn MD Anesthesiologist: Andrew Bergman MD    Anesthesia Type: general ASA Status: 2            Anesthesia Type: general    Vitals Value Taken Time   /74 12/03/24 1200   Temp 97.5 12/03/24 1202   Pulse 94 12/03/24 1202   Resp 18 12/03/24 1202   SpO2 97 % 12/03/24 1202   Vitals shown include unfiled device data.    Patient Location: PACU    Anesthesia Type: general    Airway Patency: patent    Postop Pain Control: adequate    Mental Status: mildly sedated but able to meaningfully participate in the post-anesthesia evaluation    Nausea/Vomiting: none    Cardiopulmonary/Hydration status: stable euvolemic    Complications: no apparent anesthesia related complications    Postop vital signs: stable    Comments: Report to PACU RN Maco.    Dental Exam: Unchanged from Preop    Patient to be discharged from PACU when criteria met.

## 2024-12-03 NOTE — DISCHARGE INSTRUCTIONS
You had cystoscopy, ureteroscopy, and stent placement in the operating room today.    Instructions:    - No heavy lifting or strenuous activity for 1 day. You may resume regular activity tomorrow.       - Your follow-up appointment is listed below. Please contact us at [phone] if you need to change your appointment.     Your appointments       Date & Time Appointment Department ([place])    Dec 11, 2024 9:00 AM CST Post Op Visit with Joselo Dunn MD [facility] ([place] [address])        Mar 04, 2025 3:00 PM CST Follow Up Visit with Oliver Pierre MD [facility] ([facility])              [facility]  [address]  [facility] [id number] [phone] [facility]  [address]  [facility] [id number] [phone]             -  You have a stent (small plastic tube) inside your kidney and ureter to allow the swelling from surgery to resolve. This is only temporary and must be removed, so you should not forget about it. We will remove your stent via a brief cystoscopy in clinic when you return. If you have to miss this appointment for some reason please make sure you re-schedule it.        - With a ureteral stent in place you may have some discomfort on your side/flank. You can feel pain worsen when you urinate, so try to avoid holding urine and void every 2-3 hours. You also may notice increased blood in the urine as you increase your activity. If this happens increase your hydration and take it easy for a day. Warm baths/hot packs can help with the discomfort as well as your prescribed medications and Advil/Motrin (if you are able to take these).     - You may experience mild pain after the procedure for a few days.  If the pain becomes  intolerable please contact our office or go to the nearest Emergency Room or Urgent Care. You should take over the counter ibuprofen (AKA motrin, advil) for mild pain (provided you do not have a medical condition such as stomach ulcers or kidney disease which prohibits you from taking these). You may alternate this with tylenol as well. If pain is still not relieved by tylenol and/or ibuprofen, you may take narcotic pain medication if prescribed (typically oxycodone or tramadol). If you are taking narcotic pain medication this can make you constipated, so you should take over the counter stool softeners or miralax if prescribed.    - Warm pack or hot baths often help with discomfort after cystoscopy.    - If you take blood thinners (such as aspirin or plavix) please hold these medications until 3 days after surgery.     - Take antibitoics for 3 days     - You may experience burning and frequency of urination over the next few days. This will improve after a few days if you stay well hydrated. If you were prescribed phenazopyridine (Pyridium) this may relieve urinary discomfort but you can only take this for 3 days. Pyridium will make your urine orange.     - You are likely to see some blood in your urine (pink or light red urine) that should clear up within a few days. Staying well hydrated should help this clear up. If you notice the urine stays dark red or there are multiple large blood clots despite good hydration, please call the urology clinic ([phone]).     - Try to abstain from alcohol, coffee, tea, artificial sweeteners, and spicy food for the next 48 hours as these can irritate the bladder.     - If you develop fevers / chills, difficulty urinating, or abdominal pain that does not improve with pain medications, please call the office.     - Drink 1.5 to 2 liters of fluid today (water is preferable). If you are on a fluid restriction due to other medical reasons then you need to adhere to your fluid  restriction recommendations.      Joselo Dunn MD  Staff Urologist  [facility]  Office: [phone]

## 2024-12-03 NOTE — ANESTHESIA PREPROCEDURE EVALUATION
PRE-OP EVALUATION    Patient Name: Юлия Charles    Admit Diagnosis: Nephrolithiasis [N20.0]    Pre-op Diagnosis: Nephrolithiasis [N20.0]    CYSTOSCOPY, LEFT URETEROSCOPY, LASER LITHOTRIPSY, STENT EXCHANGE    Anesthesia Procedure: CYSTOSCOPY, LEFT URETEROSCOPY, LASER LITHOTRIPSY, STENT EXCHANGE (Left)    Surgeons and Role:     * Joselo Dunn MD - Primary    Pre-op vitals reviewed.  Temp: 97.8 °F (36.6 °C)  Pulse: 70  Resp: 14  BP: 134/79  SpO2: 97 %  Body mass index is 27.1 kg/m².    Current medications reviewed.  Hospital Medications:   [Transfer Hold] acetaminophen (Tylenol Extra Strength) tab 1,000 mg  1,000 mg Oral Once    [Transfer Hold] scopolamine (Transderm-Scop) 1 MG/3DAYS patch 1 patch  1 patch Transdermal Once    lactated ringers infusion   Intravenous Continuous    levoFLOXacin in dextrose 5% (Levaquin) 500 mg/100mL IVPB premix 500 mg  500 mg Intravenous Once (Intra-Op)    [COMPLETED] levoFLOXacin in dextrose 5% (Levaquin) 500 mg/100mL IVPB premix 500 mg  500 mg Intravenous Once (Intra-Op)       Outpatient Medications:   Prescriptions Prior to Admission[1]    Allergies: Amoxicillin and Tamoxifen      Anesthesia Evaluation    Patient summary reviewed.    Anesthetic Complications  (-) history of anesthetic complications         GI/Hepatic/Renal    Negative GI/hepatic/renal ROS.                             Cardiovascular        Exercise tolerance: good     MET: >4         (+) hyperlipidemia                                  Endo/Other    Negative endo/other ROS.                              Pulmonary        (+) COPD                   Neuro/Psych    Negative neuro/psych ROS.                                  Past Surgical History:   Procedure Laterality Date    Brain surgery      removal brain tumor          x2    Hysterectomy      Removal of kidney stone      Removal of lung,lobectomy      Tonsillectomy      Wom w/ cydney mastec or hx of mastec       Social History     Socioeconomic History     Marital status:    Tobacco Use    Smoking status: Former     Current packs/day: 0.00     Types: Cigarettes     Quit date: 2018     Years since quittin.2    Smokeless tobacco: Never   Vaping Use    Vaping status: Never Used   Substance and Sexual Activity    Alcohol use: Not Currently    Drug use: Not Currently     Types: Cocaine     Comment: long time ago   Other Topics Concern    Caffeine Concern Yes     Comment: coffee occ, pepsi    Exercise Yes     Comment: walking     History   Drug Use Unknown     Comment: long time ago     Available pre-op labs reviewed.               Airway      Mallampati: II  Mouth opening: >3 FB  TM distance: 4 - 6 cm  Neck ROM: full Cardiovascular    Cardiovascular exam normal.         Dental             Pulmonary    Pulmonary exam normal.                 Other findings              ASA: 2   Plan: general  NPO status verified and patient meets guidelines.          Plan/risks discussed with: patient                Present on Admission:  **None**             [1]   Medications Prior to Admission   Medication Sig Dispense Refill Last Dose/Taking    atorvastatin 10 MG Oral Tab Take 1 tablet (10 mg total) by mouth nightly. 90 tablet 1 2024 at  7:00 PM    acetaminophen 325 MG Oral Tab Take 1 tablet (325 mg total) by mouth every 6 (six) hours as needed for Pain.   12/3/2024 at  5:30 AM    albuterol (PROAIR HFA) 108 (90 Base) MCG/ACT Inhalation Aero Soln Inhale 2 puffs into the lungs every 4 (four) hours as needed for Wheezing or Shortness of Breath. 1 each 1 2024 at 12:00 PM    [] nitrofurantoin monohydrate macro 100 MG Oral Cap Take 1 capsule (100 mg total) by mouth 2 (two) times daily for 7 days. 14 capsule 0     [] levoFLOXacin 500 MG Oral Tab Take 1 tablet (500 mg total) by mouth daily for 3 days. 3 tablet 0     phenazopyridine (PYRIDIUM) 100 MG Oral Tab Take 1 tablet (100 mg total) by mouth 3 (three) times daily as needed for Pain. This will turn  your urine orange. (Patient not taking: Reported on 2024) 10 tablet 0 2024    oxybutynin 5 MG Oral Tab Take 1 tablet (5 mg total) by mouth 3 (three) times daily as needed (Bladder spasms). Stop 12 hours before Murphy catheter removal in clinic (if applicable) (Patient not taking: Reported on 2024) 15 tablet 0 2024    [] tamsulosin 0.4 MG Oral Cap Take 1 capsule (0.4 mg total) by mouth every evening for 14 days. (Patient not taking: Reported on 2024) 14 capsule 0     Polyethylene Glycol 3350 (MIRALAX) 17 g Oral Powd Pack Take 17 g by mouth daily as needed (Take to avoid constipation, especially if taking narcotic pain medications.). (Patient not taking: Reported on 2024) 20 packet 1 More than a month    Wheat Dextrin (BENEFIBER OR) Take by mouth as needed.   2024

## 2024-12-04 DIAGNOSIS — J44.9 CHRONIC OBSTRUCTIVE PULMONARY DISEASE, UNSPECIFIED COPD TYPE (HCC): ICD-10-CM

## 2024-12-04 RX ORDER — ALBUTEROL SULFATE 90 UG/1
2 INHALANT RESPIRATORY (INHALATION) EVERY 4 HOURS PRN
Qty: 8.5 G | Refills: 0 | Status: SHIPPED | OUTPATIENT
Start: 2024-12-04

## 2024-12-06 LAB
CAOX DIHYDRATE: 30 %
CAOX MONOHYDRATE: 65 %
HYDROXYAPATITE: 5 %
WEIGHT-STONE: 75 MG

## 2024-12-10 NOTE — PROGRESS NOTES
Clinic Procedure Note    INDICATIONS:      Patient is a(n) 64 year old female with hx of right breast cx s/p b/l mastectomy in 2008. COPD, HLD, intracranial tumor s/p  shunt who presents for kidney stones fu sp recent RIGHT URS; now sp L URS here for stent removal.      Pt recently had KUB done for constipation on 9/5/24. Right kidney: 1.3 x 1.7 cm right renal pelvis stone, numerous smaller stones in the mid lower pole of the right kidney. Left kidney: 13 x 7 mm mid lower pole, 9 x 12 mm lower pole, 6 x 4 mm midpole,  2 mm possible distal ureteral stone vs. Phlebolith.   She then saw our team for the above. Had very mild RLQ pain that began a wk ago. No urinary complaints. Had urosepsis in 2020 that required hospitalization but was never told she had stones. At the time did not have any urinary complaints. Otherwise, UTIs infrequently.   Drinks 64oz+ water daily, used to have high salt diet but has since cut down. Unsure if fhx of stones. Never passed stone before.       A CT scan was obtained;   -Moderate right-sided hydronephrosis with a 1.6 x 1.1 cm calculus within the right renal pelvis.  No ureteral calculi are noted.  There are nonobstructing bilateral renal calculi present.     On my review; large stones bilaterally including in the left renal pelvic without hydronephrosis.   She was previously counseled on options and opted for URS.  She did not want the left side addressed that she wanted to avoid bilateral stents.  See previous note for full details of counseling.     She presented for surgery on 9/17;   Findings:  Normal urethra without strictures. No bladder lesions. Right RPG with large radiopaque stones bilaterally, URS with large ~1.5 cm stone slightly embedded at renal pelvis; lower pole with additional ~1cm stone and large cluster of 2-3mm stones. All stone fragmented/dusted and basket extracted; given extensive stone and poor visualization at conclusion plan for second look to clear out all  stone; suspect majority of stone treated.   6x24 JJ stent placed without issue     Second look URS on 10/3;      Normal urethra. No bladder lesions. Stent not encrusted. Right URS with several stone fragments in lower and upper pole; largest remaining fragment ~6mm. Fragments all dusted and basket extracted. No large remaining stones. 6x24 JJ stent placed without issue.      Stone: COM 40%, COD 60%     Stent removed on 10/10; tolerated without issue.   Was counseled on options for left side and opted for URS. She understands this may require 2 surgeries; was booked for 2 surgeries given large size. She again wanted to avoid PCNL.      11/11/24;      Findings:  Normal urethra. No bladder lesions. Left RPG with large lower pole filling defect; radiopaque stone.   Left URS with ~6mm stone in renal pelvis; fully treated. Lower pole with very large >1cm branching stone filling most of calyx- able to fragment this stone but unable to fully treat given angle into lower pole and some slight narrowing at the proximal ureteral which limited mobility. I was unable to reposition stone despite use of basket; tilting patient. Large stone burden remaining on final imaging.    6x24 JJ stent placed without issue.       I discussed IntraOp findings of very challenging angle to the left lower pole and inability to fully treat stone.  We discussed options for PCNL vs repeat URS; she wanted repeat URS.      Taken to OR on 12/3;     Left RPG without hydronephrosis. Left ureteroscopy with narrow proximal ureter despite pre-stenting.  Stone fragments from previous surgery noted. Large lower pole stone now able to be access with patient being bumped on left side and in slight trendelenburg- all visible stone fragmented/dusted. Small area of residual radiopaque findings in lower pole c/w area of nephrocalcinosis- no visible stone in this area.   Left 6x24 JJ stent placed without issue.       Stone analysis:   COM 65%   COD 30%  HA  5%        PROCEDURE:       1. Flexible cystoscopy, removal of LEFT ureteral stent ([id number])    DATE OF PROCEDURE: 12/10/2024     PRE-PROCEDURE DIAGNOSIS: Nephrolithiasis    POST-PROCEDURE DIAGNOSIS: Same     SURGEON: Joselo Dunn MD    FINDINGS:  Stent successfully removed in its entirety.     PROCEDURE:   Patient was brought to the procedure suite and a time-out was performed identifiying the patient,  and procedure to be performed. The risks and benefits of the procedure were once again discussed with the patient including bleeding, infection, and dysuria. The patient agreed to proceed. The patient did not have any signs or symptoms of active UTI. Prophylactic PO antibiotics were given in clinic.    The patient was placed in supine position on the table and the genital area was prepped and draped in the standard sterile fashion. Urojet was used prior for local anesthesia effect. A flexible cystoscope was inserted per urethra. There were no urethral strictures present. The bladder was entered and the distal coil of the stent was seen protruding from the ureteral orifice. The stent was grasped with the flexible stent grasper, and then the stent and cystoscope were both removed. The stent was visualized outside the body and confirmed to be intact and fully removed.     There were no complications and the patient tolerated the procedure well.    IMPRESSION:  Successful stent removal after ureteroscopy today.    PLAN:   -Renal/bladder ultrasound   in 8 weeks to ensure no silent hydronephrosis  -24 hour urine study and blood work (uric acid, pth, calcium) for metabolic workup    I discussed general fluid and dietary guidelines to help prevent further stone formation including:    - Fluid consumption of preferably water to make 2-2.5  L/day of urine  - Low sodium consumption  - Adequate calcium consumption (approximately 1200 mg/day)  - Low fat and moderate animal protein consumption  - Limit consumption  of oxalate  rich foods   - I encouraged increased dietary intake of citrate with lemon juice (4 oz day)       Return in 3/4 months       Joselo Dunn MD  Staff Urologist  [facility]  Office: [phone]

## 2024-12-11 ENCOUNTER — OFFICE VISIT (OUTPATIENT)
Dept: SURGERY | Facility: CLINIC | Age: 64
End: 2024-12-11

## 2024-12-11 DIAGNOSIS — N20.0 KIDNEY STONE: ICD-10-CM

## 2024-12-11 DIAGNOSIS — N20.0 NEPHROLITHIASIS: Primary | ICD-10-CM

## 2024-12-11 LAB
APPEARANCE: CLEAR
BILIRUBIN: NEGATIVE
GLUCOSE (URINE DIPSTICK): NEGATIVE MG/DL
KETONES (URINE DIPSTICK): NEGATIVE MG/DL
MULTISTIX LOT#: ABNORMAL NUMERIC
NITRITE, URINE: NEGATIVE
PH, URINE: 7 (ref 4.5–8)
PROTEIN (URINE DIPSTICK): NEGATIVE MG/DL
SPECIFIC GRAVITY: 1.01 (ref 1–1.03)
URINE-COLOR: YELLOW
UROBILINOGEN,SEMI-QN: 0.2 MG/DL (ref 0–1.9)

## 2024-12-11 PROCEDURE — 99024 POSTOP FOLLOW-UP VISIT: CPT | Performed by: UROLOGY

## 2024-12-11 PROCEDURE — 52310 CYSTOSCOPY AND TREATMENT: CPT | Performed by: UROLOGY

## 2024-12-11 PROCEDURE — 81003 URINALYSIS AUTO W/O SCOPE: CPT | Performed by: UROLOGY

## 2024-12-11 RX ORDER — SULFAMETHOXAZOLE AND TRIMETHOPRIM 800; 160 MG/1; MG/1
1 TABLET ORAL ONCE
Status: COMPLETED | OUTPATIENT
Start: 2024-12-11 | End: 2024-12-11

## 2024-12-11 RX ADMIN — SULFAMETHOXAZOLE AND TRIMETHOPRIM 1 TABLET: 800; 160 TABLET ORAL at 09:30:00

## 2025-01-31 ENCOUNTER — LAB ENCOUNTER (OUTPATIENT)
Dept: LAB | Age: 65
End: 2025-01-31
Attending: UROLOGY
Payer: MEDICARE

## 2025-01-31 DIAGNOSIS — N20.0 KIDNEY STONE: ICD-10-CM

## 2025-01-31 LAB
CALCIUM BLD-MCNC: 9.8 MG/DL (ref 8.7–10.6)
PTH-INTACT SERPL-MCNC: 41 PG/ML (ref 18.5–88)
URATE SERPL-MCNC: 3.9 MG/DL

## 2025-01-31 PROCEDURE — 82310 ASSAY OF CALCIUM: CPT

## 2025-01-31 PROCEDURE — 84550 ASSAY OF BLOOD/URIC ACID: CPT

## 2025-01-31 PROCEDURE — 83970 ASSAY OF PARATHORMONE: CPT

## 2025-01-31 PROCEDURE — 36415 COLL VENOUS BLD VENIPUNCTURE: CPT

## 2025-02-11 PROBLEM — D12.0 BENIGN NEOPLASM OF CECUM: Status: ACTIVE | Noted: 2025-02-11

## 2025-02-11 PROBLEM — R19.7 DIARRHEA: Status: ACTIVE | Noted: 2025-02-11

## 2025-02-11 PROBLEM — Z12.11 SPECIAL SCREENING FOR MALIGNANT NEOPLASM OF COLON: Status: ACTIVE | Noted: 2025-02-11

## 2025-02-11 PROBLEM — R19.4 CHANGE IN BOWEL HABITS: Status: ACTIVE | Noted: 2025-02-11

## 2025-02-24 DIAGNOSIS — J44.9 CHRONIC OBSTRUCTIVE PULMONARY DISEASE, UNSPECIFIED COPD TYPE (HCC): ICD-10-CM

## 2025-02-24 RX ORDER — ATORVASTATIN CALCIUM 10 MG/1
10 TABLET, FILM COATED ORAL NIGHTLY
Qty: 90 TABLET | Refills: 0 | Status: SHIPPED | OUTPATIENT
Start: 2025-02-24

## 2025-02-24 RX ORDER — ALBUTEROL SULFATE 90 UG/1
2 INHALANT RESPIRATORY (INHALATION) EVERY 4 HOURS PRN
Qty: 8.5 G | Refills: 0 | Status: SHIPPED | OUTPATIENT
Start: 2025-02-24

## 2025-02-24 NOTE — TELEPHONE ENCOUNTER
Asthma & COPD Medication Protocol [id number]/24/2025 03:05 PM   Protocol Details Appointment in past 6 or next 3 months    Medication is active on med list      Last office visit 8/5/24  Last refilled on 12/4/24 for # 1 with 0 refills  Future Appointments   Date Time Provider Department Center   3/4/2025  3:00 PM Oliver Pierre MD [facility] [place]   4/9/2025  9:30 AM Joselo Dunn MD [id number]        Thank you.

## 2025-03-03 ENCOUNTER — TELEPHONE (OUTPATIENT)
Dept: NEUROLOGY | Facility: CLINIC | Age: 65
End: 2025-03-03

## 2025-03-04 ENCOUNTER — OFFICE VISIT (OUTPATIENT)
Dept: NEUROLOGY | Facility: CLINIC | Age: 65
End: 2025-03-04
Payer: MEDICARE

## 2025-03-04 VITALS
BODY MASS INDEX: 26.87 KG/M2 | SYSTOLIC BLOOD PRESSURE: 120 MMHG | WEIGHT: 146 LBS | RESPIRATION RATE: 16 BRPM | DIASTOLIC BLOOD PRESSURE: 80 MMHG | OXYGEN SATURATION: 97 % | HEART RATE: 73 BPM | HEIGHT: 62 IN

## 2025-03-04 DIAGNOSIS — R41.3 MEMORY LOSS: Primary | ICD-10-CM

## 2025-03-04 PROCEDURE — G2211 COMPLEX E/M VISIT ADD ON: HCPCS | Performed by: OTHER

## 2025-03-04 PROCEDURE — 99214 OFFICE O/P EST MOD 30 MIN: CPT | Performed by: OTHER

## 2025-03-04 NOTE — PATIENT INSTRUCTIONS
Refill policies:    Allow 2-3 business days for refills; controlled substances may take longer.  Contact your pharmacy at least 5 days prior to running out of medication and have them send an electronic request or submit request through the “request refill” option in your [employer] account.  Refills are not addressed on weekends; covering physicians do not authorize routine medications on weekends.  No narcotics or controlled substances are refilled after noon on Fridays or by on call physicians.  By law, narcotics must be electronically prescribed.  A 30 day supply with no refills is the maximum allowed.  If your prescription is due for a refill, you may be due for a follow up appointment.  To best provide you care, patients receiving routine medications need to be seen at least once a year.  Patients receiving narcotic/controlled substance medications need to be seen at least once every 3 months.  In the event that your preferred pharmacy does not have the requested medication in stock (e.g. Backordered), it is your responsibility to find another pharmacy that has the requested medication available.  We will gladly send a new prescription to that pharmacy at your request.    Scheduling Tests:    If your physician has ordered radiology tests such as MRI or CT scans, please contact Central Scheduling at [phone] right away to schedule the test.  Once scheduled, the [facility] Centralized Referral Team will work with your insurance carrier to obtain pre-certification or prior authorization.  Depending on your insurance carrier, approval may take 3-10 days.  It is highly recommended patients assure they have received an authorization before having a test performed.  If test is done without insurance authorization, patient may be responsible for the entire amount billed.      Precertification and Prior Authorizations:  If your physician has recommended that you have a procedure or additional testing performed the [facility]  Centralized Referral Team will contact your insurance carrier to obtain pre-certification or prior authorization.    You are strongly encouraged to contact your insurance carrier to verify that your procedure/test has been approved and is a COVERED benefit.  Although the [facility] Centralized Referral Team does its due diligence, the insurance carrier gives the disclaimer that \"Although the procedure is authorized, this does not guarantee payment.\"    Ultimately the patient is responsible for payment.   Thank you for your understanding in this matter.  Paperwork Completion:  If you require FMLA or disability paperwork for your recovery, please make sure to either drop it off or have it faxed to our office at [phone]. Be sure the form has your name and date of birth on it.  The form will be faxed to our Forms Department and they will complete it for you.  There is a 25$ fee for all forms that need to be filled out.  Please be aware there is a 10-14 day turnaround time.  You will need to sign a release of information (ANDREY) form if your paperwork does not come with one.  You may call the Forms Department with any questions at [phone].  Their fax number is [phone].

## 2025-03-04 NOTE — PROGRESS NOTES
Neurology History & Physical     ASSESSMENT & PLAN:      ICD-10-CM    1. Memory loss  R41.3 MRI BRAIN (W+WO) (CPT=70553)     PSYCHOLOGY - INTERNAL        Cognitive dysfunction / Memory loss, unimproved.  Does have breast cancer history.  Exam in 2024 consistent with  Mild cognitive impairment (MoCA 17-25).  She has not had a chance to do MRI brain or neuropsych yet, I re-ordered.    I intend to be providing an ongoing, continuous, and active collaborative plan of care for the problem(s) above (the management of which require my specialized clinical knowledge, skill, and expertise).      Return in about 4 months (around 7/4/2025).       ~~~~~~~~~~~~~~~~~~~~~~~~~~~    CHIEF COMPLAINT / REASON FOR VISIT:    Chief Complaint   Patient presents with    Memory Loss     Patient is here following up on memory today no questions or concerns for DR       HISTORY OBTAINED FROM:  Patient and   Chart review    HISTORY:  Юлия Charles is a 64 year old female with 1.5-2 years of slowly progressive memory problems.  She repeats questions, forgets recent discussions.  LTM is relatively preserved.  (Had cerebellar tumor resected 10 years ago, not malignant.)  Lives with , daughter, granddaughter.  She has not forgotten bills or groceries.  No issues driving, not accidents.  Retired  (at age 14).    Does not snore.  Mood is good.  Does have mild tremor.    No limb numbness or weakness.  No vision or hearing changes.  No dizziness, no balance or gait issues.    No new headaches outside of her chronic headaches.      INTERIM HISTORY:  Memory is stable, though unimproved.    Driving status:  Driving    DATA REVIEWED:  As documented in the history    Sept 2024  Folate unremarkable     Aug 2024  FM APRN note   Head CT unremarkable (other than post op changes of suboccipital crani with right cerebellar encephalomalacia)  CBC WBC 14.59  CMP unremarkable     July 2024  TSH, FT4, B12 unremarkable     PHYSICAL  EXAMINATION:  /80   Pulse 73   Resp 16   Ht 62\"   Wt 146 lb (66.2 kg)   SpO2 97%   BMI 26.70 kg/m²     Allergies   Allergen Reactions    Amoxicillin NAUSEA ONLY     \"At least 20 years ago\"    Tamoxifen OTHER (SEE COMMENTS)     Very emotional         Current medications:   atorvastatin 10 MG Oral Tab TAKE 1 TABLET(10 MG) BY MOUTH EVERY NIGHT 90 tablet 0    ALBUTEROL 108 (90 Base) MCG/ACT Inhalation Aero Soln INHALE 2 PUFFS INTO THE LUNGS EVERY 4 HOURS AS NEEDED FOR WHEEZING OR SHORTNESS OF BREATH 8.5 g 0    Wheat Dextrin (BENEFIBER OR) Take by mouth as needed.      acetaminophen 325 MG Oral Tab Take 1 tablet (325 mg total) by mouth every 6 (six) hours as needed for Pain.         Past Medical History:    Arthritis    Back pain    Bloating    Blood in urine    Breast cancer, right breast (HCC)    Formatting of this note might be different from the original.  S/p b/l mastectomy  In . On Aromacin      Calculus of kidney    Change in hair    Constipation    COPD (chronic obstructive pulmonary disease) (HCC)    No oxygen    Diarrhea, unspecified    Eye disease    Fatigue    Frequent urination    High cholesterol    History of blood transfusion    about 20 years ago    Intracranial tumor (HCC)    Irregular bowel habits    Pain in joints    Personal history of antineoplastic chemotherapy    Last treatment     Stool incontinence    Stress    Visual impairment    glasses    Wears glasses       Past Surgical History:   Procedure Laterality Date    Brain surgery      removal brain tumor          x2    Colonoscopy      Hysterectomy      Removal of kidney stone      Removal of lung,lobectomy      Tonsillectomy      Wom w/ cydney mastec or hx of mastec         Social History     Socioeconomic History    Marital status:    Tobacco Use    Smoking status: Former     Current packs/day: 0.00     Types: Cigarettes     Quit date: 2018     Years since quittin.4    Smokeless tobacco: Never    Vaping Use    Vaping status: Never Used   Substance and Sexual Activity    Alcohol use: Not Currently    Drug use: Not Currently     Types: Cocaine     Comment: long time ago   Other Topics Concern    Caffeine Concern Yes     Comment: coffee occ, pepsi    Exercise Yes     Comment: walking       Family History   Problem Relation Age of Onset    Heart Disease Father     Colon Polyps Mother     Substance Abuse Son     Diabetes Sister     Heart Disease Sister        Oliver Pierre MD, FAES, FAAN  Board-Certified in Neurology, Epilepsy, and Clinical Neurophysiology  [facility]

## 2025-03-11 ENCOUNTER — HOSPITAL ENCOUNTER (OUTPATIENT)
Dept: MRI IMAGING | Age: 65
Discharge: HOME | End: 2025-03-11
Attending: Other
Payer: MEDICARE

## 2025-03-11 DIAGNOSIS — R41.3 MEMORY LOSS: ICD-10-CM

## 2025-03-11 PROCEDURE — 70553 MRI BRAIN STEM W/O & W/DYE: CPT | Performed by: OTHER

## 2025-03-11 PROCEDURE — A9575 INJ GADOTERATE MEGLUMI 0.1ML: HCPCS | Performed by: OTHER

## 2025-03-11 RX ORDER — GADOTERATE MEGLUMINE 376.9 MG/ML
20 INJECTION INTRAVENOUS
Status: COMPLETED | OUTPATIENT
Start: 2025-03-11 | End: 2025-03-11

## 2025-03-11 RX ADMIN — GADOTERATE MEGLUMINE 20 ML: 376.9 INJECTION INTRAVENOUS at 15:39:00

## 2025-03-12 ENCOUNTER — TELEPHONE (OUTPATIENT)
Dept: NEUROLOGY | Facility: CLINIC | Age: 65
End: 2025-03-12

## 2025-03-12 NOTE — TELEPHONE ENCOUNTER
Patient notified of MRI results and appointment scheduled with Dr Pierre on 3/18 at 4:00 pm in [place]. Patient states she did not have a head or neck injury or spinal tap within the last 6 months but states she does have daily headaches.     Olvier Pierre MD  3/11/2025  4:52 PM CDT       [facility],     Could we let her know that her brain MRI is abnormal?  It is a bit of an unusual finding, can be due to low pressure in the brain.  Has she had any headaches or head/neck injury (or a spinal tap) in the past 6 months?  Could we schedule her in a return slot to discuss in detail within the next 1-2 weeks?

## 2025-03-18 ENCOUNTER — OFFICE VISIT (OUTPATIENT)
Dept: NEUROLOGY | Facility: CLINIC | Age: 65
End: 2025-03-18
Payer: MEDICARE

## 2025-03-18 VITALS
BODY MASS INDEX: 26.68 KG/M2 | WEIGHT: 145 LBS | OXYGEN SATURATION: 97 % | HEIGHT: 62 IN | SYSTOLIC BLOOD PRESSURE: 128 MMHG | DIASTOLIC BLOOD PRESSURE: 72 MMHG | RESPIRATION RATE: 14 BRPM | HEART RATE: 80 BPM

## 2025-03-18 DIAGNOSIS — G03.9 PACHYMENINGITIS (HCC): Primary | ICD-10-CM

## 2025-03-18 DIAGNOSIS — R41.3 MEMORY LOSS: ICD-10-CM

## 2025-03-18 DIAGNOSIS — C80.1 MALIGNANCY (HCC): ICD-10-CM

## 2025-03-18 PROCEDURE — 99215 OFFICE O/P EST HI 40 MIN: CPT | Performed by: OTHER

## 2025-03-18 PROCEDURE — G2211 COMPLEX E/M VISIT ADD ON: HCPCS | Performed by: OTHER

## 2025-03-18 NOTE — PATIENT INSTRUCTIONS
Instructions from Dr. 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  There are several causes of this that we should investigate.    Please have a CT scan of chest/abdomen/pelvis  Please have blood work done today  We will think about doing a spinal tap - let me think about it and get back to you about that.  Please go ahead with memory (2-3 hour) test  See me in 6 weeks.    ~~~~~~~~~~~~~~~~~~~~~~~     Refill policies:    Allow 2-3 business days for refills; controlled substances may take longer.  Contact your pharmacy at least 5 days prior to running out of medication and have them send an electronic request or submit request through the “request refill” option in your [employer] account.  Refills are not addressed on weekends; covering physicians do not authorize routine medications on weekends.  No narcotics or controlled substances are refilled after noon on Fridays or by on call physicians.  By law, narcotics must be electronically prescribed.  A 30 day supply with no refills is the maximum allowed.  If your prescription is due for a refill, you may be due for a follow up appointment.  To best provide you care, patients receiving routine medications need to be seen at least once a year.  Patients receiving narcotic/controlled substance medications need to be seen at least once every 3 months.  In the event that your preferred pharmacy does not have the requested medication in stock (e.g. Backordered), it is your responsibility to find another pharmacy that has the requested medication available.  We will gladly send a new prescription to that pharmacy at your request.    Scheduling Tests:    If your physician has ordered radiology tests such as MRI or CT scans, please contact Central Scheduling at [phone] right away to schedule the test.  Once scheduled, the [facility] Centralized  Referral Team will work with your insurance carrier to obtain pre-certification or prior authorization.  Depending on your insurance carrier, approval may take 3-10 days.  It is highly recommended patients assure they have received an authorization before having a test performed.  If test is done without insurance authorization, patient may be responsible for the entire amount billed.      Precertification and Prior Authorizations:  If your physician has recommended that you have a procedure or additional testing performed the [facility] Centralized Referral Team will contact your insurance carrier to obtain pre-certification or prior authorization.    You are strongly encouraged to contact your insurance carrier to verify that your procedure/test has been approved and is a COVERED benefit.  Although the [facility] Centralized Referral Team does its due diligence, the insurance carrier gives the disclaimer that \"Although the procedure is authorized, this does not guarantee payment.\"    Ultimately the patient is responsible for payment.   Thank you for your understanding in this matter.  Paperwork Completion:  If you require FMLA or disability paperwork for your recovery, please make sure to either drop it off or have it faxed to our office at [phone]. Be sure the form has your name and date of birth on it.  The form will be faxed to our Forms Department and they will complete it for you.  There is a 25$ fee for all forms that need to be filled out.  Please be aware there is a 10-14 day turnaround time.  You will need to sign a release of information (ANDREY) form if your paperwork does not come with one.  You may call the Forms Department with any questions at [phone].  Their fax number is [phone].

## 2025-03-18 NOTE — PROGRESS NOTES
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 Immunoglobulin G, Subclasses (1-4)     XR LUMBAR PUNCTURE  (CPT=62270,86020)     Cell Count, CSF     Glucose, Cerebrospinal Fluid     Protein Electrophoresis, CSF     Protein, Total, Csf     Angiotensin Convert Enz., CSF     Vdrl, CSF     CSF culture     Cytology, fluids     C-Reactive Protein     Sed Rate, Westergren (Automated)     FUNGUS CULTURE AND SMEAR     AFB CULTURE AND SMEAR     Cryptococcus Antigen, CSF     ANCA Panel Vasculitis     CT CHEST+ABDOMEN+PELVIS(ALL CNTRST ONLY)(CPT=71260/32043)     Leukemia Lymphoma Flow Nonbld     Autoimmune Neurology/Comprehensive Paraneoplastic Profile      2. Malignancy (HCC)  C80.1 Cytology, fluids     CT CHEST+ABDOMEN+PELVIS(ALL CNTRST ONLY)(CPT=71260/73061)     Leukemia Lymphoma Flow Nonbld     Autoimmune Neurology/Comprehensive Paraneoplastic Profile      3. Memory loss  R41.3 Autoimmune Neurology/Comprehensive Paraneoplastic Profile          Diffuse pachymeningeal enhancement on MRI brain.  Cause unknown, could post threat to bodily function.  No trauma or suspicion of SVT or SDH or leak / hypotension.  Inflammatory or neoplastic causes possible.  Obtain AI profile, IgG4 Ab, ESR, CRP.  Obtain CT C/A/P for malginancy and granulomatous disease.    Will d/w NSGY, and consider LP with CSF ACE, cyto and flow, VDRL, fungal and TB and crypto.  Does have breast cancer and cerebellar tumor history.    Cognitive dysfunction / Memory loss, unimproved.  Does have breast cancer history.  Exam in 2024 consistent with  Mild cognitive impairment (MoCA 17-25).  Neuropsych pending (already scheduled in April).  Obtain AI Neurology panel.    I intend to be providing an ongoing, continuous, and active collaborative plan of care for the problem(s) above (the management of which require my specialized clinical knowledge, skill, and expertise).      Return in about 6 weeks (around  4/29/2025).       ~~~~~~~~~~~~~~~~~~~~~~~~~~~    CHIEF COMPLAINT / REASON FOR VISIT:    Chief Complaint   Patient presents with    Follow - Up     LOV 3/4/25 for memory loss.  MRI Brain done 3/11/25     HISTORY OBTAINED FROM:  Patient and   Chart review    HISTORY:  Юлия Charles is a 64 year old female with 1.5-2 years of slowly progressive memory problems.  She repeats questions, forgets recent discussions.  LTM is relatively preserved.  (Had cerebellar tumor resected 10 years ago, not malignant.)  Lives with , daughter, granddaughter.  She has not forgotten bills or groceries.  No issues driving, not accidents.  Retired  (at age 14).    Does not snore.  Mood is good.  Does have mild tremor.    No limb numbness or weakness.  No vision or hearing changes.  No dizziness, no balance or gait issues.    No new headaches outside of her chronic headaches.      INTERIM HISTORY:  Memory is stable, though unimproved.  Has headaches chronically that are unchanged, not positional.  No vision or hearing changes.      Had bilat mastectomy for breast cancer in 2008 (no chemo, no radiation).    Has cerebellar tumor resected around 2007.    Driving status:  Driving    DATA REVIEWED:  As documented in the history    March 2025  MRI brain   \"1. Sequelae of right suboccipital craniectomy along with resection cavity in volume loss in the right cerebellar hemisphere is noted.   2. Marked pachymeningeal enhancement throughout the brain parenchyma is noted.  This may be sequelae of intracranial hypotension.  This can also be due to sequelae of chronic infectious or inflammatory process.  Diffuse carcinomatosis cannot be excluded   though is considered unlikely. \"   (I independently analyzed and interpreted the above, and I agree with the reading physician report(s).)    Oct 2024  HFP unremarkable   CK unremarkable   LDL 92    Sept 2024  Folate unremarkable     Aug 2024  FM APRN note   Head CT unremarkable  (other than post op changes of suboccipital crani with right cerebellar encephalomalacia)  CBC WBC 14.59  CMP unremarkable     2024  TSH, FT4, B12 unremarkable     PHYSICAL EXAMINATION:  /72   Pulse 80   Resp 14   Ht 62\"   Wt 145 lb (65.8 kg)   SpO2 97%   BMI 26.52 kg/m²     Allergies   Allergen Reactions    Amoxicillin NAUSEA ONLY     \"At least 20 years ago\"    Tamoxifen OTHER (SEE COMMENTS)     Very emotional         Current medications:   atorvastatin 10 MG Oral Tab TAKE 1 TABLET(10 MG) BY MOUTH EVERY NIGHT 90 tablet 0    ALBUTEROL 108 (90 Base) MCG/ACT Inhalation Aero Soln INHALE 2 PUFFS INTO THE LUNGS EVERY 4 HOURS AS NEEDED FOR WHEEZING OR SHORTNESS OF BREATH 8.5 g 0    Wheat Dextrin (BENEFIBER OR) Take by mouth as needed.      acetaminophen 325 MG Oral Tab Take 1 tablet (325 mg total) by mouth every 6 (six) hours as needed for Pain.         Past Medical History:    Arthritis    Back pain    Bloating    Blood in urine    Breast cancer, right breast (HCC)    Formatting of this note might be different from the original.  S/p b/l mastectomy  In . On Aromacin      Calculus of kidney    Change in hair    Constipation    COPD (chronic obstructive pulmonary disease) (HCC)    No oxygen    Diarrhea, unspecified    Eye disease    Fatigue    Frequent urination    High cholesterol    History of blood transfusion    about 20 years ago    Intracranial tumor (HCC)    Irregular bowel habits    Pain in joints    Personal history of antineoplastic chemotherapy    Last treatment     Stool incontinence    Stress    Visual impairment    glasses    Wears glasses       Past Surgical History:   Procedure Laterality Date    Brain surgery      removal brain tumor          x2    Colonoscopy      Hysterectomy      Removal of kidney stone      Removal of lung,lobectomy      Tonsillectomy      Wom w/ cydney mastec or hx of mastec         Social History     Socioeconomic History    Marital status:     Tobacco Use    Smoking status: Former     Current packs/day: 0.00     Types: Cigarettes     Quit date: 2018     Years since quittin.5    Smokeless tobacco: Never   Vaping Use    Vaping status: Never Used   Substance and Sexual Activity    Alcohol use: Not Currently    Drug use: Not Currently     Types: Cocaine     Comment: long time ago   Other Topics Concern    Caffeine Concern Yes     Comment: coffee occ, pepsi    Exercise Yes     Comment: walking       Family History   Problem Relation Age of Onset    Heart Disease Father     Colon Polyps Mother     Substance Abuse Son     Diabetes Sister     Heart Disease Sister        Oliver Pierre MD, FAES, FAAN  Board-Certified in Neurology, Epilepsy, and Clinical Neurophysiology  [facility]

## 2025-03-19 DIAGNOSIS — G96.00 CSF LEAK: ICD-10-CM

## 2025-03-19 DIAGNOSIS — G03.9 PACHYMENINGITIS (HCC): Primary | ICD-10-CM

## 2025-03-25 ENCOUNTER — HOSPITAL ENCOUNTER (OUTPATIENT)
Dept: MRI IMAGING | Facility: HOSPITAL | Age: 65
Discharge: HOME | End: 2025-03-25
Attending: Other
Payer: MEDICARE

## 2025-03-25 DIAGNOSIS — G96.00 CSF LEAK: ICD-10-CM

## 2025-03-25 DIAGNOSIS — G03.9 PACHYMENINGITIS (HCC): ICD-10-CM

## 2025-03-25 PROCEDURE — 72158 MRI LUMBAR SPINE W/O & W/DYE: CPT | Performed by: OTHER

## 2025-03-25 PROCEDURE — 72156 MRI NECK SPINE W/O & W/DYE: CPT | Performed by: OTHER

## 2025-03-25 PROCEDURE — A9575 INJ GADOTERATE MEGLUMI 0.1ML: HCPCS | Performed by: OTHER

## 2025-03-25 PROCEDURE — 72157 MRI CHEST SPINE W/O & W/DYE: CPT | Performed by: OTHER

## 2025-03-25 RX ORDER — GADOTERATE MEGLUMINE 376.9 MG/ML
15 INJECTION INTRAVENOUS
Status: COMPLETED | OUTPATIENT
Start: 2025-03-25 | End: 2025-03-25

## 2025-03-25 RX ORDER — GADOTERATE MEGLUMINE 376.9 MG/ML
20 INJECTION INTRAVENOUS
Status: DISCONTINUED | OUTPATIENT
Start: 2025-03-25 | End: 2025-03-25

## 2025-03-25 RX ADMIN — GADOTERATE MEGLUMINE 14 ML: 376.9 INJECTION INTRAVENOUS at 14:20:00

## 2025-04-03 ENCOUNTER — OFFICE VISIT (OUTPATIENT)
Dept: SURGERY | Facility: CLINIC | Age: 65
End: 2025-04-03
Payer: MEDICARE

## 2025-04-03 ENCOUNTER — TELEPHONE (OUTPATIENT)
Dept: SURGERY | Facility: CLINIC | Age: 65
End: 2025-04-03

## 2025-04-03 VITALS
SYSTOLIC BLOOD PRESSURE: 122 MMHG | DIASTOLIC BLOOD PRESSURE: 64 MMHG | WEIGHT: 145 LBS | BODY MASS INDEX: 26.68 KG/M2 | HEIGHT: 62 IN | OXYGEN SATURATION: 97 % | HEART RATE: 74 BPM

## 2025-04-03 DIAGNOSIS — M54.12 CERVICAL MYELOPATHY WITH CERVICAL RADICULOPATHY (HCC): Primary | ICD-10-CM

## 2025-04-03 DIAGNOSIS — G95.9 CERVICAL MYELOPATHY WITH CERVICAL RADICULOPATHY (HCC): Primary | ICD-10-CM

## 2025-04-03 PROCEDURE — 99205 OFFICE O/P NEW HI 60 MIN: CPT | Performed by: NEUROLOGICAL SURGERY

## 2025-04-03 RX ORDER — MELOXICAM 7.5 MG/1
7.5 TABLET ORAL DAILY
Qty: 30 TABLET | Refills: 1 | Status: SHIPPED | OUTPATIENT
Start: 2025-04-03

## 2025-04-03 NOTE — PATIENT INSTRUCTIONS
Refill policies:    Allow 2-3 business days for refills; controlled substances may take longer.  Contact your pharmacy at least 5 days prior to running out of medication and have them send an electronic request or submit request through the “request refill” option in your [employer] account.  Refills are not addressed on weekends; covering physicians do not authorize routine medications on weekends.  No narcotics or controlled substances are refilled after noon on Fridays or by on call physicians.  By law, narcotics must be electronically prescribed.  A 30 day supply with no refills is the maximum allowed.  If your prescription is due for a refill, you may be due for a follow up appointment.  To best provide you care, patients receiving routine medications need to be seen at least once a year.  Patients receiving narcotic/controlled substance medications need to be seen at least once every 3 months.  In the event that your preferred pharmacy does not have the requested medication in stock (e.g. Backordered), it is your responsibility to find another pharmacy that has the requested medication available.  We will gladly send a new prescription to that pharmacy at your request.    Scheduling Tests:    If your physician has ordered radiology tests such as MRI or CT scans, please contact Central Scheduling at [phone] right away to schedule the test.  Once scheduled, the [facility] Centralized Referral Team will work with your insurance carrier to obtain pre-certification or prior authorization.  Depending on your insurance carrier, approval may take 3-10 days.  It is highly recommended patients assure they have received an authorization before having a test performed.  If test is done without insurance authorization, patient may be responsible for the entire amount billed.      Precertification and Prior Authorizations:  If your physician has recommended that you have a procedure or additional testing performed the [facility]  Centralized Referral Team will contact your insurance carrier to obtain pre-certification or prior authorization.    You are strongly encouraged to contact your insurance carrier to verify that your procedure/test has been approved and is a COVERED benefit.  Although the [facility] Centralized Referral Team does its due diligence, the insurance carrier gives the disclaimer that \"Although the procedure is authorized, this does not guarantee payment.\"    Ultimately the patient is responsible for payment.   Thank you for your understanding in this matter.  Paperwork Completion:  If you require FMLA or disability paperwork for your recovery, please make sure to either drop it off or have it faxed to our office at [phone]. Be sure the form has your name and date of birth on it.  The form will be faxed to our Forms Department and they will complete it for you.  There is a 25$ fee for all forms that need to be filled out.  Please be aware there is a 10-14 day turnaround time.  You will need to sign a release of information (ANDREY) form if your paperwork does not come with one.  You may call the Forms Department with any questions at [phone].  Their fax number is [phone].       You are scheduled for C4-C7 ACDF and Ligation of  Shunt on 5.20.25 with  and Dr Reed at [facility].    You will need to contact the Pre-admission department at [phone] to schedule your pre-op testing.  They will get you scheduled for all the blood work, MRSA/MSSA, chest xray and EKG if needed. If they do not answer when you call, please leave a message and they will call you back, they return calls in surgical order, it may be a few days before they return your call.    PCP clearance is needed.  We have faxed a clearance request to Dr. Louise 's office.  Please contact their office for appointment.     Do not take any blood thinning medications, over the counter non-steroidal anti inflammatories (ibuprofen, advil, aleve  etc.), Meloxicam, herbal supplements (garlic,tumeric etc.), vitamin E, fish oil or krill oil for at least 7-14 days prior to surgery.     If you were on blood thinners (such as Coumadin, Plavix, Pradaxa, Xarelto, etc) prior to surgery that we had you stop for surgery, you will need to be cleared by your cardiologist to hold the medication prior to surgery.  Please make sure you get instructions about when to resume the medication before you are discharged from the hospital.    You may only take Tylenol, Extra Strength Tylenol, Arthritis Tylenol, or prescription Norco or Tramadol for pain if something is needed.    You should have nothing to eat or drink after 11:00pm the night prior to surgery except the following:    Do drink 12 ounces of regular Gatorade (NOT RED) 12 hours and 4 hours prior to your scheduled surgery time, Do not drink any other liquids (including water) before your surgery. Do not chew gum or eat candies before surgery.  Take 1000 mg of Tylenol (Acetaminophen) 4 hours before your scheduled surgery time, take this with your scheduled Gatorade.    Surgery is usually scheduled as a 1 day admission.  This is an estimate and varies from person to person.  Ultimately, the surgeon will determine when you are ready to be discharged.    Our office will get authorization for surgery. We will attempt to contact you 3 days before surgery if we run into any complications or have not received your surgery authorization. We will continue to attempt to get authorization until 2pm the day before surgery. If authorization is not received we will give you a call to discuss next steps. Our goal is to make sure you do not proceed with an un-authorized surgery so that you do not end up having to pay for this surgery out of pocket.     The hospital will contact you 1-2 days before surgery with your expected arrival time.     You may need an Aspen cervical collar- Rinella (A Rep will assists in setting these up if  ordered and authorized). Your Rep will provide you with their contact information in case you have any questions about the device or it's use.     You may need an external bone growth stimulator. OrthoFix If ordered by your surgery a rep will contact you either before or after your surgery. Your Rep will provide you with their contact information in case you have any questions about the device or it's use.     To prevent infection post-operatively, you will need to shower with Hibiclens soap for 5 days prior to surgery. The last shower should be the night before surgery.  Hibiclens soap can be found at any pharmacy in the first-aid section.  See detailed instructions below.    Per Dr. Gutierrez's surgery protocol your appointments are as follows:     2 week pre-op surgical telehealth appointment is on 5.6.25 at 9:00 am with the Spine Navigator RN     1 week pre-op surgical review scheduled on 5.14.25 at 1:40 pm with Dr. Gutierrez.     1 week post-op appointment scheduled on 5.28.25 at 2:30 pm with MILLIE Lawson     4 week post-op appointment scheduled on 6.18.25 at 2:30 pm with Dr. Gutierrez     3 month post surgery appointment scheduled on tbd at tbd with MILLIE Lawson    Please make sure to arrive at least 15 minutes prior to your scheduled appointment in order to get checked in and roomed in a timely manner.  Depending on provider availability, late patients may be required to reschedule.  REFILLS:  After surgery, please remember that we do have a 48 hour refill policy that does not include weekends, please make sure to request your medications in a timely manner so that you do not go days without medication.  *Refills should be requested through your pharmacy or through the refill request in [employer] (log in, go to medications, then select refill request).  "[employer] MESSAGING:  Please remember that our office is closed during the weekend and no one is available for [employer] messages. If you have an urgent or emergent  matter please go to a walk-in center or the emergency room. Also please remember your [employer]ineForm messages are part of your legal medical record and should not be utilized as a personal email with our providers as it is visible to all [facility] employees. Also, Since [employer] messages are not for emergent matters it may be several days before there is a response to your message.  FMLA/PAPERWORK COMPLETED BY OUR MEDICAL FORMS DEPARTMENT:    If you require FMLA paperwork for your surgery, please make sure to either Drop it off or have it faxed to our office at [phone]. Make sure it has your NAME, , and has your signature. You will need to have a Release of Information on file. To facilitate this process we ask that you requested it at the  on your way out and sign it. Without a signed ANDREY or signature on the form we will not be able to fax it and this will cause a delay with your forms. Fees charged for forms are $25 for initial submission and $15 for recertifications. If you have questions on the status of your forms please call the forms department at [phone].  **We do have a 3 week policy for all forms and paperwork, please make sure to allow plenty of time for completion. Same day paperwork will not be completed. **    Spine Navigator  You will have a 30 minute telehealth visit with our spine navigator approximately 2 weeks before your surgery. This visit is NOT optional. This appointment will get booked when you schedule your spinal surgery.  When speaking with Pre-admissions you will be told about a spine class as it relates to your surgery, this is an OPTIONAL class. The same or similar information will be discussed with you during your telehealth appointment with the spine navigator (Spine Navigator appointment is not optional). However, if you would like to have this information repeated to you or if you would feel more comfortable with additional information, you can elect to schedule  this class during your pre-admissions phone call.  The Pre-op Spine Surgery Class is held at [facility] most Wednesdays from 3:30-4:30 pm. Call Pre-admission Testing (PAT) to register at:  [phone]. Please park in the [facility] Parking garage, check in at registration and meet in the [facility] lobby for your escort to class on the Ortho Spine unit. If unable to attend, but would like additional information, the class is available online at [email or web address].     Due to COVID, visitor guidelines are constantly changing.  Please visit the following website for the detailed and up-to-date visitor screening and restriction policy at [email or web address].    Hibiclens Bathing  Hibiclens is a body soap that is used before surgery protect you from getting an infection after surgery   Hibiclens comes in a large blue bottle and can be found in most pharmacies in the First Aid supplies  Shower with this daily for FIVE consecutive days before surgery, using the entire bottle over the five days.  The last shower should be the night before surgery.   Steps to bathing with Hibiclens  Do not use Hibiclens on your hair, face or private areas  Wash your hair and face as normal with your usual cleansers  Rinse well  Using a clean wet washcloth apply enough Hibiclens to cover your body. Wash from the neck down avoiding the genital areas and concentrating on the surgical area  Rinse well  Dry yourself with a clean, dry towel  Do not use any powders, creams, lotions or sprays on your body as these attract bacteria  Deodorant and facial creams are acceptable.   (Laundering/cleaning: Chlorhexidine gluconate skin cleansers will cause stains if used with chlorine releasing products. Rinse completely and use only non-chlorine detergents.)  Meliza- surgery scheduler  For Office Use Only:  Medical Clearance Requested: PCP  PA: MEDICARE  CPT Codes:

## 2025-04-03 NOTE — PROGRESS NOTES
The following individual(s) verbally consented to be recorded using ambient AI listening technology and understand that they can each withdraw their consent to this listening technology at any point by asking the clinician to turn off or pause the recording:    Patient name: Юлия Charles

## 2025-04-03 NOTE — H&P
[facility]  Neurological Surgery Clinic Note    Юлия Charles  4/2/1960  [id number]  PCP: Patrizia Louise MD    REASON FOR VISIT:  Concern for intracranial hypotension    HISTORY OF PRESENT ILLNESS:  Юлия Charles is a 65 year old female who presents to clinic for evaluation of about a year of slowly progressive cognitive difficulty and headaches.  She reports that the headaches are not positional and have remained stable for a long time.  She has a history of a posterior fossa tumor resection back in 2008 in [place] that she reports was benign.  At that time she reports that she had a CSF leak through her wound and a lumboperitoneal shunt was placed at that time, which has not been operated upon since then.  She does also have a history of malignancy.  She also reports progressive balance difficulty and hand weakness.  MRI brain w/wo 3/11/2025 demonstrates diffuse pachymeningeal enhancement, venous engorgement, decreased prepontine cistern, decreased suprasellar cistern, and decrease mamillary-pontine distance.  MRI entire spine with and without contrast 3/25/2025 demonstrates a CSF space adjacent to the prior right posterior fossa surgical site that extends to the rostral cervical spine, as well as significant multilevel cervical spinal cord compression due to spondylosis.  CT abdomen pelvis 9/13/2024 demonstrates continuity of lumboperitoneal shunt.    PAST MEDICAL HISTORY:  Past Medical History:    Arthritis    Back pain    Bloating    Blood in urine    Breast cancer, right breast ([facility])    Formatting of this note might be different from the original.  S/p b/l mastectomy  In 2008. On Aromacin      Calculus of kidney    Change in hair    Constipation    COPD (chronic obstructive pulmonary disease) ([facility])    No oxygen    Diarrhea, unspecified    Eye disease    Fatigue    Frequent urination    High cholesterol    History of blood transfusion    about 20 years ago     Intracranial tumor (HCC)    Irregular bowel habits    Pain in joints    Personal history of antineoplastic chemotherapy    Last treatment     Stool incontinence    Stress    Visual impairment    glasses    Wears glasses       PAST SURGICAL HISTORY:  Past Surgical History:   Procedure Laterality Date    Brain surgery  2007    removal brain tumor          x2    Colonoscopy      Hysterectomy      Removal of kidney stone      Removal of lung,lobectomy      Tonsillectomy      Wom w/ cydney mastec or hx of mastec         FAMILY HISTORY:  family history includes Colon Polyps in her mother; Diabetes in her sister; Heart Disease in her father and sister; Substance Abuse in her son.    SOCIAL HISTORY:   reports that she quit smoking about 6 years ago. Her smoking use included cigarettes. She has never used smokeless tobacco. She reports that she does not currently use alcohol. She reports that she does not currently use drugs after having used the following drugs: Cocaine.    ALLERGIES:  Allergies[1]    MEDICATIONS:  Medications Ordered Prior to Encounter[2]    REVIEW OF SYSTEMS:  A 10-point system was reviewed.  Pertinent positives and negatives are noted in HPI.      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  We reviewed the imaging other.  I explained that given the concern for intracranial hypotension in  the setting of a possibly patent lumboperitoneal shunt, that we should consider ligating her lumboperitoneal shunt as she likely does not need it at this time.  We discussed the risks, benefits, alternatives, goals, and expectations of lumboperitoneal shunt ligation.  I did explain that there is the possibility that she may develop worsening of the small cranial pseudomeningocele at which time that may require primary repair.  I explained that this may or may not address her headaches or memory difficulty, however given her MRI findings I am concerned that she does have intracranial hypotension.  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  My partner Dr. Gutierrez recently operated on her  and she would be interested in seeing him for further evaluation.  I explained that we could consider performing surgery at the same time if he feels that this is necessary.  After this discussion and answering her questions, she expressed understanding agreement with the plan.    Plan:  Dr. Gutierrez was able to see the patient today and they plan on doing a multilevel anterior cervical discectomy and fusion and we will plan to perform a lumboperitoneal shunt ligation at the time of that surgery which is planned for May 20th    Amadeo Reed MD  Neurological Surgery  [facility] Time: 60 min including face to face time, chart review, imaging interpretation, and coordination of care         [1]   Allergies  Allergen Reactions    Amoxicillin NAUSEA ONLY     \"At least 20 years ago\"    Tamoxifen OTHER (SEE COMMENTS)     Very emotional     [2]   Current Outpatient Medications on File Prior to Visit   Medication Sig Dispense Refill    atorvastatin 10 MG Oral Tab TAKE 1 TABLET(10 MG) BY MOUTH EVERY NIGHT 90 tablet 0    ALBUTEROL 108 (90 Base) MCG/ACT Inhalation Aero Soln INHALE 2 PUFFS INTO THE LUNGS EVERY 4 HOURS AS  NEEDED FOR WHEEZING OR SHORTNESS OF BREATH 8.5 g 0    Wheat Dextrin (BENEFIBER OR) Take by mouth as needed.      acetaminophen 325 MG Oral Tab Take 1 tablet (325 mg total) by mouth every 6 (six) hours as needed for Pain.      phenazopyridine (PYRIDIUM) 100 MG Oral Tab Take 1 tablet (100 mg total) by mouth 3 (three) times daily as needed for Pain. This will turn your urine orange. (Patient not taking: Reported on 4/3/2025) 10 tablet 0    Polyethylene Glycol 3350 (MIRALAX) 17 g Oral Powd Pack Take 17 g by mouth daily as needed (Take to avoid constipation, especially if taking narcotic pain medications.). (Patient not taking: Reported on 3/4/2025) 20 packet 1     No current facility-administered medications on file prior to visit.

## 2025-04-04 ENCOUNTER — TELEPHONE (OUTPATIENT)
Dept: FAMILY MEDICINE CLINIC | Facility: CLINIC | Age: 65
End: 2025-04-04

## 2025-04-04 NOTE — TELEPHONE ENCOUNTER
Scheduled patient.  Future Appointments   Date Time Provider Department Center   4/9/2025  9:30 AM Joselo Dunn MD [id number]   4/29/2025  1:00 PM Patrizia Louise MD EMGOSW [facility] [place]   5/6/2025  9:00 AM SPINE NAVIGATOR Spine Center None   5/14/2025  1:40 PM YOVANA Gutierrez DO ENINAPER2 [facility] [place]   5/22/2025  1:40 PM Oliver Pierre MD ENINAPER [facility] [place]   5/28/2025  2:30 PM Alma Delia Patterson APRN ENINAPER2 [facility] [place]   6/18/2025 11:40 AM YOVANA Gutierrez DO ENINAPER2 [facility] [place]

## 2025-04-04 NOTE — TELEPHONE ENCOUNTER
You are scheduled for C4-C7 ACDF and Ligation of  Shunt on 5.20.25 with  and Dr Reed at [facility].    You will need to contact the Pre-admission department at [phone] to schedule your pre-op testing.  They will get you scheduled for all the blood work, MRSA/MSSA, chest xray and EKG if needed. If they do not answer when you call, please leave a message and they will call you back, they return calls in surgical order, it may be a few days before they return your call.    PCP clearance is needed.  We have faxed a clearance request to Dr. Louise 's office.  Please contact their office for appointment.     Do not take any blood thinning medications, over the counter non-steroidal anti inflammatories (ibuprofen, advil, aleve etc.), Meloxicam, herbal supplements (garlic,tumeric etc.), vitamin E, fish oil or krill oil for at least 7-14 days prior to surgery.     If you were on blood thinners (such as Coumadin, Plavix, Pradaxa, Xarelto, etc) prior to surgery that we had you stop for surgery, you will need to be cleared by your cardiologist to hold the medication prior to surgery.  Please make sure you get instructions about when to resume the medication before you are discharged from the hospital.    You may only take Tylenol, Extra Strength Tylenol, Arthritis Tylenol, or prescription Norco or Tramadol for pain if something is needed.    You should have nothing to eat or drink after 11:00pm the night prior to surgery except the following:    Do drink 12 ounces of regular Gatorade (NOT RED) 12 hours and 4 hours prior to your scheduled surgery time, Do not drink any other liquids (including water) before your surgery. Do not chew gum or eat candies before surgery.  Take 1000 mg of Tylenol (Acetaminophen) 4 hours before your scheduled surgery time, take this with your scheduled Gatorade.    Surgery is usually scheduled as a 1 day admission.  This is an estimate and varies from person to person.   Ultimately, the surgeon will determine when you are ready to be discharged.    Our office will get authorization for surgery. We will attempt to contact you 3 days before surgery if we run into any complications or have not received your surgery authorization. We will continue to attempt to get authorization until 2pm the day before surgery. If authorization is not received we will give you a call to discuss next steps. Our goal is to make sure you do not proceed with an un-authorized surgery so that you do not end up having to pay for this surgery out of pocket.     The hospital will contact you 1-2 days before surgery with your expected arrival time.     You may need an Aspen cervical collar- Rinella (A Rep will assists in setting these up if ordered and authorized). Your Rep will provide you with their contact information in case you have any questions about the device or it's use.     You may need an external bone growth stimulator. OrthoFix If ordered by your surgery a rep will contact you either before or after your surgery. Your Rep will provide you with their contact information in case you have any questions about the device or it's use.     To prevent infection post-operatively, you will need to shower with Hibiclens soap for 5 days prior to surgery. The last shower should be the night before surgery.  Hibiclens soap can be found at any pharmacy in the first-aid section.  See detailed instructions below.    Per Dr. Gutierrez's surgery protocol your appointments are as follows:     2 week pre-op surgical telehealth appointment is on 5.6.25 at 9:00 am with the Spine Navigator RN     1 week pre-op surgical review scheduled on 5.14.25 at 1:40 pm with Dr. Gutierrez.     1 week post-op appointment scheduled on 5.28.25 at 2:30 pm with MILLIE Lawson     4 week post-op appointment scheduled on 6.18.25 at 2:30 pm with Dr. Gutierrez     3 month post surgery appointment scheduled on tbd at tbd with Alma Delia Patterson  APRN    Please make sure to arrive at least 15 minutes prior to your scheduled appointment in order to get checked in and roomed in a timely manner.  Depending on provider availability, late patients may be required to reschedule.  REFILLS:  After surgery, please remember that we do have a 48 hour refill policy that does not include weekends, please make sure to request your medications in a timely manner so that you do not go days without medication.  *Refills should be requested through your pharmacy or through the refill request in [employer] (log in, go to medications, then select refill request).  [facility] MESSAGING:  Please remember that our office is closed during the weekend and no one is available for [employer] messages. If you have an urgent or emergent matter please go to a walk-in center or the emergency room. Also please remember your [employer] messages are part of your legal medical record and should not be utilized as a personal email with our providers as it is visible to all [facility] employees. Also, Since [employer] messages are not for emergent matters it may be several days before there is a response to your message.  FMLA/PAPERWORK COMPLETED BY OUR MEDICAL FORMS DEPARTMENT:    If you require FMLA paperwork for your surgery, please make sure to either Drop it off or have it faxed to our office at [phone]. Make sure it has your NAME, , and has your signature. You will need to have a Release of Information on file. To facilitate this process we ask that you requested it at the  on your way out and sign it. Without a signed ANDREY or signature on the form we will not be able to fax it and this will cause a delay with your forms. Fees charged for forms are $25 for initial submission and $15 for recertifications. If you have questions on the status of your forms please call the forms department at [phone].  **We do have a 3 week policy for all forms and paperwork, please make sure to  allow plenty of time for completion. Same day paperwork will not be completed. **    Spine Navigator  You will have a 30 minute telehealth visit with our spine navigator approximately 2 weeks before your surgery. This visit is NOT optional. This appointment will get booked when you schedule your spinal surgery.  When speaking with Pre-admissions you will be told about a spine class as it relates to your surgery, this is an OPTIONAL class. The same or similar information will be discussed with you during your telehealth appointment with the spine navigator (Spine Navigator appointment is not optional). However, if you would like to have this information repeated to you or if you would feel more comfortable with additional information, you can elect to schedule this class during your pre-admissions phone call.  The Pre-op Spine Surgery Class is held at [facility] most Wednesdays from 3:30-4:30 pm. Call Pre-admission Testing (PAT) to register at:  [phone]. Please park in the [facility] Parking garage, check in at registration and meet in the [facility] lobby for your escort to class on the Ortho Spine unit. If unable to attend, but would like additional information, the class is available online at [email or web address].     Due to COVID, visitor guidelines are constantly changing.  Please visit the following website for the detailed and up-to-date visitor screening and restriction policy at [email or web address].    Hibiclens Bathing  Hibiclens is a body soap that is used before surgery protect you from getting an infection after surgery   Hibiclens comes in a large blue bottle and can be found in most pharmacies in the First Aid supplies  Shower with this daily for FIVE consecutive days before surgery, using the entire bottle over the five days.  The last shower should be the night before surgery.   Steps to bathing with Hibiclens  Do not use Hibiclens on your hair,  face or private areas  Wash your hair and face as normal with your usual cleansers  Rinse well  Using a clean wet washcloth apply enough Hibiclens to cover your body. Wash from the neck down avoiding the genital areas and concentrating on the surgical area  Rinse well  Dry yourself with a clean, dry towel  Do not use any powders, creams, lotions or sprays on your body as these attract bacteria  Deodorant and facial creams are acceptable.   (Laundering/cleaning: Chlorhexidine gluconate skin cleansers will cause stains if used with chlorine releasing products. Rinse completely and use only non-chlorine detergents.)  Meliza- surgery scheduler  For Office Use Only:  Medical Clearance Requested: PCP  PA: MEDICARE  CPT Codes:

## 2025-04-04 NOTE — TELEPHONE ENCOUNTER
Patient having surgery 5/20/25 with Dr. Gutierrez  Please schedule patient for pre-op within 30 days of surgery

## 2025-04-07 PROCEDURE — 82507 ASSAY OF CITRATE: CPT

## 2025-04-07 PROCEDURE — 83735 ASSAY OF MAGNESIUM: CPT

## 2025-04-07 PROCEDURE — 81050 URINALYSIS VOLUME MEASURE: CPT

## 2025-04-07 PROCEDURE — 82340 ASSAY OF CALCIUM IN URINE: CPT

## 2025-04-07 PROCEDURE — 84300 ASSAY OF URINE SODIUM: CPT

## 2025-04-07 PROCEDURE — 83986 ASSAY PH BODY FLUID NOS: CPT

## 2025-04-07 PROCEDURE — 84560 ASSAY OF URINE/URIC ACID: CPT

## 2025-04-07 PROCEDURE — 84105 ASSAY OF URINE PHOSPHORUS: CPT

## 2025-04-07 PROCEDURE — 82436 ASSAY OF URINE CHLORIDE: CPT

## 2025-04-07 PROCEDURE — 84133 ASSAY OF URINE POTASSIUM: CPT

## 2025-04-07 PROCEDURE — 83945 ASSAY OF OXALATE: CPT

## 2025-04-07 PROCEDURE — 84392 ASSAY OF URINE SULFATE: CPT

## 2025-04-08 ENCOUNTER — LAB ENCOUNTER (OUTPATIENT)
Dept: LAB | Age: 65
End: 2025-04-08
Attending: UROLOGY
Payer: MEDICARE

## 2025-04-08 DIAGNOSIS — N20.0 KIDNEY STONE: ICD-10-CM

## 2025-04-08 NOTE — TELEPHONE ENCOUNTER
Patient is scheduled for C4-C7 ACDF and Ligation of  Shunt on 5.20.25 with  and Dr Reed at [facility].     Y pre-op apt scheduled (if sx is more than 30 days from last apt)  Y pre-op apt scheduled with RN spine navigator  Y Surgical instructions reviewed by nursing staff with patient  Y  form completed  Y Surgery order signed   Y Placed sx on surgery sheet  Y Placed on outlook calendar  [employer] message sent to patient with sx instructions  Y Faxed pre-op clearance request to PCP JAKI DE LUNA Faxed letter to prescribing provider requesting anticoagulants be held for surgery  Y E-mail sent to [employer]  Y Post-op appointments made  Y PA. Routed to PA team to initiate.  Y Post-Op outreach pt reminder placed.   Y Entire Neurosurgery Checklist Completed    Clearances: PCP  PA:MEDICARE [employer]   CPT Codes: 97758, 22552 X2, 69706, 43604, 28891, 23708, 78809, 99156, 76480

## 2025-04-09 ENCOUNTER — OFFICE VISIT (OUTPATIENT)
Dept: SURGERY | Facility: CLINIC | Age: 65
End: 2025-04-09

## 2025-04-09 DIAGNOSIS — N20.0 KIDNEY STONE: Primary | ICD-10-CM

## 2025-04-09 LAB
APPEARANCE: CLEAR
BILIRUBIN: NEGATIVE
GLUCOSE (URINE DIPSTICK): NEGATIVE MG/DL
KETONES (URINE DIPSTICK): NEGATIVE MG/DL
MULTISTIX LOT#: ABNORMAL NUMERIC
NITRITE, URINE: NEGATIVE
PH, URINE: 6.5 (ref 4.5–8)
PROTEIN (URINE DIPSTICK): NEGATIVE MG/DL
SPECIFIC GRAVITY: 1.02 (ref 1–1.03)
URINE-COLOR: YELLOW
UROBILINOGEN,SEMI-QN: 0.2 MG/DL (ref 0–1.9)

## 2025-04-09 PROCEDURE — 99214 OFFICE O/P EST MOD 30 MIN: CPT | Performed by: UROLOGY

## 2025-04-09 PROCEDURE — 81003 URINALYSIS AUTO W/O SCOPE: CPT | Performed by: UROLOGY

## 2025-04-09 PROCEDURE — G2211 COMPLEX E/M VISIT ADD ON: HCPCS | Performed by: UROLOGY

## 2025-04-09 NOTE — TELEPHONE ENCOUNTER
[facility] no prior authorization required.

## 2025-04-09 NOTE — PROGRESS NOTES
Urology Clinic Note    Primary Care Provider:  Patrizia Louise MD     Chief Complaint:     Nephrolithiasis    HPI:      Patient is a(n) 64 year old female with hx of right breast cx s/p b/l mastectomy in 2008. COPD, HLD, intracranial tumor s/p  shunt who presents for kidney stones fu sp recent sided ureteroscopy in September 2024, left-sided ureteroscopy in November 2024.       Pt recently had KUB done for constipation on 9/5/24. Right kidney: 1.3 x 1.7 cm right renal pelvis stone, numerous smaller stones in the mid lower pole of the right kidney. Left kidney: 13 x 7 mm mid lower pole, 9 x 12 mm lower pole, 6 x 4 mm midpole,  2 mm possible distal ureteral stone vs. Phlebolith.   She then saw our team for the above.  She had had some mild right-sided pain.  She had a history of urosepsis but never had kidney stones.  A CT scan was done and showed a very large 1.6 cm right renal pelvis stone, additional large stones on the left side.  She wanted to avoid bilateral surgery.  We therefore for started with the right side  Initial surgery 9/17/2024-very large stone treated.  Second look ureteroscopy on 10/3/2024.  All stone treated.    Stone: COM 40%, COD 60%     For her left-sided large lower pole stone burden, she opted for ureteroscopy.  She wanted to recover from the above before scheduling.     Ureteroscopy 11/11/24;   Findings:  Normal urethra. No bladder lesions. Left RPG with large lower pole filling defect; radiopaque stone.   Left URS with ~6mm stone in renal pelvis; fully treated. Lower pole with very large >1cm branching stone filling most of calyx- able to fragment this stone but unable to fully treat given angle into lower pole and some slight narrowing at the proximal ureteral which limited mobility. I was unable to reposition stone despite use of basket; tilting patient. Large stone burden remaining on final imaging.    6x24 JJ stent placed without issue.       I discussed IntraOp findings of very  challenging angle to the left lower pole and inability to fully treat stone.  We discussed options for PCNL vs repeat URS; she wanted repeat URS.      Taken to OR on 12/3;      Left RPG without hydronephrosis. Left ureteroscopy with narrow proximal ureter despite pre-stenting.  Stone fragments from previous surgery noted. Large lower pole stone now able to be access with patient being bumped on left side and in slight trendelenburg- all visible stone fragmented/dusted. Small area of residual radiopaque findings in lower pole c/w area of nephrocalcinosis- no visible stone in this area.   Left 6x24 JJ stent placed without issue.     Analysis was similar to the above.  Stent was later removed.  She is now here for follow-up.    Of note, patient having several neurologic issues.  She is having a large neurosurgical procedure in May.  24-hour urine was dropped off earlier this week, not yet available for review.  Her calcium, uric acid and PTH were normal.  Ultrasound was not done as patient had a CT chest abdomen pelvis ordered per PCP for workup of some abnormal findings on her last MRI brain.  She has not yet scheduled this.    She is otherwise doing well today.  She has no complaints.   Drinking more water.   No flank pain or hematuria.   UA without significant infection.        PSA:  No results found for: \"PSA\", \"PERCENTPSA\", \"PSAS\", \"PSAULTRA\", \"QPSA\", \"PSATOT\", \"TOTPSADX\", \"TOTPSASCREEN\"     History:     Past Medical History:    Arthritis    Back pain    Bloating    Blood in urine    Breast cancer, right breast ([facility])    Formatting of this note might be different from the original.  S/p b/l mastectomy  In 2008. On Aromacin      Calculus of kidney    Change in hair    Constipation    COPD (chronic obstructive pulmonary disease) ([facility])    No oxygen    Diarrhea, unspecified    Eye disease    Fatigue    Frequent urination    High cholesterol    History of blood transfusion    about 20 years ago    Intracranial tumor  (HCC)    Irregular bowel habits    Pain in joints    Personal history of antineoplastic chemotherapy    Last treatment     Stool incontinence    Stress    Visual impairment    glasses    Wears glasses       Past Surgical History:   Procedure Laterality Date    Brain surgery  2007    removal brain tumor          x2    Colonoscopy      Hysterectomy      Removal of kidney stone      Removal of lung,lobectomy      Tonsillectomy      Wom w/ cydney mastec or hx of mastec         Family History   Problem Relation Age of Onset    Heart Disease Father     Colon Polyps Mother     Substance Abuse Son     Diabetes Sister     Heart Disease Sister        Social History     Socioeconomic History    Marital status:    Tobacco Use    Smoking status: Former     Current packs/day: 0.00     Types: Cigarettes     Quit date: 2018     Years since quittin.5    Smokeless tobacco: Never   Vaping Use    Vaping status: Never Used   Substance and Sexual Activity    Alcohol use: Not Currently    Drug use: Not Currently     Types: Cocaine     Comment: long time ago   Other Topics Concern    Caffeine Concern Yes     Comment: coffee occ, pepsi    Exercise Yes     Comment: walking     Social Drivers of Health     Food Insecurity: Patient Declined (2024)    Received from [facility]    Hunger Vital Sign     Worried About Running Out of Food in the Last Year: Patient declined     Ran Out of Food in the Last Year: Patient declined   Transportation Needs: Patient Declined (2024)    Received from [facility]    PRAPARE - Transportation     Lack of Transportation (Medical): Patient declined     Lack of Transportation (Non-Medical): Patient declined   Stress: Patient Declined (2024)    Received from [facility] [employer] [facility] [employer] - Occupational Stress Questionnaire     Feeling of Stress :  Patient declined   Housing Stability: Patient Declined (8/25/2024)    [facility] from [facility]    Housing Stability Vital Sign     Unable to Pay for Housing in the Last Year: Patient declined     Number of Times Moved in the Last Year: 0     Homeless in the Last Year: Patient declined       Medications (Active prior to today's visit):  Current Outpatient Medications   Medication Sig Dispense Refill    Meloxicam 7.5 MG Oral Tab Take 1 tablet (7.5 mg total) by mouth daily. 30 tablet 1    atorvastatin 10 MG Oral Tab TAKE 1 TABLET(10 MG) BY MOUTH EVERY NIGHT 90 tablet 0    ALBUTEROL 108 (90 Base) MCG/ACT Inhalation Aero Soln INHALE 2 PUFFS INTO THE LUNGS EVERY 4 HOURS AS NEEDED FOR WHEEZING OR SHORTNESS OF BREATH 8.5 g 0    phenazopyridine (PYRIDIUM) 100 MG Oral Tab Take 1 tablet (100 mg total) by mouth 3 (three) times daily as needed for Pain. This will turn your urine orange. (Patient not taking: Reported on 4/3/2025) 10 tablet 0    Polyethylene Glycol 3350 (MIRALAX) 17 g Oral Powd Pack Take 17 g by mouth daily as needed (Take to avoid constipation, especially if taking narcotic pain medications.). (Patient not taking: Reported on 3/4/2025) 20 packet 1    Wheat Dextrin (BENEFIBER OR) Take by mouth as needed.      acetaminophen 325 MG Oral Tab Take 1 tablet (325 mg total) by mouth every 6 (six) hours as needed for Pain.         Allergies:  Allergies[1]    Review of Systems:   A comprehensive 10-point review of systems was completed.  Pertinent positives and negatives are noted in the the HPI.    Physical Exam:   CONSTITUTIONAL: Well developed, well nourished, in no acute distress  ABDOMEN: Soft, non-tender, non-distended     Assessment & Plan:   Nephrolithiasis     Patient with complicated kidney stone history as noted above.  We reviewed her blood work which is normal.  I will follow-up her 24-hour urine collection which was dropped off yesterday.  Discuss need for further imaging  with PCP (patient unsure if she needs CT or not)  Will re-order US at this time to assess for silent obstruction, large residual stone      I discussed general fluid and dietary guidelines to help prevent further stone formation including:    - Fluid consumption of preferably water to make 2-2.5  L/day of urine  - Low sodium consumption  - Adequate calcium consumption (approximately 1200 mg/day)  - Low fat and moderate animal protein consumption  - Limit consumption  of oxalate rich foods   - I encouraged increased dietary intake of citrate with lemon juice (4 oz day)        If the above workup is normal, we will plan for 6-month follow-up      In total, 30 minutes were spent on this patient encounter (including chart review, patient history, physical, and counseling, documentation, and communication).    Joselo Dunn MD  Staff Urologist  [facility]  Office: [phone]         [1]   Allergies  Allergen Reactions    Amoxicillin NAUSEA ONLY     \"At least 20 years ago\"    Tamoxifen OTHER (SEE COMMENTS)     Very emotional

## 2025-04-14 NOTE — TELEPHONE ENCOUNTER
Referred to [employer]  Dispense external bone growth stimulator    Face sheet, order and OV note faxed to:  [phone]

## 2025-04-14 NOTE — TELEPHONE ENCOUNTER
Referred to Jony Hollis LSO brace    Face sheet, order and OV note faxed to:  [phone]     no concerns

## 2025-04-15 ENCOUNTER — TELEPHONE (OUTPATIENT)
Dept: NEUROLOGY | Facility: CLINIC | Age: 65
End: 2025-04-15

## 2025-04-15 NOTE — TELEPHONE ENCOUNTER
4/15 Left voicemail to call back to reschedule 5/22 appt. Available appts on hold 5/2 and 5/16. Please assist.

## 2025-04-23 ENCOUNTER — OFFICE VISIT (OUTPATIENT)
Dept: FAMILY MEDICINE CLINIC | Facility: CLINIC | Age: 65
End: 2025-04-23
Payer: MEDICARE

## 2025-04-23 VITALS
OXYGEN SATURATION: 97 % | HEART RATE: 93 BPM | WEIGHT: 147 LBS | TEMPERATURE: 98 F | DIASTOLIC BLOOD PRESSURE: 84 MMHG | SYSTOLIC BLOOD PRESSURE: 136 MMHG | HEIGHT: 62 IN | BODY MASS INDEX: 27.05 KG/M2

## 2025-04-23 DIAGNOSIS — J44.1 COPD WITH ACUTE EXACERBATION (HCC): Primary | ICD-10-CM

## 2025-04-23 PROCEDURE — 99213 OFFICE O/P EST LOW 20 MIN: CPT

## 2025-04-23 RX ORDER — AZITHROMYCIN 250 MG/1
TABLET, FILM COATED ORAL
Qty: 6 TABLET | Refills: 0 | Status: SHIPPED | OUTPATIENT
Start: 2025-04-23 | End: 2025-04-28

## 2025-04-23 RX ORDER — PREDNISONE 20 MG/1
40 TABLET ORAL DAILY
Qty: 10 TABLET | Refills: 0 | Status: SHIPPED | OUTPATIENT
Start: 2025-04-23 | End: 2025-04-28

## 2025-04-23 RX ORDER — BENZONATATE 100 MG/1
100 CAPSULE ORAL 3 TIMES DAILY PRN
Qty: 30 CAPSULE | Refills: 0 | Status: SHIPPED | OUTPATIENT
Start: 2025-04-23

## 2025-04-23 NOTE — PATIENT INSTRUCTIONS
Start antibiotics, oral steroids and tessalon perles for cough  Continue supportive care   Return to clinic as needed

## 2025-04-23 NOTE — PROGRESS NOTES
Chief Complaint   Patient presents with    Cough     X 1 weeks     Runny Nose         [facility]  Юлия Charles is a 65 year old F pt who presents today for sick visit    Pt reports feeling \"crappy\" with a runny nose and productive cough. The patient describes her nasal discharge as initially clear but now turning greenish. She has been experiencing chills and body aches, which she notes might be slightly worse than her usual aches. Pt mentions feeling feverish at times, though she hasn't measured her temperature.    The patient's appetite has decreased, but she is forcing herself to eat and drink. She denies sore throat or ear pain. Pt has been using her albuterol inhaler to manage her breathing, which she reports helps. Pt reports a history of pneumonia, having had it twice before, and expresses concern about developing it again.    She reports taking only Tylenol for her symptoms, in addition to her albuterol.     ROS  As per HPI    Past Medical History[1]    Past Surgical History[2]    Social Hx on file[3]    Family History[4]     Medications Ordered Prior to Encounter[5]      Objective  Vitals:    04/23/25 0926   BP: 136/84   Pulse: 93   Temp: 98 °F (36.7 °C)   SpO2: 97%   Weight: 147 lb (66.7 kg)   Height: 5' 2\" (1.575 m)     Body mass index is 26.89 kg/m².    Physical Exam  Constitutional:       Appearance: Normal appearance.   HEENT:      Head: Normocephalic and atraumatic.      Eyes: PERRLA no notable nystagmus     Ears: normal on observation. TM clear b/l     Nose: Nose normal.      Mouth: Mucous membranes are moist. +mild erythematous oropharynx     Neck: no lymphadenopathy  Cardiovascular:      Rate and Rhythm: Normal rate and regular rhythm.   Pulmonary:      Effort: Pulmonary effort is normal.      Breath sounds: +diffuse expiratory wheezing  Musculoskeletal:         General: Normal range of motion.   Skin:     General: Skin is warm and dry.   Neurological:      General: No focal deficit present.       Mental Status: Alert and oriented to person, place, and time.   Psychiatric:         Mood and Affect: Mood normal.         Thought Content: Thought content normal  .       Assessment and Plan  Юлия was seen today for cough and runny nose.    Diagnoses and all orders for this visit:    COPD with acute exacerbation (HCC)  -     azithromycin 250 MG Oral Tab; Take 2 tablets (500 mg total) by mouth daily for 1 day, THEN 1 tablet (250 mg total) daily for 4 days.  -     predniSONE 20 MG Oral Tab; Take 2 tablets (40 mg total) by mouth daily for 5 days.  -     benzonatate (TESSALON PERLES) 100 MG Oral Cap; Take 1 capsule (100 mg total) by mouth 3 (three) times daily as needed.       Afebrile in office  Supportive care counseling provided: fluids, rest, OTC antipyretics  Red flag symptoms reviewed: Unremitting fevers despite medications, extreme lethargy, inability to tolerate PO intake  Patients verbalizes understanding, all questions/concerns addressed, in agreement w/plan  RTC as needed, or if symptoms worsen/persist         Follow up  Return if symptoms worsen or fail to improve.      Patient Instructions  Patient Instructions   Start antibiotics, oral steroids and tessalon perles for cough  Continue supportive care   Return to clinic as needed       Patrizia Louise MD          [1]   Past Medical History:   Arthritis    Back pain    Bloating    Blood in urine    Breast cancer, right breast (HCC)    Formatting of this note might be different from the original.  S/p b/l mastectomy  In 2008. On Aromacin      Calculus of kidney    Change in hair    Constipation    COPD (chronic obstructive pulmonary disease) (HCC)    No oxygen    Diarrhea, unspecified    Eye disease    Fatigue    Frequent urination    High cholesterol    History of blood transfusion    about 20 years ago    Intracranial tumor (HCC)    Irregular bowel habits    Pain in joints    Personal history of antineoplastic chemotherapy    Last treatment 2014     Stool incontinence    Stress    Visual impairment    glasses    Wears glasses   [2]   Past Surgical History:  Procedure Laterality Date    Brain surgery  2007    removal brain tumor          x2    Colonoscopy      Hysterectomy      Removal of kidney stone      Removal of lung,lobectomy      Tonsillectomy      Wom w/ cydney mastec or hx of mastec     [3]   Social History  Socioeconomic History    Marital status:    Tobacco Use    Smoking status: Former     Current packs/day: 0.00     Types: Cigarettes     Quit date: 2018     Years since quittin.6    Smokeless tobacco: Never   Vaping Use    Vaping status: Never Used   Substance and Sexual Activity    Alcohol use: Not Currently    Drug use: Not Currently     Types: Cocaine     Comment: long time ago   Other Topics Concern    Caffeine Concern Yes     Comment: coffee occ, pepsi    Exercise Yes     Comment: walking   [4]   Family History  Problem Relation Age of Onset    Heart Disease Father     Colon Polyps Mother     Substance Abuse Son     Diabetes Sister     Heart Disease Sister    [5]   Current Outpatient Medications on File Prior to Visit   Medication Sig Dispense Refill    atorvastatin 10 MG Oral Tab TAKE 1 TABLET(10 MG) BY MOUTH EVERY NIGHT 90 tablet 0    ALBUTEROL 108 (90 Base) MCG/ACT Inhalation Aero Soln INHALE 2 PUFFS INTO THE LUNGS EVERY 4 HOURS AS NEEDED FOR WHEEZING OR SHORTNESS OF BREATH 8.5 g 0    Wheat Dextrin (BENEFIBER OR) Take by mouth as needed.      acetaminophen 325 MG Oral Tab Take 1 tablet (325 mg total) by mouth every 6 (six) hours as needed for Pain.      Meloxicam 7.5 MG Oral Tab Take 1 tablet (7.5 mg total) by mouth daily. (Patient not taking: Reported on 2025) 30 tablet 1    phenazopyridine (PYRIDIUM) 100 MG Oral Tab Take 1 tablet (100 mg total) by mouth 3 (three) times daily as needed for Pain. This will turn your urine orange. (Patient not taking: Reported on 4/3/2025) 10 tablet 0     No current  facility-administered medications on file prior to visit.

## 2025-04-29 ENCOUNTER — OFFICE VISIT (OUTPATIENT)
Dept: FAMILY MEDICINE CLINIC | Facility: CLINIC | Age: 65
End: 2025-04-29
Payer: MEDICARE

## 2025-04-29 ENCOUNTER — LAB ENCOUNTER (OUTPATIENT)
Dept: LAB | Age: 65
End: 2025-04-29
Attending: NEUROLOGICAL SURGERY
Payer: MEDICARE

## 2025-04-29 ENCOUNTER — TELEPHONE (OUTPATIENT)
Dept: SURGERY | Facility: CLINIC | Age: 65
End: 2025-04-29

## 2025-04-29 ENCOUNTER — HOSPITAL ENCOUNTER (OUTPATIENT)
Dept: ULTRASOUND IMAGING | Age: 65
Discharge: HOME | End: 2025-04-29
Attending: UROLOGY
Payer: MEDICARE

## 2025-04-29 ENCOUNTER — HOSPITAL ENCOUNTER (OUTPATIENT)
Dept: GENERAL RADIOLOGY | Age: 65
Discharge: HOME | End: 2025-04-29
Payer: MEDICARE

## 2025-04-29 VITALS
OXYGEN SATURATION: 98 % | TEMPERATURE: 98 F | SYSTOLIC BLOOD PRESSURE: 134 MMHG | DIASTOLIC BLOOD PRESSURE: 80 MMHG | HEIGHT: 62 IN | RESPIRATION RATE: 16 BRPM | WEIGHT: 148.38 LBS | BODY MASS INDEX: 27.3 KG/M2 | HEART RATE: 80 BPM

## 2025-04-29 DIAGNOSIS — M54.12 CERVICAL MYELOPATHY WITH CERVICAL RADICULOPATHY (HCC): ICD-10-CM

## 2025-04-29 DIAGNOSIS — Z01.818 PRE-OPERATIVE CLEARANCE: ICD-10-CM

## 2025-04-29 DIAGNOSIS — Z01.818 PRE-OP TESTING: ICD-10-CM

## 2025-04-29 DIAGNOSIS — N20.0 KIDNEY STONE: ICD-10-CM

## 2025-04-29 DIAGNOSIS — Z01.818 PRE-OPERATIVE CLEARANCE: Primary | ICD-10-CM

## 2025-04-29 DIAGNOSIS — G95.9 CERVICAL MYELOPATHY WITH CERVICAL RADICULOPATHY (HCC): ICD-10-CM

## 2025-04-29 LAB
ANION GAP SERPL CALC-SCNC: 7 MMOL/L (ref 0–18)
ANTIBODY SCREEN: NEGATIVE
APTT PPP: 27.7 SECONDS (ref 23–36)
BASOPHILS # BLD AUTO: 0.07 X10(3) UL (ref 0–0.2)
BASOPHILS NFR BLD AUTO: 0.8 %
BILIRUB UR QL STRIP.AUTO: NEGATIVE
BUN BLD-MCNC: 13 MG/DL (ref 9–23)
CALCIUM BLD-MCNC: 10.2 MG/DL (ref 8.7–10.6)
CHLORIDE SERPL-SCNC: 103 MMOL/L (ref 98–112)
CLARITY UR REFRACT.AUTO: CLEAR
CO2 SERPL-SCNC: 29 MMOL/L (ref 21–32)
COLOR UR AUTO: COLORLESS
CREAT BLD-MCNC: 0.67 MG/DL (ref 0.55–1.02)
EGFRCR SERPLBLD CKD-EPI 2021: 96 ML/MIN/1.73M2 (ref 60–?)
EOSINOPHIL # BLD AUTO: 0.16 X10(3) UL (ref 0–0.7)
EOSINOPHIL NFR BLD AUTO: 1.8 %
ERYTHROCYTE [DISTWIDTH] IN BLOOD BY AUTOMATED COUNT: 13.5 %
FASTING STATUS PATIENT QL REPORTED: NO
GLUCOSE BLD-MCNC: 113 MG/DL (ref 70–99)
GLUCOSE UR STRIP.AUTO-MCNC: NORMAL MG/DL
HCT VFR BLD AUTO: 43.7 % (ref 35–48)
HGB BLD-MCNC: 14.5 G/DL (ref 12–16)
IMM GRANULOCYTES # BLD AUTO: 0.09 X10(3) UL (ref 0–1)
IMM GRANULOCYTES NFR BLD: 1 %
INR BLD: 1.06 (ref 0.8–1.2)
KETONES UR STRIP.AUTO-MCNC: NEGATIVE MG/DL
LEUKOCYTE ESTERASE UR QL STRIP.AUTO: NEGATIVE
LYMPHOCYTES # BLD AUTO: 2.2 X10(3) UL (ref 1–4)
LYMPHOCYTES NFR BLD AUTO: 25 %
MCH RBC QN AUTO: 29.2 PG (ref 26–34)
MCHC RBC AUTO-ENTMCNC: 33.2 G/DL (ref 31–37)
MCV RBC AUTO: 88.1 FL (ref 80–100)
MONOCYTES # BLD AUTO: 0.58 X10(3) UL (ref 0.1–1)
MONOCYTES NFR BLD AUTO: 6.6 %
NEUTROPHILS # BLD AUTO: 5.69 X10 (3) UL (ref 1.5–7.7)
NEUTROPHILS # BLD AUTO: 5.69 X10(3) UL (ref 1.5–7.7)
NEUTROPHILS NFR BLD AUTO: 64.8 %
NITRITE UR QL STRIP.AUTO: NEGATIVE
OSMOLALITY SERPL CALC.SUM OF ELEC: 289 MOSM/KG (ref 275–295)
PH UR STRIP.AUTO: 7 [PH] (ref 5–8)
PLATELET # BLD AUTO: 383 10(3)UL (ref 150–450)
POTASSIUM SERPL-SCNC: 3.2 MMOL/L (ref 3.5–5.1)
PROT UR STRIP.AUTO-MCNC: NEGATIVE MG/DL
PROTHROMBIN TIME: 13.9 SECONDS (ref 11.6–14.8)
RBC # BLD AUTO: 4.96 X10(6)UL (ref 3.8–5.3)
RBC UR QL AUTO: NEGATIVE
RH BLOOD TYPE: NEGATIVE
SODIUM SERPL-SCNC: 139 MMOL/L (ref 136–145)
SP GR UR STRIP.AUTO: <1.005 (ref 1–1.03)
UROBILINOGEN UR STRIP.AUTO-MCNC: NORMAL MG/DL
WBC # BLD AUTO: 8.8 X10(3) UL (ref 4–11)

## 2025-04-29 PROCEDURE — 85025 COMPLETE CBC W/AUTO DIFF WBC: CPT

## 2025-04-29 PROCEDURE — 71046 X-RAY EXAM CHEST 2 VIEWS: CPT

## 2025-04-29 PROCEDURE — 86900 BLOOD TYPING SEROLOGIC ABO: CPT

## 2025-04-29 PROCEDURE — 76770 US EXAM ABDO BACK WALL COMP: CPT | Performed by: UROLOGY

## 2025-04-29 PROCEDURE — 85730 THROMBOPLASTIN TIME PARTIAL: CPT

## 2025-04-29 PROCEDURE — 80048 BASIC METABOLIC PNL TOTAL CA: CPT

## 2025-04-29 PROCEDURE — 99214 OFFICE O/P EST MOD 30 MIN: CPT

## 2025-04-29 PROCEDURE — 81003 URINALYSIS AUTO W/O SCOPE: CPT

## 2025-04-29 PROCEDURE — 86901 BLOOD TYPING SEROLOGIC RH(D): CPT

## 2025-04-29 PROCEDURE — 87081 CULTURE SCREEN ONLY: CPT

## 2025-04-29 PROCEDURE — 93000 ELECTROCARDIOGRAM COMPLETE: CPT

## 2025-04-29 PROCEDURE — 86850 RBC ANTIBODY SCREEN: CPT

## 2025-04-29 PROCEDURE — 85610 PROTHROMBIN TIME: CPT

## 2025-04-29 PROCEDURE — 36415 COLL VENOUS BLD VENIPUNCTURE: CPT

## 2025-04-29 NOTE — TELEPHONE ENCOUNTER
----- Message from Silvia HERNDON sent at 4/29/2025  1:54 PM CDT -----    ----- Message -----  From: Joselo Dunn MD  Sent: 4/29/2025   1:17 PM CDT  To: [facility] ;  Urology Cl#    Please call patient and let her know that I have reviewed her ultrasound.  There are small stones noted on both sides, based on what I saw on her CT scan and in surgery this is likely   nephrocalcinosis or kidney stones that are within the wall of the kidney and do not require treatment.  For now they will discuss this in more detail when I see her at her next visit.  She can reach out to me if she has any other questions.  Thank you  ----- Message -----  From: Alfredo Delaney  Sent: 4/29/2025  12:26 PM CDT  To: Joselo Dunn MD

## 2025-04-29 NOTE — TELEPHONE ENCOUNTER
Phoned Patient to discuss below.  No answer, left voice message to call [phone] ext. [phone].

## 2025-04-29 NOTE — PROGRESS NOTES
Chief Complaint   Patient presents with    Pre-Op Exam     Surgery 5/20/25         HPI  Юлия Charles is a 65 year old F pt who presents today for pre-operative clearance   ?   HPI (reason for surgery): Pt is scheduled to undergo anterior C 4-5, C5-6, and C6-7 discectomy and fusion with cage, bone graft, plate/screws lumboperitoneal hunt ligation on 05/20/2025 with Dr. Gutierrez and Dr. Reed (fax: [phone])  ?  Pre-op  Prior surgical complications: No  Prior reactions to anesthesia:  No  Bleeding or clotting disorders:  No  Family hx of bleeding or clotting disorders: No     Revised Cardiac Risk Index: (complete EKG if high risk surgery, or 1 or more points and intermediate risk surgery) Cerebrovascular disease (1 point):     Elevated risk surgery (1 point):  Yes  Congestive heart failure (1 point):  No  Cr level >2.0 mg/dL (1 point):  No  Diabetes mellitus requiring insulin (1 point):  No  Ischemic cardiac disease (1 point):  No  Cerebrovascular disease (1 point):  No    ROS  No CP, SOB, nausea, vomiting, fevers, chills, dysuria    Allergies:     Severity Reactions Comments    Amoxicillin Low NAUSEA ONLY \"At least 20 years ago\"   Tamoxifen Low OTHER (SEE COMMENTS) Very emotional       Past Medical History[1]    Past Surgical History[2]    Social Hx on file[3]    Family History[4]     Medications Ordered Prior to Encounter[5]      Objective  Vitals:    04/29/25 1315   BP: 134/80   Pulse: 80   Resp: 16   Temp: 97.6 °F (36.4 °C)   SpO2: 98%   Weight: 148 lb 6.4 oz (67.3 kg)   Height: 5' 2\" (1.575 m)     Body mass index is 27.14 kg/m².    Physical Exam  Constitutional:       Appearance: Normal appearance.   HEENT:      Head: Normocephalic and atraumatic.      Eyes: PERRLA no notable nystagmus     Ears: normal on observation     Nose: Nose normal.      Mouth: Mucous membranes are moist.      Neck: no lymphadenopathy  Cardiovascular:      Rate and Rhythm: Normal rate and regular rhythm.   Pulmonary:      Effort:  Pulmonary effort is normal.      Breath sounds: Normal breath sounds.   Abdominal:      General: Bowel sounds are normal.      Palpations: Abdomen is soft. There is no mass.   Musculoskeletal:         General: Normal range of motion.       Neck ROM intact  Skin:     General: Skin is warm and dry.   Neurological:      General: No focal deficit present.      Mental Status: Alert and oriented to person, place, and time.   Psychiatric:         Mood and Affect: Mood normal.         Thought Content: Thought content normal.       Assessment and Plan  Юлия was seen today for pre-op exam.    Diagnoses and all orders for this visit:    Pre-operative clearance  -     EKG with interpretation and Report -IN OFFICE [50003]  -     XR CHEST PA + LAT CHEST (CPT=71046); Future    Cervical myelopathy with cervical radiculopathy (HCC)       Pt is scheduled to undergo anterior C 4-5, C5-6, and C6-7 discectomy and fusion with cage, bone graft, plate/screws lumboperitoneal hunt ligation on 05/20/2025 with Dr. Gutierrez and Dr. Reed (fax: [phone]).    Pre-op clearance completed as stated in HPI, all issues addressed  Functional capacity appears intact >4 METS  Medical management is currently optimized  EKG: unchanged from previous EKG. Last echo 11/2024 showed normal EF and no motion abnormalities  CBC, CMP, UA, PT/PTT/INR, MRSA/MSSA and CXR pending  Patient is low risk for surgery pending workup      Follow up  Return if symptoms worsen or fail to improve.      Patrizia Louise MD          [1]   Past Medical History:   Arthritis    Back pain    Bloating    Blood in urine    Breast cancer, right breast ([facility])    Formatting of this note might be different from the original.  S/p b/l mastectomy  In 2008. On Aromacin      Calculus of kidney    Change in hair    Constipation    COPD (chronic obstructive pulmonary disease) ([facility])    No oxygen    Diarrhea, unspecified    Eye disease    Fatigue    Frequent urination    High cholesterol     History of blood transfusion    about 20 years ago    Intracranial tumor (HCC)    Irregular bowel habits    Pain in joints    Personal history of antineoplastic chemotherapy    Last treatment     PONV (postoperative nausea and vomiting)    Stool incontinence    Stress    Visual impairment    glasses    Wears glasses   [2]   Past Surgical History:  Procedure Laterality Date    Brain surgery  2007    removal brain tumor          x2    Colonoscopy      Hysterectomy      Removal of kidney stone      Removal of lung,lobectomy Left     Tonsillectomy      Wom w/ cydney mastec or hx of mastec     [3]   Social History  Socioeconomic History    Marital status:    Tobacco Use    Smoking status: Former     Current packs/day: 0.00     Types: Cigarettes     Quit date: 2018     Years since quittin.6    Smokeless tobacco: Never   Vaping Use    Vaping status: Never Used   Substance and Sexual Activity    Alcohol use: Not Currently    Drug use: Not Currently     Types: Cocaine     Comment: long time ago   Other Topics Concern    Caffeine Concern Yes     Comment: coffee occ, pepsi    Exercise Yes     Comment: walking   [4]   Family History  Problem Relation Age of Onset    Heart Disease Father     Colon Polyps Mother     Substance Abuse Son     Diabetes Sister     Heart Disease Sister    [5]   Current Outpatient Medications on File Prior to Visit   Medication Sig Dispense Refill    Meloxicam 7.5 MG Oral Tab Take 1 tablet (7.5 mg total) by mouth daily. 30 tablet 1    atorvastatin 10 MG Oral Tab TAKE 1 TABLET(10 MG) BY MOUTH EVERY NIGHT 90 tablet 0    ALBUTEROL 108 (90 Base) MCG/ACT Inhalation Aero Soln INHALE 2 PUFFS INTO THE LUNGS EVERY 4 HOURS AS NEEDED FOR WHEEZING OR SHORTNESS OF BREATH 8.5 g 0    Wheat Dextrin (BENEFIBER OR) Take by mouth as needed.      acetaminophen 325 MG Oral Tab Take 1 tablet (325 mg total) by mouth every 6 (six) hours as needed for Pain.      phenazopyridine (PYRIDIUM) 100 MG Oral  Tab Take 1 tablet (100 mg total) by mouth 3 (three) times daily as needed for Pain. This will turn your urine orange. (Patient not taking: Reported on 4/3/2025) 10 tablet 0     No current facility-administered medications on file prior to visit.

## 2025-04-30 ENCOUNTER — TELEPHONE (OUTPATIENT)
Dept: FAMILY MEDICINE CLINIC | Facility: CLINIC | Age: 65
End: 2025-04-30

## 2025-04-30 NOTE — TELEPHONE ENCOUNTER
Patient sees she is due for can still get her shingrix vaccine. She wants to know if she can get that done before her surgery, or should she wait?   Surgery is scheduled for May 20th. Preop was done yesterday.

## 2025-04-30 NOTE — TELEPHONE ENCOUNTER
Per Yasmine Peng, our nurse practitioner wait until after surgery for Shingrix. Get this done at pharmacy    Patient advised. Verbalized understanding.

## 2025-05-01 NOTE — TELEPHONE ENCOUNTER
Rn called Patient and let her know that Dr. Dunn  has reviewed her ultrasound.  There are small stones noted on both sides, based on what he saw on her CT scan and in surgery this is likely nephrocalcinosis or kidney stones that are within the wall of the kidney and do not require treatment. Dr. Dunn recommendation is the following.    For now he will discuss this in more detail during her next office visit on 10/8/25. Patient advised that she can reach out to me if she has any other questions.  Patient agreeable with review and recommendation and has no questions at this time.  This Encounter is now closed.

## 2025-05-01 NOTE — TELEPHONE ENCOUNTER
Rn (2) attempt to sukumar, also Spouse/Inocencio relay Dr. Dunn review and  recommendation of imaging.  No answer, left voice message to call [phone] ext. [phone].  Patient does not have Adict[employer] Message set up.    .

## 2025-05-02 ENCOUNTER — TELEPHONE (OUTPATIENT)
Dept: FAMILY MEDICINE CLINIC | Facility: CLINIC | Age: 65
End: 2025-05-02

## 2025-05-02 ENCOUNTER — OFFICE VISIT (OUTPATIENT)
Dept: NEUROLOGY | Facility: CLINIC | Age: 65
End: 2025-05-02
Payer: MEDICARE

## 2025-05-02 VITALS
WEIGHT: 143.19 LBS | DIASTOLIC BLOOD PRESSURE: 74 MMHG | SYSTOLIC BLOOD PRESSURE: 124 MMHG | BODY MASS INDEX: 26 KG/M2 | HEART RATE: 86 BPM | RESPIRATION RATE: 16 BRPM

## 2025-05-02 DIAGNOSIS — G96.810 INTRACRANIAL HYPOTENSION: ICD-10-CM

## 2025-05-02 DIAGNOSIS — R41.3 MEMORY LOSS: Primary | ICD-10-CM

## 2025-05-02 PROCEDURE — 99214 OFFICE O/P EST MOD 30 MIN: CPT | Performed by: OTHER

## 2025-05-02 PROCEDURE — G2211 COMPLEX E/M VISIT ADD ON: HCPCS | Performed by: OTHER

## 2025-05-02 NOTE — PROGRESS NOTES
Neurology History & Physical     ASSESSMENT & PLAN:      ICD-10-CM    1. Memory loss  R41.3       2. Intracranial hypotension  G96.810         Diffuse pachymeningeal enhancement on MRI brain, likely due to intracranial hypotension.  Past cerebellar tumor resection and LP shunt placement.  Planning LP shunt ligation (Derek) + C4-C7 ACDF (Gutierrez) together May 20.    Cognitive dysfunction / Memory loss, stable to slightly improved.  Exam in 2024 consistent with  Mild cognitive impairment (MoCA 17-25).  Neuropsych testing pending.      As a cause is relatively clear, will defer AI Neurology panel, AI profile, IgG4 Ab, ESR, CRP; CT C/A/P for malginancy and granulomatous disease; LP with CSF ACE, cyto and flow, VDRL, fungal and TB and crypto.  Does have breast cancer and cerebellar tumor history.     I intend to be providing an ongoing, continuous, and active collaborative plan of care for the problem(s) above (the management of which require my specialized clinical knowledge, skill, and expertise).      Return in about 3 months (around 8/2/2025).       ~~~~~~~~~~~~~~~~~~~~~~~~~~~    CHIEF COMPLAINT / REASON FOR VISIT:    Chief Complaint   Patient presents with    Neurologic Problem    Memory Loss     HISTORY OBTAINED FROM:  Patient and   Chart review    HISTORY:  Юлия Charles is a 65 year old female with 1.5-2 years of slowly progressive memory problems.  She repeats questions, forgets recent discussions.  LTM is relatively preserved.  (Had cerebellar tumor resected 10 years ago, not malignant.)  Lives with , daughter, granddaughter.  She has not forgotten bills or groceries.  No issues driving, not accidents.  Retired  (at age 14).    Does not snore.  Mood is good.  Does have mild tremor.    No limb numbness or weakness.  No vision or hearing changes.  No dizziness, no balance or gait issues.    No new headaches outside of her chronic headaches.      INTERIM HISTORY:  Memory is stable,  maybe slightly improved.  No new complaints.    Driving status:  Driving    DATA REVIEWED:  As documented in the history    April 2025  FM note  NSGY note (Brenda)    March 2025  MRI brain   \"1. Sequelae of right suboccipital craniectomy along with resection cavity in volume loss in the right cerebellar hemisphere is noted.   2. Marked pachymeningeal enhancement throughout the brain parenchyma is noted.  This may be sequelae of intracranial hypotension.  This can also be due to sequelae of chronic infectious or inflammatory process.  Diffuse carcinomatosis cannot be excluded   though is considered unlikely. \"   MRI C-T-L spine  \"1. There is a right occipital craniotomy defect as described on recent MR imaging of the brain.  Along this surgical defect there is evidence of a pseudomeningocele tracking cephalad along the right cerebellar region.  The appearance would be suggestive   of a CSF leak perhaps leading to intracranial hypotension.   2. Similar but to a lesser degree than what was noted within the brain, there is evidence of increased epidural enhancement, most notably along the cervical spine and to a lesser degree the lumbar spine, suspicious for intracranial hypotension.   3. Multilevel degenerative disc disease and facet degenerative changes of the cervical spine, most pronounced at C5-6 where there is mild to moderate central canal stenosis and moderate bilateral neural foraminal stenosis.   4. Multilevel degenerative disc disease and facet degenerative disease within the lumbar spine.  There is mild central canal stenosis and mild to moderate bilateral subarticular neural foraminal stenosis involving the levels of L3-4 through L5-S1.   5. The spinal cord reveals a normal course, caliber and signal.  No abnormal enhancement of the cord.\"  (I independently analyzed and interpreted the above, and I agree with the reading physician report(s).)    Oct 2024  HFP unremarkable   CK unremarkable   LDL 92    Sept  2024  Folate unremarkable     Aug 2024  FM APRN note   Head CT unremarkable (other than post op changes of suboccipital crani with right cerebellar encephalomalacia)  CBC WBC 14.59  CMP unremarkable     July 2024  TSH, FT4, B12 unremarkable     PHYSICAL EXAMINATION:  /74   Pulse 86   Resp 16   Wt 143 lb 3.2 oz (65 kg)   BMI 26.19 kg/m²     Gen: in NAD  MSE: nl attn/conc, nl language, nl fund of knowledge  CN: EOMI, VFF, nl facial mvmt, nl palate, nl tongue  Motor: 5/5 x4, no drift  DTR: 2+ BUE and BLE  Coord: nl FTN b/I  Gait: normal    Allergies   Allergen Reactions    Amoxicillin NAUSEA ONLY     \"At least 20 years ago\"    Tamoxifen OTHER (SEE COMMENTS)     Very emotional         Current medications:  Current Outpatient Medications on File Prior to Visit   Medication Sig Dispense Refill    atorvastatin 10 MG Oral Tab TAKE 1 TABLET(10 MG) BY MOUTH EVERY NIGHT 90 tablet 0    ALBUTEROL 108 (90 Base) MCG/ACT Inhalation Aero Soln INHALE 2 PUFFS INTO THE LUNGS EVERY 4 HOURS AS NEEDED FOR WHEEZING OR SHORTNESS OF BREATH 8.5 g 0    Wheat Dextrin (BENEFIBER OR) Take by mouth as needed.      acetaminophen 325 MG Oral Tab Take 1 tablet (325 mg total) by mouth every 6 (six) hours as needed for Pain.      Meloxicam 7.5 MG Oral Tab Take 1 tablet (7.5 mg total) by mouth daily. (Patient not taking: Reported on 5/2/2025) 30 tablet 1    phenazopyridine (PYRIDIUM) 100 MG Oral Tab Take 1 tablet (100 mg total) by mouth 3 (three) times daily as needed for Pain. This will turn your urine orange. (Patient not taking: Reported on 5/2/2025) 10 tablet 0     No current facility-administered medications on file prior to visit.          Past Medical History:    Arthritis    Back pain    Bloating    Blood in urine    Breast cancer, right breast (HCC)    Formatting of this note might be different from the original.  S/p b/l mastectomy  In 2008. On Aromacin      Calculus of kidney    Change in hair    Constipation    COPD (chronic  obstructive pulmonary disease) (HCC)    No oxygen    Diarrhea, unspecified    Eye disease    Fatigue    Frequent urination    High cholesterol    History of blood transfusion    about 20 years ago    Intracranial tumor (HCC)    Irregular bowel habits    Pain in joints    Personal history of antineoplastic chemotherapy    Last treatment     PONV (postoperative nausea and vomiting)    Stool incontinence    Stress    Visual impairment    glasses    Wears glasses       Past Surgical History:   Procedure Laterality Date    Brain surgery      removal brain tumor          x2    Colonoscopy      Hysterectomy      Removal of kidney stone      Removal of lung,lobectomy Left     Tonsillectomy      Wom w/ cydney mastec or hx of mastec         Social History     Socioeconomic History    Marital status:    Tobacco Use    Smoking status: Former     Current packs/day: 0.00     Types: Cigarettes     Quit date: 2018     Years since quittin.6    Smokeless tobacco: Never   Vaping Use    Vaping status: Never Used   Substance and Sexual Activity    Alcohol use: Not Currently    Drug use: Not Currently     Types: Cocaine     Comment: long time ago   Other Topics Concern    Caffeine Concern Yes     Comment: coffee occ, pepsi    Exercise Yes     Comment: walking       Family History   Problem Relation Age of Onset    Heart Disease Father     Colon Polyps Mother     Substance Abuse Son     Diabetes Sister     Heart Disease Sister        Oliver Pierre MD, FAES, FAAN  Board-Certified in Neurology, Epilepsy, and Clinical Neurophysiology  [facility] [place]

## 2025-05-02 NOTE — PATIENT INSTRUCTIONS
Refill policies:    Allow 2-3 business days for refills; controlled substances may take longer.  Contact your pharmacy at least 5 days prior to running out of medication and have them send an electronic request or submit request through the “request refill” option in your [employer] account.  Refills are not addressed on weekends; covering physicians do not authorize routine medications on weekends.  No narcotics or controlled substances are refilled after noon on Fridays or by on call physicians.  By law, narcotics must be electronically prescribed.  A 30 day supply with no refills is the maximum allowed.  If your prescription is due for a refill, you may be due for a follow up appointment.  To best provide you care, patients receiving routine medications need to be seen at least once a year.  Patients receiving narcotic/controlled substance medications need to be seen at least once every 3 months.  In the event that your preferred pharmacy does not have the requested medication in stock (e.g. Backordered), it is your responsibility to find another pharmacy that has the requested medication available.  We will gladly send a new prescription to that pharmacy at your request.    Scheduling Tests:    If your physician has ordered radiology tests such as MRI or CT scans, please contact Central Scheduling at [phone] right away to schedule the test.  Once scheduled, the [facility] Centralized Referral Team will work with your insurance carrier to obtain pre-certification or prior authorization.  Depending on your insurance carrier, approval may take 3-10 days.  It is highly recommended patients assure they have received an authorization before having a test performed.  If test is done without insurance authorization, patient may be responsible for the entire amount billed.      Precertification and Prior Authorizations:  If your physician has recommended that you have a procedure or additional testing performed the [facility]  Centralized Referral Team will contact your insurance carrier to obtain pre-certification or prior authorization.    You are strongly encouraged to contact your insurance carrier to verify that your procedure/test has been approved and is a COVERED benefit.  Although the [facility] Centralized Referral Team does its due diligence, the insurance carrier gives the disclaimer that \"Although the procedure is authorized, this does not guarantee payment.\"    Ultimately the patient is responsible for payment.   Thank you for your understanding in this matter.  Paperwork Completion:  If you require FMLA or disability paperwork for your recovery, please make sure to either drop it off or have it faxed to our office at [phone]. Be sure the form has your name and date of birth on it.  The form will be faxed to our Forms Department and they will complete it for you.  There is a 25$ fee for all forms that need to be filled out.  Please be aware there is a 10-14 day turnaround time.  You will need to sign a release of information (ANDREY) form if your paperwork does not come with one.  You may call the Forms Department with any questions at [phone].  Their fax number is [phone].

## 2025-05-05 ENCOUNTER — TELEPHONE (OUTPATIENT)
Dept: FAMILY MEDICINE CLINIC | Facility: CLINIC | Age: 65
End: 2025-05-05

## 2025-05-05 LAB
ATRIAL RATE: 93 BPM
P AXIS: 45 DEGREES
P-R INTERVAL: 156 MS
Q-T INTERVAL: 350 MS
QRS DURATION: 86 MS
QTC CALCULATION (BEZET): 435 MS
R AXIS: -8 DEGREES
T AXIS: 21 DEGREES
VENTRICULAR RATE: 93 BPM

## 2025-05-05 NOTE — PROGRESS NOTES
Recent labs reviewed; CMP shows hypokalemia - replete potassium prior to surgery  CXR is normal   Patient is cleared for surgery

## 2025-05-06 ENCOUNTER — TELEPHONE (OUTPATIENT)
Age: 65
End: 2025-05-06

## 2025-05-06 NOTE — TELEPHONE ENCOUNTER
Attempted to reach patient for her Spine Navigator appointment scheduled for today 5/6/25 at 9 am.  3 attempts were made to reach the patient and 2 voice mails left.    Patient will need to reschedule her appointment. This call should be completed prior to Dr. Gutierrez's Preoperative appointment on 5/14/25.    Patient is not active on [facility]. Unable to send message.

## 2025-05-08 ENCOUNTER — OFFICE VISIT (OUTPATIENT)
Facility: LOCATION | Age: 65
End: 2025-05-08
Attending: NEUROLOGICAL SURGERY
Payer: MEDICARE

## 2025-05-08 ENCOUNTER — ANESTHESIA EVENT (OUTPATIENT)
Dept: SURGERY | Facility: HOSPITAL | Age: 65
End: 2025-05-08
Payer: MEDICARE

## 2025-05-08 DIAGNOSIS — G95.9 CERVICAL MYELOPATHY WITH CERVICAL RADICULOPATHY (HCC): Primary | ICD-10-CM

## 2025-05-08 DIAGNOSIS — M54.12 CERVICAL MYELOPATHY WITH CERVICAL RADICULOPATHY (HCC): Primary | ICD-10-CM

## 2025-05-08 PROCEDURE — 97162 PT EVAL MOD COMPLEX 30 MIN: CPT

## 2025-05-08 PROCEDURE — 97110 THERAPEUTIC EXERCISES: CPT

## 2025-05-08 NOTE — PROGRESS NOTES
SPINE EVALUATION:     Diagnosis:   Cervical myelopathy with cervical radiculopathy (HCC) (G95.9,M54.12) Patient:  Юлия Charles (65 year old, female)        Referring Provider: YOVANA Gutierrez  Today's Date   2025    Precautions:  None   Date of Evaluation: 25  Next MD visit: No data recorded  Date of Surgery: 25     PATIENT SUMMARY   Summary of chief complaints: Neck pain/stiffness  History of current condition: The patient presents to outpatient physical therapy with complaints of neck pain/stiffness and some worsening balance. She reports she's scheduled to under go a cervical surgery on 25   Pain level: current 2 /10, at best 0 /10, at worst 4 /10  Description of symptoms: The patient reports pain in the neck. She is unable to identify specific aggravating or easing factors   Prior level of function: The patient reports she is independent with ADLs and self-care tasks  Current limitations: difficulty with looking up/turning head  Pt goals: decreasing pain  Past medical history was reviewed with Юлия.  Significant findings include:    Юлия  has a past medical history of Arthritis, Back pain, Bloating, Blood in urine, Breast cancer, right breast (HCC) (10/24/2013), Calculus of kidney, Change in hair, Constipation, COPD (chronic obstructive pulmonary disease) (HCC), Diarrhea, unspecified, Eye disease, Fatigue, Frequent urination, High cholesterol, History of blood transfusion, Intracranial tumor (HCC) (2021), Irregular bowel habits, Pain in joints, Personal history of antineoplastic chemotherapy, PONV (postoperative nausea and vomiting), Stool incontinence, Stress, Visual impairment, and Wears glasses.  Юлия  has a past surgical history that includes wom w/ cydney mastec or hx of mastec; hysterectomy; tonsillectomy; Brain Surgery (); removal of lung,lobectomy (Left); removal of kidney stone; ; and colonoscopy.    ASSESSMENT  Юлия presents to physical  therapy evaluation with primary c/o Neck pain/stiffness. The results of the objective tests and measures show decreased cervical ROM, poor posture. Functional deficits include but are not limited to difficulty with looking up/turning head. Signs and symptoms are consistent with diagnosis of Cervical myelopathy with cervical radiculopathy (HCC) (G95.9,M54.12). Pt and PT discussed evaluation findings, pathology, POC and HEP.  Pt voiced understanding and performs HEP correctly without reported pain. Skilled Physical Therapy is medically necessary to address the above impairments and reach functional goals.    OBJECTIVE:    Musculoskeletal:  Observation/Posture: -- (slightly forward head and rounded shoulders)   Palpation: Mild tenderness and hypertonicity throughout the cervical paraspinals, UT, LS     ROM: Cervical AROM Flexion WFL, Extension severe limitations, rotation minimal limitations, Side bending minimal limitations    Strength:  Bilateral UE myotomal testing noted to be 5/5    Flexibility: Minimal limitations in bilateral pectorals, UT, LS, Scalenes     Balance and Functional Mobility:  Gait: pt ambulates on level ground with -- (slightly wide BENJA, decreased step length and push off).       Today's Treatment and Response:   Pt education was provided on exam findings, treatment diagnosis, treatment plan, expectations, and prognosis.  Today's Treatment       5/8/2025   Spine Treatment   Therapeutic Exercise Seated bilateral shoulder ER green band 2 x 15  Supine horizontal abd green band 2 x 15  Seated shoulder rows green band 2 x 15  Reviewed HEP/POC   Therapeutic Exercise Minutes 8   Evaluation Minutes 25   Total Time Of Timed Procedures 8   Total Time Of Service-Based Procedures 25   Total Treatment Time 33   HEP Seated bilateral shoulder ER green band 2 x 15  Supine horizontal abd green band 2 x 15  Seated shoulder rows green band 2 x 15  Reviewed HEP/POC        Patient was instructed in and issued a HEP  for: Seated bilateral shoulder ER green band 2 x 15  Supine horizontal abd green band 2 x 15  Seated shoulder rows green band 2 x 15  Reviewed HEP/POC    Charges:  PT EVAL: Moderate Complexity, TherEx x 1  In agreement with evaluation findings and clinical rationale, this evaluation involved MODERATE COMPLEXITY decision making due to 1-2 personal factors/comorbidities, 3 or more body structures involved/activity limitations, and evolving symptoms as documented in the evaluation.                                                                         PLAN OF CARE:    Goals: (to be met in 1 visits)   The patient will demonstrate independence with a home exercise program. Met     Frequency / Duration: Patient will be seen 1x/week or a total of 1  visits over a 90 day period. Treatment will include: Therapeutic Exercise; Home Exercise Program instruction    Education or treatment limitation: None   Rehab Potential: fair     Neck Disability Index Score  Score: (Patient-Rptd) 42 % (5/8/2025 12:13 PM)      Patient/Family/Caregiver was advised of these findings, precautions, and treatment options and has agreed to actively participate in planning and for this course of care.    Thank you for your referral. Please co-sign or sign and return this letter via fax as soon as possible to [phone]. If you have any questions, please contact me at Dept: [phone]    Sincerely,  Electronically signed by therapist: Everette Galvan, PT  Physician's certification required: Yes  I certify the need for these services furnished under this plan of treatment and while under my care.    X___________________________________________________ Date____________________    Certification From: 5/8/2025  To:8/6/2025

## 2025-05-09 NOTE — TELEPHONE ENCOUNTER
PCP presurgical clearance received per OV note in chart dated 4.29.25, \"Recent labs reviewed; CMP shows hypokalemia - CXR is normal   Patient is cleared for surgery\"

## 2025-05-10 ENCOUNTER — TELEPHONE (OUTPATIENT)
Dept: FAMILY MEDICINE CLINIC | Facility: CLINIC | Age: 65
End: 2025-05-10

## 2025-05-10 DIAGNOSIS — J44.9 CHRONIC OBSTRUCTIVE PULMONARY DISEASE, UNSPECIFIED COPD TYPE (HCC): ICD-10-CM

## 2025-05-10 RX ORDER — ALBUTEROL SULFATE 90 UG/1
2 INHALANT RESPIRATORY (INHALATION) EVERY 4 HOURS PRN
Qty: 8.5 G | Refills: 0 | Status: SHIPPED | OUTPATIENT
Start: 2025-05-10

## 2025-05-12 ENCOUNTER — APPOINTMENT (OUTPATIENT)
Facility: LOCATION | Age: 65
End: 2025-05-12
Attending: NEUROLOGICAL SURGERY
Payer: MEDICARE

## 2025-05-12 ENCOUNTER — TELEPHONE (OUTPATIENT)
Dept: FAMILY MEDICINE CLINIC | Facility: CLINIC | Age: 65
End: 2025-05-12

## 2025-05-12 DIAGNOSIS — E87.6 HYPOKALEMIA: Primary | ICD-10-CM

## 2025-05-12 DIAGNOSIS — E87.6 LOW BLOOD POTASSIUM: Primary | ICD-10-CM

## 2025-05-12 RX ORDER — POTASSIUM CHLORIDE 750 MG/1
10 TABLET, EXTENDED RELEASE ORAL DAILY
Qty: 30 TABLET | Refills: 0 | Status: SHIPPED | OUTPATIENT
Start: 2025-05-12

## 2025-05-12 NOTE — TELEPHONE ENCOUNTER
Fax received from pre admission testing/anesthesiologist  Pre op BMP ordered by Dr Brenda ESPINOSA+ 3.2    Pre op done 4/19/25 by Dr Louise    Please advise

## 2025-05-12 NOTE — TELEPHONE ENCOUNTER
Spoke with patient - instructed to start the potassium supplement and have lab repeated on Saturday.  Future Appointments   Date Time Provider Department Center   5/14/2025  1:40 PM YOVANA Gutierrez DO ENINAPER2 [facility]   5/16/2025  9:00 AM SPINE NAVIGATOR Spine Center None   5/17/2025  8:45 AM EMG [facility] NURSE EMGOSW [facility]   5/20/2025 11:00 AM EH US INTRAOPERATIVE [facility]   5/28/2025  2:30 PM Alma Delia Patterson APRN ENINAPER2 [facility]   6/18/2025 11:40 AM YOVANA Gutierrez DO ENINATAMMY2 [facility]   7/29/2025 10:00 AM Oliver Pierre MD ENFAITHPER [facility]   10/8/2025  2:30 PM Joselo Dunn MD [id number] [facility]     Patient verbalized understanding

## 2025-05-12 NOTE — PAT NURSING NOTE
Low K+ of 3.2 reviewed by /anesthesia. He requests note of clearance pre op. Faxed request to and spoke herbie Madrigal/(pcp) office. She will give message to pcp. Noted in procedure pass that surgeon is aware of K+ result.

## 2025-05-12 NOTE — TELEPHONE ENCOUNTER
Farhana rodriguez pre admission called said she sent a fax requesting comment on potassium per anesthesiologist.

## 2025-05-13 NOTE — TELEPHONE ENCOUNTER
Future Appointments   Date Time Provider Department Center   5/14/2025  1:40 PM YOVANA Gutierrez DO ENINAPER2 [facility]   5/16/2025  9:00 AM SPINE NAVIGATOR Spine Center None   5/17/2025  8:45 AM EMG [facility] NURSE EMGOSW [facility]   5/20/2025 11:00 AM EH US INTRAOPERATIVE E[facility]   5/28/2025  2:30 PM Alma Delia Patterson APRN ENINAPER2 [facility]   6/18/2025 11:40 AM YOVANA Gutierrez DO ENINAPER2 [facility]   7/29/2025 10:00 AM Oliver Pierre MD ENINAPER [facility]   10/8/2025  2:30 PM Joselo Dunn MD [id number] [facility]     Patient having repeat BMP on 5/17/2025  Started potassium supplement

## 2025-05-14 ENCOUNTER — OFFICE VISIT (OUTPATIENT)
Dept: SURGERY | Facility: CLINIC | Age: 65
End: 2025-05-14
Payer: MEDICARE

## 2025-05-14 ENCOUNTER — TELEPHONE (OUTPATIENT)
Dept: SURGERY | Facility: CLINIC | Age: 65
End: 2025-05-14

## 2025-05-14 VITALS
BODY MASS INDEX: 26.68 KG/M2 | HEART RATE: 85 BPM | SYSTOLIC BLOOD PRESSURE: 130 MMHG | DIASTOLIC BLOOD PRESSURE: 72 MMHG | WEIGHT: 145 LBS | HEIGHT: 62 IN

## 2025-05-14 DIAGNOSIS — G95.9 CERVICAL MYELOPATHY WITH CERVICAL RADICULOPATHY (HCC): Primary | ICD-10-CM

## 2025-05-14 DIAGNOSIS — M54.12 CERVICAL MYELOPATHY WITH CERVICAL RADICULOPATHY (HCC): Primary | ICD-10-CM

## 2025-05-14 PROCEDURE — 99215 OFFICE O/P EST HI 40 MIN: CPT | Performed by: NEUROLOGICAL SURGERY

## 2025-05-14 NOTE — TELEPHONE ENCOUNTER
Fax from [facility] for written order for Cervical Collar received by MA clinical staff, endorsed to provider for review.     Given to Dr Gutierrez for signature, form signed and faxed to # [phone] with confirmation received, copy sent to scan

## 2025-05-14 NOTE — PATIENT INSTRUCTIONS
Refill policies:    Allow 2-3 business days for refills; controlled substances may take longer.  Contact your pharmacy at least 5 days prior to running out of medication and have them send an electronic request or submit request through the “request refill” option in your [employer] account.  Refills are not addressed on weekends; covering physicians do not authorize routine medications on weekends.  No narcotics or controlled substances are refilled after noon on Fridays or by on call physicians.  By law, narcotics must be electronically prescribed.  A 30 day supply with no refills is the maximum allowed.  If your prescription is due for a refill, you may be due for a follow up appointment.  To best provide you care, patients receiving routine medications need to be seen at least once a year.  Patients receiving narcotic/controlled substance medications need to be seen at least once every 3 months.  In the event that your preferred pharmacy does not have the requested medication in stock (e.g. Backordered), it is your responsibility to find another pharmacy that has the requested medication available.  We will gladly send a new prescription to that pharmacy at your request.    Scheduling Tests:    If your physician has ordered radiology tests such as MRI or CT scans, please contact Central Scheduling at [phone] right away to schedule the test.  Once scheduled, the [facility] Centralized Referral Team will work with your insurance carrier to obtain pre-certification or prior authorization.  Depending on your insurance carrier, approval may take 3-10 days.  It is highly recommended patients assure they have received an authorization before having a test performed.  If test is done without insurance authorization, patient may be responsible for the entire amount billed.      Precertification and Prior Authorizations:  If your physician has recommended that you have a procedure or additional testing performed the [facility]  Centralized Referral Team will contact your insurance carrier to obtain pre-certification or prior authorization.    You are strongly encouraged to contact your insurance carrier to verify that your procedure/test has been approved and is a COVERED benefit.  Although the [facility] Centralized Referral Team does its due diligence, the insurance carrier gives the disclaimer that \"Although the procedure is authorized, this does not guarantee payment.\"    Ultimately the patient is responsible for payment.   Thank you for your understanding in this matter.  Paperwork Completion:  If you require FMLA or disability paperwork for your recovery, please make sure to either drop it off or have it faxed to our office at [phone]. Be sure the form has your name and date of birth on it.  The form will be faxed to our Forms Department and they will complete it for you.  There is a 25$ fee for all forms that need to be filled out.  Please be aware there is a 10-14 day turnaround time.  You will need to sign a release of information (ANDREY) form if your paperwork does not come with one.  You may call the Forms Department with any questions at [phone].  Their fax number is [phone].

## 2025-05-14 NOTE — PROGRESS NOTES
[facility] [place]  Neurological Surgery Clinic Note     Юлия Charles  4/2/1960  [id number]  PCP: Patrizia Louise MD     REASON FOR VISIT:  R hand weakness and gait instability     HISTORY OF PRESENT ILLNESS:  Юлия Charles is a(n) 65 year old female who was originally seeing Dr. Reed for a possible CSF leak leading to headaches. She had a cerebellar mass removed and a LP shunt placed for persistent pseudomeningocele.  She has had headaches for quite some time.  She also complains of  weakness and gait instability.  MRI brain was done showing meningeal enhancement and a stable pseudomeningocele in the R cerebellar region.  MRI C spine was done as well showing severe spinal stenosis from C4-7 worse at C5-6 with cord compression but no myelomalacia.          Location: headaches, neck pain  Quality:  weakness and gait instability  Severity: progressive  Duration: months  Timing: any  Context: severe spinal stenosis with cord compression of C spine, meningeal enhancement on MR brain c/w possible leak  Modifying Factors: PT and meds offer no relief   Associated signs/symptoms: pain       PAST MEDICAL HISTORY:  Past Medical History       Past Medical History:    Arthritis    Back pain    Bloating    Blood in urine    Breast cancer, right breast (HCC)     Formatting of this note might be different from the original.  S/p b/l mastectomy  In 2008. On Aromacin      Calculus of kidney    Change in hair    Constipation    COPD (chronic obstructive pulmonary disease) (HCC)     No oxygen    Diarrhea, unspecified    Eye disease    Fatigue    Frequent urination    High cholesterol    History of blood transfusion     about 20 years ago    Intracranial tumor (HCC)    Irregular bowel habits    Pain in joints    Personal history of antineoplastic chemotherapy     Last treatment 2014    Stool incontinence    Stress    Visual impairment     glasses    Wears glasses            PAST SURGICAL HISTORY:  Past  Surgical History         Past Surgical History:   Procedure Laterality Date    Brain surgery        removal brain tumor             x2    Colonoscopy        Hysterectomy        Removal of kidney stone        Removal of lung,lobectomy        Tonsillectomy        Wom w/ cydney mastec or hx of mastec                FAMILY HISTORY:  family history includes Colon Polyps in her mother; Diabetes in her sister; Heart Disease in her father and sister; Substance Abuse in her son.     SOCIAL HISTORY:   reports that she quit smoking about 6 years ago. Her smoking use included cigarettes. She has never used smokeless tobacco. She reports that she does not currently use alcohol. She reports that she does not currently use drugs after having used the following drugs: Cocaine.     ALLERGIES:  [Allergies]    [Allergies]        Allergen Reactions    Amoxicillin NAUSEA ONLY       \"At least 20 years ago\"    Tamoxifen OTHER (SEE COMMENTS)       Very emotional           MEDICATIONS:  [Medications Ordered Prior to Encounter]    [Medications Ordered Prior to Encounter]         Current Outpatient Medications on File Prior to Visit   Medication Sig Dispense Refill    atorvastatin 10 MG Oral Tab TAKE 1 TABLET(10 MG) BY MOUTH EVERY NIGHT 90 tablet 0    ALBUTEROL 108 (90 Base) MCG/ACT Inhalation Aero Soln INHALE 2 PUFFS INTO THE LUNGS EVERY 4 HOURS AS NEEDED FOR WHEEZING OR SHORTNESS OF BREATH 8.5 g 0    Wheat Dextrin (BENEFIBER OR) Take by mouth as needed.        acetaminophen 325 MG Oral Tab Take 1 tablet (325 mg total) by mouth every 6 (six) hours as needed for Pain.        phenazopyridine (PYRIDIUM) 100 MG Oral Tab Take 1 tablet (100 mg total) by mouth 3 (three) times daily as needed for Pain. This will turn your urine orange. (Patient not taking: Reported on 4/3/2025) 10 tablet 0    Polyethylene Glycol 3350 (MIRALAX) 17 g Oral Powd Pack Take 17 g by mouth daily as needed (Take to avoid constipation, especially if taking narcotic  pain medications.). (Patient not taking: Reported on 3/4/2025) 20 packet 1      No current facility-administered medications on file prior to visit.        REVIEW OF SYSTEMS:  Review of Systems   All other systems reviewed and are negative.        PHYSICAL EXAMINATION:  Neurological Exam  Mental Status  Awake, alert and oriented to person, place and time.     Cranial Nerves  CN II: Visual acuity is normal. Visual fields full to confrontation.  CN III, IV, VI: Extraocular movements intact bilaterally. Normal lids and orbits bilaterally. Pupils equal round and reactive to light bilaterally.  CN V: Facial sensation is normal.  CN VII: Full and symmetric facial movement.  CN VIII: Hearing is normal.  CN IX, X: Palate elevates symmetrically. Normal gag reflex.  CN XI: Shoulder shrug strength is normal.  CN XII: Tongue midline without atrophy or fasciculations.     Motor  Normal muscle bulk throughout. No fasciculations present. Normal muscle tone. No abnormal involuntary movements. Strength is 5/5 in all four extremities except as noted.                                             Right                     Left   Interossei                              3+                          4-     Sensory  Sensation is intact to light touch, pinprick, vibration and proprioception in all four extremities.     Reflexes  Deep tendon reflexes are 2+ and symmetric in all four extremities.     Coordination  Right: Finger-to-nose abnormality:     Gait  Casual gait: Wide stance. Normal stride length. Ataxic gait.        IMAGING:  As above     ASSESSMENT:  Intracranial hypotension, stable pseudomeningocele  Cervical myeloradiculopathy     Plan:  Plan for C4-7 ACDF and ligation of shunt by Dr. Reed        The plan of care was discussed with the patient at length. All questions and concerns were addressed at this time. The patient verbalized agreement with the plan and was appreciative.      Total visit time: 30 minutes; More than 50%  spent coordinating care, counseling, reviewing imaging and discussing medication therapy.      Josey Gutierrez, DO  Neurological Surgery  [facility]

## 2025-05-14 NOTE — H&P (VIEW-ONLY)
[facility]  Neurological Surgery Clinic Note     Юлия Charles  4/2/1960  [id number]  PCP: Patrizia Louise MD     REASON FOR VISIT:  R hand weakness and gait instability     HISTORY OF PRESENT ILLNESS:  Юлия Charles is a(n) 65 year old female who was originally seeing Dr. Reed for a possible CSF leak leading to headaches. She had a cerebellar mass removed and a LP shunt placed for persistent pseudomeningocele.  She has had headaches for quite some time.  She also complains of  weakness and gait instability.  MRI brain was done showing meningeal enhancement and a stable pseudomeningocele in the R cerebellar region.  MRI C spine was done as well showing severe spinal stenosis from C4-7 worse at C5-6 with cord compression but no myelomalacia.          Location: headaches, neck pain  Quality:  weakness and gait instability  Severity: progressive  Duration: months  Timing: any  Context: severe spinal stenosis with cord compression of C spine, meningeal enhancement on MR brain c/w possible leak  Modifying Factors: PT and meds offer no relief   Associated signs/symptoms: pain       PAST MEDICAL HISTORY:  Past Medical History       Past Medical History:    Arthritis    Back pain    Bloating    Blood in urine    Breast cancer, right breast (HCC)     Formatting of this note might be different from the original.  S/p b/l mastectomy  In 2008. On Aromacin      Calculus of kidney    Change in hair    Constipation    COPD (chronic obstructive pulmonary disease) (HCC)     No oxygen    Diarrhea, unspecified    Eye disease    Fatigue    Frequent urination    High cholesterol    History of blood transfusion     about 20 years ago    Intracranial tumor (HCC)    Irregular bowel habits    Pain in joints    Personal history of antineoplastic chemotherapy     Last treatment 2014    Stool incontinence    Stress    Visual impairment     glasses    Wears glasses            PAST SURGICAL HISTORY:  Past  Surgical History         Past Surgical History:   Procedure Laterality Date    Brain surgery        removal brain tumor             x2    Colonoscopy        Hysterectomy        Removal of kidney stone        Removal of lung,lobectomy        Tonsillectomy        Wom w/ cydney mastec or hx of mastec                FAMILY HISTORY:  family history includes Colon Polyps in her mother; Diabetes in her sister; Heart Disease in her father and sister; Substance Abuse in her son.     SOCIAL HISTORY:   reports that she quit smoking about 6 years ago. Her smoking use included cigarettes. She has never used smokeless tobacco. She reports that she does not currently use alcohol. She reports that she does not currently use drugs after having used the following drugs: Cocaine.     ALLERGIES:  [Allergies]    [Allergies]        Allergen Reactions    Amoxicillin NAUSEA ONLY       \"At least 20 years ago\"    Tamoxifen OTHER (SEE COMMENTS)       Very emotional           MEDICATIONS:  [Medications Ordered Prior to Encounter]    [Medications Ordered Prior to Encounter]         Current Outpatient Medications on File Prior to Visit   Medication Sig Dispense Refill    atorvastatin 10 MG Oral Tab TAKE 1 TABLET(10 MG) BY MOUTH EVERY NIGHT 90 tablet 0    ALBUTEROL 108 (90 Base) MCG/ACT Inhalation Aero Soln INHALE 2 PUFFS INTO THE LUNGS EVERY 4 HOURS AS NEEDED FOR WHEEZING OR SHORTNESS OF BREATH 8.5 g 0    Wheat Dextrin (BENEFIBER OR) Take by mouth as needed.        acetaminophen 325 MG Oral Tab Take 1 tablet (325 mg total) by mouth every 6 (six) hours as needed for Pain.        phenazopyridine (PYRIDIUM) 100 MG Oral Tab Take 1 tablet (100 mg total) by mouth 3 (three) times daily as needed for Pain. This will turn your urine orange. (Patient not taking: Reported on 4/3/2025) 10 tablet 0    Polyethylene Glycol 3350 (MIRALAX) 17 g Oral Powd Pack Take 17 g by mouth daily as needed (Take to avoid constipation, especially if taking narcotic  pain medications.). (Patient not taking: Reported on 3/4/2025) 20 packet 1      No current facility-administered medications on file prior to visit.        REVIEW OF SYSTEMS:  Review of Systems   All other systems reviewed and are negative.        PHYSICAL EXAMINATION:  Neurological Exam  Mental Status  Awake, alert and oriented to person, place and time.     Cranial Nerves  CN II: Visual acuity is normal. Visual fields full to confrontation.  CN III, IV, VI: Extraocular movements intact bilaterally. Normal lids and orbits bilaterally. Pupils equal round and reactive to light bilaterally.  CN V: Facial sensation is normal.  CN VII: Full and symmetric facial movement.  CN VIII: Hearing is normal.  CN IX, X: Palate elevates symmetrically. Normal gag reflex.  CN XI: Shoulder shrug strength is normal.  CN XII: Tongue midline without atrophy or fasciculations.     Motor  Normal muscle bulk throughout. No fasciculations present. Normal muscle tone. No abnormal involuntary movements. Strength is 5/5 in all four extremities except as noted.                                             Right                     Left   Interossei                              3+                          4-     Sensory  Sensation is intact to light touch, pinprick, vibration and proprioception in all four extremities.     Reflexes  Deep tendon reflexes are 2+ and symmetric in all four extremities.     Coordination  Right: Finger-to-nose abnormality:     Gait  Casual gait: Wide stance. Normal stride length. Ataxic gait.        IMAGING:  As above     ASSESSMENT:  Intracranial hypotension, stable pseudomeningocele  Cervical myeloradiculopathy     Plan:  Plan for C4-7 ACDF and ligation of shunt by Dr. Reed        The plan of care was discussed with the patient at length. All questions and concerns were addressed at this time. The patient verbalized agreement with the plan and was appreciative.      Total visit time: 30 minutes; More than 50%  spent coordinating care, counseling, reviewing imaging and discussing medication therapy.      Josey Gutierrez, DO  Neurological Surgery  [facility]

## 2025-05-14 NOTE — PROGRESS NOTES
Patient here for a one week pre op       SZ   anterior cervical 4- CERVICAL 5, cervical 5-CERVICAL 6, and cervical 6-CERVICAL 7 discectomy and fusion with cage, bone graft, plate/ screws; INTRAOPERATIVE NEURO MONITORING (BECCA) lumboperitoneal shunt ligation LEFT (HEIFERMAN) Right General   INTRAOPERATIVE NEURO MONITORING Right General        Panel 2    Surgeon Role   Amadeo Reed MD Primary    Procedure Laterality Anesthesia   lumboperitoneal shunt ligation Left

## 2025-05-15 ENCOUNTER — APPOINTMENT (OUTPATIENT)
Facility: LOCATION | Age: 65
End: 2025-05-15
Attending: NEUROLOGICAL SURGERY
Payer: MEDICARE

## 2025-05-16 ENCOUNTER — TELEPHONE (OUTPATIENT)
Dept: SURGERY | Facility: CLINIC | Age: 65
End: 2025-05-16

## 2025-05-16 ENCOUNTER — NURSE ONLY (OUTPATIENT)
Age: 65
End: 2025-05-16
Payer: MEDICARE

## 2025-05-16 DIAGNOSIS — Z71.9 ENCOUNTER FOR EDUCATION: Primary | ICD-10-CM

## 2025-05-16 NOTE — TELEPHONE ENCOUNTER
Patient was called per request of Dr. Gutierrez regarding possible ill symptoms. She is scheduled for surgery with Dr. Gutierrez on Tuesday, 5/20/25. No answer, LMTCB.  If patient returns call, okay for nursing to triage symptoms and determine need for surgery to be canceled on Tuesday.  Okay to reach out to me personally to endorse patient's complaints as well, and I will endorse this to Dr. Gutierrez.

## 2025-05-16 NOTE — PROGRESS NOTES
RN Spine Navigator Education for Юлия     If you have received instructions from your surgeon that are different from those listed below, please follow your physician's instructions.    You are scheduled for a Cervical fusion with Dr. Gutierrez on 5/20.      Patient Surgical Goals: Improve balance.     Before Your Surgery    Choose a Care Partner  Patient attended spine navigator visit independently.  Care partner is  and daughter. Your care partner(s) should be able to provide transportation to and from the hospital. They should be able to help at home for the first week after discharge, including helping you with meals, medication, and dressing changes.    Clearance Before Surgery 4/29  You will need to see your primary care provider within 30 days before surgery. Please make sure this appointment is at least a week before surgery as more testing or doctor visits may be ordered. Presurgical testing may include labs, nasal swab, imaging, EKG.   Make sure that you complete all preadmission testing so that surgery does not get delayed.    Home Environment  Assessed home status: home has stairs and bedroom and bathroom are on first floor. Suggested arrangements for help at home.  It is recommended that you prepare your home by putting clean sheets on your bed, freezing meals, and putting frequently used items at waist level.   Prevent falls by removing items that could cause you to trip, adding nightlights and adding a nonskid mat in shower.   Assistive devices can be purchased at a medical supply store or online including canes, walkers, toileting devices (commodes, raised toilet seats, toilet paper wiping aid), long handled sponge, shower chair or tub transfer bench, grabber/reacher tool, sock aid, long handled shoehorn, if needed.      Tobacco, Nicotine and Marijuana Use   Not applicable    Medications to Stop   For 7-10 days before surgery do not take any blood thinning medications. This includes  non-steroidal anti-inflammatories or NSAIDs (Advil, Aleve, Motrin, ibuprofen, naproxen, meloxicam, diclofenac, celecoxib, etc.), aspirin (unless told otherwise by your cardiologist or surgeon), herbal supplements and vitamins (garlic, turmeric, vitamin E, fish oil or krill oil, etc.). You may only take Tylenol or prescribed narcotic medication if needed for pain.   Other medications to stop include: none    Leading Up to Day of Surgery  Five days before surgery wash with Hibiclens soap after your regular body soap every day. Do not put use Hibiclens on your face, hair or privates. Your last shower should be the night before surgery.  One-two business days before surgery, the preadmission testing staff will call you and let you know what time to arrive, where to park, when to take your Tylenol and Gatorade, and will provide any additional instructions.   After 11pm the day before surgery, do not eat or drink anything (including water, gum, or candies) except for Tylenol and Gatorade.   Drink 12 ounces of regular Gatorade (NOT RED) 12 hours prior to your scheduled surgery time. Drink your second 12 ounces of regular Gatorade and take 1000 mg of Tylenol (acetaminophen) 4 hours before your scheduled surgery time.     Items to Bring to the Hospital   Bring insurance card, ID, advanced directive, or medical power of  paperwork, loose fitting clothing, shoes with a back that can accommodate swollen feet, long phone charging cord, glasses and brace.  Do not bring jewelry, valuables, or medications.   If you take an uncommon medication that the hospital may not have, it must be brought to the hospital in the original container, and you must notify the nurse of this medication.     In the Hospital     Operative Day and Hospital Stay  In the preoperative area, you will change into a gown, have an IV placed in your hand or arm by the nurse, and sign any consent paperwork that is needed for your procedure. You will speak  to the surgeon and anesthesiologist. It is important to tell the doctors and nurses if you have had any significant side effects from pain medications or anesthesia such as a rash or severe nausea.    In the operating room, the anesthesiologist will attach monitors, give you oxygen through a mask, and give you medicine through the IV to fall asleep. After you are sleeping, the breathing tube will be placed. The surgeon may use additional nerve monitoring during your surgery. This is to make sure that the muscles and nerves in your arms and legs are working normally as he operates. The equipment will be hooked up and removed while you are asleep. You will wake up on the hospital bed.     During the surgery, your care partner can sit in the surgical waiting area. There are TV screens in that area that keep them informed of your progress. If the procedure is at [facility], there is a person who can speak to them about your surgical progress.     In the recovery room, monitors will be attached that check your heart rate, blood pressure and oxygen levels. While you may not remember this part, a nurse is with you and constantly checking on your condition. Medications for pain and nausea will be given if you need them. You may have a guan catheter to empty your bladder or a drain at your surgical site. Your family is not allowed in the recovery room. When you are ready to leave the recovery room, you will be transported on your bed to the inpatient unit accompanied by your family once a room is available.  On the inpatient unit, a team of doctors and advanced practice providers will manage your care. The spine care nurses will check your blood pressure, temperature, heartbeat, breathing, stomach sounds and your incision. They may assess the strength and sensation in your arms and legs. Medications that are given in the hospital include antibiotics, IV fluids, pain medications, muscle relaxers, stool softeners, and your home  medications. You may get blood drawn and another x-ray. Physical and occupational therapists may come to your room to teach you how to move around safely after surgery.     Post Op Plan   The average length of hospital stay is one to two days. A  may visit you to help arrange extra care for you once you leave the hospital. Occasionally, it is recommended that a home health nurse or therapist visit you in your home for a short time. The best place to recover is your own home, but if you need more assistance than home health and your care partner can provide, the  will help you and your family choose other facilities to help you recover your strength.    Preventing surgical complications  It is important to follow all instructions before and after surgery to decrease the risks of surgery and prevent complications.     Blood clots: walk, wear leg compression devices in the hospital, and do ankle pumps at home  Infection: wash with Hibiclens before surgery, wash your hands, don't touch your incision  Pneumonia: take deep breaths and cough and use the breathing exercise (incentive spirometer)   Nausea/vomiting: start with liquids and small meals and do not take pills on an empty stomach  Constipation: drink water and walk frequently  If you are prone to constipation, start an over the counter stool softener while taking your narcotic medication.  After surgery if you have gone 3-4 days without a bowel movement, we recommend you use either a suppository or an enema (follow the packaging instructions for use). The longer you stay constipated the more painful and difficult it will be to relieve your constipation.    Tell your nurse if you are experiencing nausea, vomiting or constipation as they have medications to help treat these.      At home     Understanding Pain After Surgery  We will do our best to manage your pain after surgery, but it is not possible to be completely pain-free. There will be  operative pain in your back or neck. Pain in the arms or legs may be one of the first things to improve. Numbness, weakness, and tingling should improve over time. However, there can be a temporary increase in symptoms in the first few days due to inflammation from surgery.   Pain medications will be prescribed to take home at discharge. The goal of pain medicine after surgery is to make your pain tolerable, not to make you pain free. We encourage you to use the medication prescribed to you after your surgery, but please take the lowest possible dose to manage your pain. Taking high doses of narcotics can cause side effects. Transition to plain Tylenol when your pain improves. At [facility], your prescriptions can be filled by our pharmacy and brought to you bedside. You may get more continuous pain relief by alternating between medications if you have multiple instead of taking them at the same time. Write down when you have taken a medication as it may be difficult to remember after a few doses.    Post operative medication   Tylenol (acetaminophen): take for pain. Do not take more than 3000 mg - 4000 mg in 24 hours because it can damage your liver.   Narcotics: take for moderate to severe pain. Do not drink alcohol or drive while taking narcotics. Some narcotic medications (Norco, Tylenol #3, Percocet, Vicodin) contain Tylenol (acetaminophen). Make sure to not exceed the maximum dose if you are taking additional Tylenol with these medications.  Muscle relaxers: take for muscle cramping. These can cause drowsiness.    NSAIDS (Advil, Aleve, Motrin, ibuprofen, naproxen, meloxicam, diclofenac, celecoxib, etc.) or aspirin: Do not take these unless your physician says it is ok. For a fusion, it may be several months before you can take NSAIDS.  Stool softeners: take to prevent constipation while you are taking narcotic medications. You can get these over the counter at the pharmacy. You may use laxatives, which  are stronger, if needed.    If you believe you will need more of medication your surgeon has prescribed to you, request a refill through your pharmacy or through the refill request in [employer] (log in, go to medications, then select refill request) at least two business days before you run out of medication.     Nonpharmacologic pain management   You may use ice on your incision for 20 minutes every hour to help with pain and swelling. Do not place ice directly on your skin. You can use heat to sooth your muscles but avoid placing heat directly on your incision. Make frequent position changes. You can do gentle stretching while avoiding significant bending or twisting. Use deep breathing techniques and distractions such as TV, music, reading, or games.   Additional Recommendations for Anterior Cervical Surgery  Smoothies or protein shakes may be more comfortable to consume then solid food for the first week after surgery. To help decrease throat swelling, suck on ice chips and drink cold liquids. Cough drops can also be help decrease throat irritation.    Movement restrictions  After surgery, no bending or twisting. Do not lift anything over 10lbs (about the weight of a gallon of milk) or lift anything over your shoulders. Avoid pulling or pushing. You may use stairs while holding the handrail.  It is ok to ride in the car but refrain from driving or traveling long distances until cleared to do so by your surgeon. You may not drive while taking narcotics or muscle relaxants. If you have cervical surgery, it may be several weeks before you can drive. , If you had a fracture or fusion surgery, your doctor may give you a brace. Braces are worn for comfort, when up and moving around, or constantly depending on your doctor's order. Wear your brace as instructed.     Post Op Exercise   Walk frequently. Start with walking short distances and increase as you start to feel better. Do ankle pumps (bending at your ankles, bring  your feet towards your head then point them towards the ground) 15-20 times every hour when awake to help prevent blood clots.     Your surgeon will let you know at your post operative appointment if you are ready to decrease your movement restrictions and increase your exercise. If you have questions in between appointments about lessening your restrictions, please contact the office.     You and your doctor will discuss how you are feeling as you heal and decide if outpatient physical therapy or a medical fitness referral is needed.    Caring for your incision  Always wash your hands before touching your incision. Your incision will be closed with sutures under the skin and skin glue or Steri-Strips (thin white bandages) on top of the skin. Do not attempt to peal off Skin glue/Steri-Strips as they will come off on their own. If the incision is closed with sutures or staples on top of the skin, they will be removed at a post op appointment. The incision may be covered with a gauze dressing that can come off after three days. Once the original gauze dressing is removed, we prefer that you leave your incision open to air (without a gauze dressing). If the incision has drainage or is rubbing against your clothes or brace, you may place gauze and medical tape over it. Change the gauze and medical tape daily. Look at your incision daily to check for signs of infection.   You can shower three days after your surgery or sooner if your surgeon allows. We recommend the care partner be present during the first shower for safety. Let soapy water run over the incision, but do not scrub it or spray it directly. Gently pat it dry after with a clean towel. Do not apply any creams or lotions to the incision. Do not soak in a tub, pool, or any body of water until your incision is fully healed.    Signs of Infection   Check your incision daily for swelling, redness, drainage, pus, bad smell, or opening of the incision.     When to  Call for Assistance  Call the Neurosurgery Office ([phone] Option #2) if you experience signs of infection, opening of the incision, continuous nausea or vomiting, poor pain control despite using the pain medication as directed, a sudden increase in pain, temperature over 101F, or with any concerns, unanswered questions, or new problems, such as new numbness/weakness/tingling.  Call 911 or go to the nearest emergency room if you experience chest pain, difficulty breathing, loss of bowel or bladder control, extreme drowsiness, or any other life-threatening situation.     Please remember that the office of Dr. Gutierrez is closed on the weekends, holidays, and there is no one in the office from 4:30 pm to 8 am. If you have an urgent matter that needs attention you will need to go to the emergency room or immediate care for assistance. If it is an urgent matter during work hours please make sure to call the office of Dr. Gutierrez as [employer] messages are not meant for Urgent/Emergent situations. [employer] messages can take 5-7 days for a response.      Follow-up Plan   Appointments with Dr. Gutierrez or Alma Delia at 1, 4 and 12 weeks    Questions: reach out to office regarding cough     If you would like clarification regarding anything discussed today, you can call your surgeons office and speak with one of the nursing staff. If you feel you require and additional appointment to review information again you can call the RN Spine Navigator at [phone] #4 to schedule. If you have questions about your upcoming surgery, changes in your symptoms, or if you need to refill your medications please do not call the RN spine navigator, you will need to speak with someone from your surgeons office.     Spine navigator spent 38 minutes conducting a virtual visit to provide education. Thank you for letting the RN Spine Navigator participate in your care.

## 2025-05-16 NOTE — TELEPHONE ENCOUNTER
Attempted to call patient. Left message to call back.     Patient is not active on MyChart. Unable to send MC message.

## 2025-05-16 NOTE — TELEPHONE ENCOUNTER
Dr. Gutierrez requested staff call patient today 5/16/25 to see how she is feeling/doing. Patient had a head cold while at appointment on 5/14 and is scheduled for surgery on 5/20.    Patient may want/need to re-schedule procedure.    Routed to Nursing pool to conduct outreach.

## 2025-05-19 ENCOUNTER — LAB ENCOUNTER (OUTPATIENT)
Dept: LAB | Age: 65
End: 2025-05-19
Attending: Other
Payer: MEDICARE

## 2025-05-19 ENCOUNTER — APPOINTMENT (OUTPATIENT)
Facility: LOCATION | Age: 65
End: 2025-05-19
Attending: NEUROLOGICAL SURGERY
Payer: MEDICARE

## 2025-05-19 DIAGNOSIS — E87.6 LOW BLOOD POTASSIUM: ICD-10-CM

## 2025-05-19 LAB
ANION GAP SERPL CALC-SCNC: 6 MMOL/L (ref 0–18)
BUN BLD-MCNC: 17 MG/DL (ref 9–23)
CALCIUM BLD-MCNC: 9.8 MG/DL (ref 8.7–10.6)
CHLORIDE SERPL-SCNC: 106 MMOL/L (ref 98–112)
CO2 SERPL-SCNC: 27 MMOL/L (ref 21–32)
CREAT BLD-MCNC: 0.64 MG/DL (ref 0.55–1.02)
EGFRCR SERPLBLD CKD-EPI 2021: 97 ML/MIN/1.73M2 (ref 60–?)
FASTING STATUS PATIENT QL REPORTED: YES
GLUCOSE BLD-MCNC: 98 MG/DL (ref 70–99)
OSMOLALITY SERPL CALC.SUM OF ELEC: 290 MOSM/KG (ref 275–295)
POTASSIUM SERPL-SCNC: 3.4 MMOL/L (ref 3.5–5.1)
SODIUM SERPL-SCNC: 139 MMOL/L (ref 136–145)

## 2025-05-19 PROCEDURE — 80048 BASIC METABOLIC PNL TOTAL CA: CPT

## 2025-05-19 PROCEDURE — 36415 COLL VENOUS BLD VENIPUNCTURE: CPT

## 2025-05-19 NOTE — TELEPHONE ENCOUNTER
Patient is re-scheduled for C4-C7 ACDF with ligation of  Shunt on 5.29.25 with Dr Gutierrez.    Case change request sent- Y  Changed on surgery sheet-Y  Changed on outlook calendar-Y  Mychart message sent to patient with new sx date-NA- no MyChart  Reminded of need for PCP clearance-NA- received  Post-op appointments changed Y  PA Needed. Routed to PA team with sx date change NA- Medicare  Email sent to Epic Surg with changes Y  New post-op outreach pt reminder placed Y

## 2025-05-19 NOTE — PAT NURSING NOTE
Called Dr. Gutierrez office and notified Sadie that PAT has not received Medical Clearance for tomorrow's surgery and that patient has not completed a repeat BMP.

## 2025-05-19 NOTE — TELEPHONE ENCOUNTER
Per Dr Gutierrez, in light of the patients illness for >1 week and pending cervical spine surgery, he would like to reschedule her surgery to a later date to allow for recovery and now need for repeat K level.

## 2025-05-19 NOTE — PAT NURSING NOTE
Called patient and left message requesting return call.  Patient needed repeat blood test prior to surgery tomorrow (need Medical Clearance).

## 2025-05-19 NOTE — TELEPHONE ENCOUNTER
Called and spoke to patient  Surgery is scheduled for tomorrow  Heading to our office now to repeat BMP  Ordered BMP as STAT

## 2025-05-19 NOTE — TELEPHONE ENCOUNTER
Basilia Per pre admission called said patient has surgery scheduled today at 12:30 but notes need to reflect update lab that was ordered and  updated noted.

## 2025-05-19 NOTE — PAT NURSING NOTE
Called PCP Dr. Louise office and spoke with Kaitlin.  Need updated Medical Clearance as patient was to have a repeat BMP prior to procedure.  Kaitlin to reach out to patient and follow up with PAT.

## 2025-05-19 NOTE — TELEPHONE ENCOUNTER
Received a call from [facility]. Patient needs a repeat potassium level to be cleared for surgery. Called to patient to tell her this needs to be drawn today. Patient let me know she had a cough x 1 week, no other symptoms.    Discussed with Dr. Gutierrez, he would like patient's surgery to be rescheduled. There is availability on 5/29/25. Called and left patient a message to reschedule her, no answer, I asked that she call the office back so we can inform her of the new date. Patient will still need to repeat potassium prior to surgery.

## 2025-05-20 ENCOUNTER — APPOINTMENT (OUTPATIENT)
Dept: ULTRASOUND IMAGING | Facility: HOSPITAL | Age: 65
DRG: 473 | End: 2025-05-20
Attending: NEUROLOGICAL SURGERY
Payer: MEDICARE

## 2025-05-20 ENCOUNTER — APPOINTMENT (OUTPATIENT)
Dept: ULTRASOUND IMAGING | Facility: HOSPITAL | Age: 65
End: 2025-05-20
Attending: NEUROLOGICAL SURGERY
Payer: MEDICARE

## 2025-05-20 ENCOUNTER — ANESTHESIA (OUTPATIENT)
Dept: SURGERY | Facility: HOSPITAL | Age: 65
End: 2025-05-20
Payer: MEDICARE

## 2025-05-20 ENCOUNTER — TELEPHONE (OUTPATIENT)
Dept: FAMILY MEDICINE CLINIC | Facility: CLINIC | Age: 65
End: 2025-05-20

## 2025-05-20 NOTE — TELEPHONE ENCOUNTER
Patient advised of results. Aware to CSM and follow up as scheduled. She notes her upcoming surgery was postponed until 5/29 d/t URI

## 2025-05-20 NOTE — TELEPHONE ENCOUNTER
----- Message from Patrizia Louise sent at 5/20/2025  4:42 AM CDT -----  Potassium level improved   ----- Message -----  From: Lab, Background User  Sent: 5/19/2025  11:51 AM CDT  To: Patrizia Louise MD

## 2025-05-21 ENCOUNTER — TELEPHONE (OUTPATIENT)
Dept: FAMILY MEDICINE CLINIC | Facility: CLINIC | Age: 65
End: 2025-05-21

## 2025-05-21 DIAGNOSIS — E87.6 LOW BLOOD POTASSIUM: Primary | ICD-10-CM

## 2025-05-21 NOTE — TELEPHONE ENCOUNTER
Request from preadmissions for addended pre op note that states cleared for surgery and repeated potassium level  Surgery rescheduled for 5/29  Please advise

## 2025-05-21 NOTE — TELEPHONE ENCOUNTER
Updated PCP presurgical clearance note dated 5.5.25 in chart states, \"Recent labs reviewed; CMP shows hypokalemia - replete potassium prior to surgery, CXR is normal, Patient is cleared for surgery    \"Pt started on potassium supplements for hypokalemia. Repeat BMP shows K improved from 3.2 to 3.4\"    Faxed to [facility]

## 2025-05-29 ENCOUNTER — HOSPITAL ENCOUNTER (INPATIENT)
Facility: HOSPITAL | Age: 65
LOS: 1 days | Discharge: HOME | DRG: 473 | End: 2025-05-30
Attending: NEUROLOGICAL SURGERY | Admitting: NEUROLOGICAL SURGERY
Payer: MEDICARE

## 2025-05-29 ENCOUNTER — HOSPITAL ENCOUNTER (INPATIENT)
Facility: HOSPITAL | Age: 65
LOS: 1 days | Discharge: HOME | End: 2025-05-30
Attending: NEUROLOGICAL SURGERY | Admitting: NEUROLOGICAL SURGERY
Payer: MEDICARE

## 2025-05-29 ENCOUNTER — APPOINTMENT (OUTPATIENT)
Dept: GENERAL RADIOLOGY | Facility: HOSPITAL | Age: 65
DRG: 473 | End: 2025-05-29
Attending: NEUROLOGICAL SURGERY
Payer: MEDICARE

## 2025-05-29 ENCOUNTER — APPOINTMENT (OUTPATIENT)
Dept: ULTRASOUND IMAGING | Facility: HOSPITAL | Age: 65
DRG: 473 | End: 2025-05-29
Attending: NEUROLOGICAL SURGERY
Payer: MEDICARE

## 2025-05-29 ENCOUNTER — APPOINTMENT (OUTPATIENT)
Dept: ULTRASOUND IMAGING | Facility: HOSPITAL | Age: 65
End: 2025-05-29
Attending: NEUROLOGICAL SURGERY
Payer: MEDICARE

## 2025-05-29 ENCOUNTER — APPOINTMENT (OUTPATIENT)
Dept: GENERAL RADIOLOGY | Facility: HOSPITAL | Age: 65
End: 2025-05-29
Attending: NEUROLOGICAL SURGERY
Payer: MEDICARE

## 2025-05-29 DIAGNOSIS — G96.810 INTRACRANIAL HYPOTENSION: ICD-10-CM

## 2025-05-29 DIAGNOSIS — Z98.1 S/P CERVICAL SPINAL FUSION: ICD-10-CM

## 2025-05-29 DIAGNOSIS — G95.9 CERVICAL MYELOPATHY WITH CERVICAL RADICULOPATHY (HCC): Primary | ICD-10-CM

## 2025-05-29 DIAGNOSIS — Z01.818 PRE-OP TESTING: ICD-10-CM

## 2025-05-29 DIAGNOSIS — M54.12 CERVICAL MYELOPATHY WITH CERVICAL RADICULOPATHY (HCC): Primary | ICD-10-CM

## 2025-05-29 LAB — RH BLOOD TYPE: NEGATIVE

## 2025-05-29 PROCEDURE — 76000 FLUOROSCOPY <1 HR PHYS/QHP: CPT | Performed by: NEUROLOGICAL SURGERY

## 2025-05-29 PROCEDURE — 0RG20A0 FUSION OF 2 OR MORE CERVICAL VERTEBRAL JOINTS WITH INTERBODY FUSION DEVICE, ANTERIOR APPROACH, ANTERIOR COLUMN, OPEN APPROACH: ICD-10-PCS | Performed by: NEUROLOGICAL SURGERY

## 2025-05-29 PROCEDURE — 4A11X4G MONITORING OF PERIPHERAL NERVOUS ELECTRICAL ACTIVITY, INTRAOPERATIVE, EXTERNAL APPROACH: ICD-10-PCS | Performed by: NEUROLOGICAL SURGERY

## 2025-05-29 PROCEDURE — 22551 ARTHRD ANT NTRBDY CERVICAL: CPT | Performed by: NEUROLOGICAL SURGERY

## 2025-05-29 PROCEDURE — 22846 INSERT SPINE FIXATION DEVICE: CPT | Performed by: NEUROLOGICAL SURGERY

## 2025-05-29 PROCEDURE — 99223 1ST HOSP IP/OBS HIGH 75: CPT | Performed by: HOSPITALIST

## 2025-05-29 PROCEDURE — 0WPG00Z REMOVAL OF DRAINAGE DEVICE FROM PERITONEAL CAVITY, OPEN APPROACH: ICD-10-PCS | Performed by: NEUROLOGICAL SURGERY

## 2025-05-29 PROCEDURE — 0RT30ZZ RESECTION OF CERVICAL VERTEBRAL DISC, OPEN APPROACH: ICD-10-PCS | Performed by: NEUROLOGICAL SURGERY

## 2025-05-29 PROCEDURE — 00NW0ZZ RELEASE CERVICAL SPINAL CORD, OPEN APPROACH: ICD-10-PCS | Performed by: NEUROLOGICAL SURGERY

## 2025-05-29 PROCEDURE — 22853 INSJ BIOMECHANICAL DEVICE: CPT | Performed by: NEUROLOGICAL SURGERY

## 2025-05-29 PROCEDURE — 22552 ARTHRD ANT NTRBD CERVICAL EA: CPT | Performed by: NEUROLOGICAL SURGERY

## 2025-05-29 DEVICE — 6MM, LOCKING COVER
Type: IMPLANTABLE DEVICE | Site: SPINE CERVICAL | Status: FUNCTIONAL
Brand: SHORELINE ACS

## 2025-05-29 DEVICE — 4.0MM VARIABLE SCREW, SELF TAPPING, 14MM
Type: IMPLANTABLE DEVICE | Site: SPINE CERVICAL | Status: FUNCTIONAL
Brand: SHORELINE ACS

## 2025-05-29 DEVICE — 3.5MM VARIABLE SCREW, SELF DRILLING, 14MM
Type: IMPLANTABLE DEVICE | Site: SPINE CERVICAL | Status: FUNCTIONAL
Brand: SHORELINE ACS

## 2025-05-29 DEVICE — INTERBODY, 14X13X6MM, 7 DEGREE, RT
Type: IMPLANTABLE DEVICE | Site: SPINE CERVICAL | Status: FUNCTIONAL
Brand: SHORELINE RT

## 2025-05-29 DEVICE — 6MM, 3-HOLE ANTERIOR PLATE
Type: IMPLANTABLE DEVICE | Site: SPINE CERVICAL | Status: FUNCTIONAL
Brand: SHORELINE ACS

## 2025-05-29 DEVICE — OSTEOCOVE PUTTY, 5CC
Type: IMPLANTABLE DEVICE | Site: SPINE CERVICAL | Status: FUNCTIONAL
Brand: OSTEOCOVE

## 2025-05-29 DEVICE — 6MM, 4-HOLE ANTERIOR PLATE
Type: IMPLANTABLE DEVICE | Site: SPINE CERVICAL | Status: FUNCTIONAL
Brand: SHORELINE ACS

## 2025-05-29 RX ORDER — DIPHENHYDRAMINE HCL 25 MG
25 CAPSULE ORAL EVERY 4 HOURS PRN
Status: DISCONTINUED | OUTPATIENT
Start: 2025-05-29 | End: 2025-05-30

## 2025-05-29 RX ORDER — ACETAMINOPHEN 10 MG/ML
1000 INJECTION, SOLUTION INTRAVENOUS EVERY 6 HOURS
Status: DISPENSED | OUTPATIENT
Start: 2025-05-29 | End: 2025-05-30

## 2025-05-29 RX ORDER — HYDROMORPHONE HYDROCHLORIDE 1 MG/ML
0.4 INJECTION, SOLUTION INTRAMUSCULAR; INTRAVENOUS; SUBCUTANEOUS EVERY 5 MIN PRN
Status: DISCONTINUED | OUTPATIENT
Start: 2025-05-29 | End: 2025-05-29 | Stop reason: HOSPADM

## 2025-05-29 RX ORDER — HYDROMORPHONE HYDROCHLORIDE 1 MG/ML
0.2 INJECTION, SOLUTION INTRAMUSCULAR; INTRAVENOUS; SUBCUTANEOUS EVERY 5 MIN PRN
Status: DISCONTINUED | OUTPATIENT
Start: 2025-05-29 | End: 2025-05-29 | Stop reason: HOSPADM

## 2025-05-29 RX ORDER — DIPHENHYDRAMINE HYDROCHLORIDE 50 MG/ML
12.5 INJECTION, SOLUTION INTRAMUSCULAR; INTRAVENOUS EVERY 4 HOURS PRN
Status: DISCONTINUED | OUTPATIENT
Start: 2025-05-29 | End: 2025-05-30

## 2025-05-29 RX ORDER — METHOCARBAMOL 500 MG/1
500 TABLET, FILM COATED ORAL EVERY 6 HOURS PRN
Status: DISCONTINUED | OUTPATIENT
Start: 2025-05-29 | End: 2025-05-30

## 2025-05-29 RX ORDER — BISACODYL 10 MG
10 SUPPOSITORY, RECTAL RECTAL
Status: DISCONTINUED | OUTPATIENT
Start: 2025-05-29 | End: 2025-05-30

## 2025-05-29 RX ORDER — LIDOCAINE HYDROCHLORIDE 10 MG/ML
INJECTION, SOLUTION EPIDURAL; INFILTRATION; INTRACAUDAL; PERINEURAL AS NEEDED
Status: DISCONTINUED | OUTPATIENT
Start: 2025-05-29 | End: 2025-05-29 | Stop reason: SURG

## 2025-05-29 RX ORDER — NALOXONE HYDROCHLORIDE 0.4 MG/ML
0.08 INJECTION, SOLUTION INTRAMUSCULAR; INTRAVENOUS; SUBCUTANEOUS
Status: DISCONTINUED | OUTPATIENT
Start: 2025-05-29 | End: 2025-05-30

## 2025-05-29 RX ORDER — SODIUM CHLORIDE, SODIUM LACTATE, POTASSIUM CHLORIDE, CALCIUM CHLORIDE 600; 310; 30; 20 MG/100ML; MG/100ML; MG/100ML; MG/100ML
INJECTION, SOLUTION INTRAVENOUS CONTINUOUS
Status: DISCONTINUED | OUTPATIENT
Start: 2025-05-29 | End: 2025-05-30

## 2025-05-29 RX ORDER — METHOCARBAMOL 100 MG/ML
INJECTION, SOLUTION INTRAMUSCULAR; INTRAVENOUS
Status: COMPLETED
Start: 2025-05-29 | End: 2025-05-29

## 2025-05-29 RX ORDER — ROCURONIUM BROMIDE 10 MG/ML
INJECTION, SOLUTION INTRAVENOUS AS NEEDED
Status: DISCONTINUED | OUTPATIENT
Start: 2025-05-29 | End: 2025-05-29 | Stop reason: SURG

## 2025-05-29 RX ORDER — BENZOCAINE/MENTHOL 6 MG-10 MG
LOZENGE MUCOUS MEMBRANE 2 TIMES DAILY
Status: DISCONTINUED | OUTPATIENT
Start: 2025-05-29 | End: 2025-05-30

## 2025-05-29 RX ORDER — DIAZEPAM 10 MG/2ML
2.5 INJECTION, SOLUTION INTRAMUSCULAR; INTRAVENOUS ONCE
Status: DISCONTINUED | OUTPATIENT
Start: 2025-05-29 | End: 2025-05-29 | Stop reason: HOSPADM

## 2025-05-29 RX ORDER — NALOXONE HYDROCHLORIDE 0.4 MG/ML
0.08 INJECTION, SOLUTION INTRAMUSCULAR; INTRAVENOUS; SUBCUTANEOUS AS NEEDED
Status: DISCONTINUED | OUTPATIENT
Start: 2025-05-29 | End: 2025-05-29 | Stop reason: HOSPADM

## 2025-05-29 RX ORDER — SENNOSIDES 8.6 MG
17.2 TABLET ORAL NIGHTLY
Status: DISCONTINUED | OUTPATIENT
Start: 2025-05-29 | End: 2025-05-30

## 2025-05-29 RX ORDER — SODIUM CHLORIDE 9 MG/ML
INJECTION, SOLUTION INTRAVENOUS CONTINUOUS
Status: DISCONTINUED | OUTPATIENT
Start: 2025-05-29 | End: 2025-05-30

## 2025-05-29 RX ORDER — ACETAMINOPHEN 500 MG
1000 TABLET ORAL ONCE AS NEEDED
Status: DISCONTINUED | OUTPATIENT
Start: 2025-05-29 | End: 2025-05-29 | Stop reason: HOSPADM

## 2025-05-29 RX ORDER — KETOROLAC TROMETHAMINE 15 MG/ML
15 INJECTION, SOLUTION INTRAMUSCULAR; INTRAVENOUS EVERY 6 HOURS PRN
Status: DISCONTINUED | OUTPATIENT
Start: 2025-05-29 | End: 2025-05-30

## 2025-05-29 RX ORDER — SODIUM CHLORIDE, SODIUM LACTATE, POTASSIUM CHLORIDE, CALCIUM CHLORIDE 600; 310; 30; 20 MG/100ML; MG/100ML; MG/100ML; MG/100ML
INJECTION, SOLUTION INTRAVENOUS CONTINUOUS
Status: DISCONTINUED | OUTPATIENT
Start: 2025-05-29 | End: 2025-05-29 | Stop reason: HOSPADM

## 2025-05-29 RX ORDER — ONDANSETRON 2 MG/ML
4 INJECTION INTRAMUSCULAR; INTRAVENOUS EVERY 6 HOURS PRN
Status: DISCONTINUED | OUTPATIENT
Start: 2025-05-29 | End: 2025-05-30

## 2025-05-29 RX ORDER — METOCLOPRAMIDE HYDROCHLORIDE 5 MG/ML
10 INJECTION INTRAMUSCULAR; INTRAVENOUS EVERY 8 HOURS PRN
Status: DISCONTINUED | OUTPATIENT
Start: 2025-05-29 | End: 2025-05-30

## 2025-05-29 RX ORDER — DEXAMETHASONE SODIUM PHOSPHATE 4 MG/ML
4 VIAL (ML) INJECTION EVERY 6 HOURS
Status: COMPLETED | OUTPATIENT
Start: 2025-05-29 | End: 2025-05-30

## 2025-05-29 RX ORDER — DOCUSATE SODIUM 100 MG/1
100 CAPSULE, LIQUID FILLED ORAL 2 TIMES DAILY
Status: DISCONTINUED | OUTPATIENT
Start: 2025-05-29 | End: 2025-05-30

## 2025-05-29 RX ORDER — METHOCARBAMOL 100 MG/ML
1000 INJECTION, SOLUTION INTRAMUSCULAR; INTRAVENOUS ONCE
Status: COMPLETED | OUTPATIENT
Start: 2025-05-29 | End: 2025-05-29

## 2025-05-29 RX ORDER — DIAZEPAM 5 MG/1
5 TABLET ORAL EVERY 6 HOURS PRN
Status: DISCONTINUED | OUTPATIENT
Start: 2025-05-29 | End: 2025-05-30

## 2025-05-29 RX ORDER — DEXAMETHASONE SODIUM PHOSPHATE 4 MG/ML
VIAL (ML) INJECTION AS NEEDED
Status: DISCONTINUED | OUTPATIENT
Start: 2025-05-29 | End: 2025-05-29 | Stop reason: SURG

## 2025-05-29 RX ORDER — ACETAMINOPHEN 10 MG/ML
INJECTION, SOLUTION INTRAVENOUS
Status: COMPLETED
Start: 2025-05-29 | End: 2025-05-29

## 2025-05-29 RX ORDER — ONDANSETRON 2 MG/ML
4 INJECTION INTRAMUSCULAR; INTRAVENOUS EVERY 6 HOURS PRN
Status: DISCONTINUED | OUTPATIENT
Start: 2025-05-29 | End: 2025-05-29 | Stop reason: SDUPTHER

## 2025-05-29 RX ORDER — POLYETHYLENE GLYCOL 3350 17 G/17G
17 POWDER, FOR SOLUTION ORAL DAILY PRN
Status: DISCONTINUED | OUTPATIENT
Start: 2025-05-29 | End: 2025-05-30

## 2025-05-29 RX ORDER — ACETAMINOPHEN 500 MG
1000 TABLET ORAL ONCE
Status: DISCONTINUED | OUTPATIENT
Start: 2025-05-29 | End: 2025-05-29 | Stop reason: HOSPADM

## 2025-05-29 RX ORDER — METOCLOPRAMIDE HYDROCHLORIDE 5 MG/ML
10 INJECTION INTRAMUSCULAR; INTRAVENOUS EVERY 8 HOURS PRN
Status: DISCONTINUED | OUTPATIENT
Start: 2025-05-29 | End: 2025-05-29 | Stop reason: HOSPADM

## 2025-05-29 RX ORDER — ATORVASTATIN CALCIUM 10 MG/1
10 TABLET, FILM COATED ORAL NIGHTLY
Status: DISCONTINUED | OUTPATIENT
Start: 2025-05-29 | End: 2025-05-30

## 2025-05-29 RX ORDER — ONDANSETRON 2 MG/ML
4 INJECTION INTRAMUSCULAR; INTRAVENOUS EVERY 6 HOURS PRN
Status: DISCONTINUED | OUTPATIENT
Start: 2025-05-29 | End: 2025-05-29 | Stop reason: HOSPADM

## 2025-05-29 RX ORDER — HYDROCODONE BITARTRATE AND ACETAMINOPHEN 5; 325 MG/1; MG/1
1 TABLET ORAL ONCE AS NEEDED
Status: DISCONTINUED | OUTPATIENT
Start: 2025-05-29 | End: 2025-05-29 | Stop reason: HOSPADM

## 2025-05-29 RX ORDER — MIDAZOLAM HYDROCHLORIDE 1 MG/ML
INJECTION INTRAMUSCULAR; INTRAVENOUS AS NEEDED
Status: DISCONTINUED | OUTPATIENT
Start: 2025-05-29 | End: 2025-05-29 | Stop reason: SURG

## 2025-05-29 RX ORDER — ONDANSETRON 2 MG/ML
INJECTION INTRAMUSCULAR; INTRAVENOUS AS NEEDED
Status: DISCONTINUED | OUTPATIENT
Start: 2025-05-29 | End: 2025-05-29 | Stop reason: SURG

## 2025-05-29 RX ORDER — HYDROMORPHONE HYDROCHLORIDE 1 MG/ML
0.6 INJECTION, SOLUTION INTRAMUSCULAR; INTRAVENOUS; SUBCUTANEOUS EVERY 5 MIN PRN
Status: DISCONTINUED | OUTPATIENT
Start: 2025-05-29 | End: 2025-05-29 | Stop reason: HOSPADM

## 2025-05-29 RX ORDER — ALBUTEROL SULFATE 90 UG/1
2 INHALANT RESPIRATORY (INHALATION) EVERY 4 HOURS PRN
Status: DISCONTINUED | OUTPATIENT
Start: 2025-05-29 | End: 2025-05-30

## 2025-05-29 RX ORDER — LIDOCAINE HYDROCHLORIDE AND EPINEPHRINE 20; 5 MG/ML; UG/ML
INJECTION, SOLUTION EPIDURAL; INFILTRATION; INTRACAUDAL; PERINEURAL AS NEEDED
Status: DISCONTINUED | OUTPATIENT
Start: 2025-05-29 | End: 2025-05-29 | Stop reason: HOSPADM

## 2025-05-29 RX ORDER — SODIUM PHOSPHATE, DIBASIC AND SODIUM PHOSPHATE, MONOBASIC 7; 19 G/230ML; G/230ML
1 ENEMA RECTAL ONCE AS NEEDED
Status: DISCONTINUED | OUTPATIENT
Start: 2025-05-29 | End: 2025-05-30

## 2025-05-29 RX ORDER — DIPHENHYDRAMINE HYDROCHLORIDE 50 MG/ML
25 INJECTION, SOLUTION INTRAMUSCULAR; INTRAVENOUS EVERY 4 HOURS PRN
Status: DISCONTINUED | OUTPATIENT
Start: 2025-05-29 | End: 2025-05-30

## 2025-05-29 RX ORDER — KETOROLAC TROMETHAMINE 30 MG/ML
30 INJECTION, SOLUTION INTRAMUSCULAR; INTRAVENOUS EVERY 6 HOURS PRN
Status: DISCONTINUED | OUTPATIENT
Start: 2025-05-29 | End: 2025-05-29

## 2025-05-29 RX ORDER — HYDROCODONE BITARTRATE AND ACETAMINOPHEN 5; 325 MG/1; MG/1
2 TABLET ORAL ONCE AS NEEDED
Status: DISCONTINUED | OUTPATIENT
Start: 2025-05-29 | End: 2025-05-29 | Stop reason: HOSPADM

## 2025-05-29 RX ORDER — SODIUM CHLORIDE 9 MG/ML
INJECTION, SOLUTION INTRAVENOUS CONTINUOUS PRN
Status: DISCONTINUED | OUTPATIENT
Start: 2025-05-29 | End: 2025-05-29 | Stop reason: SURG

## 2025-05-29 RX ORDER — ACETAMINOPHEN 10 MG/ML
1000 INJECTION, SOLUTION INTRAVENOUS ONCE
Status: COMPLETED | OUTPATIENT
Start: 2025-05-29 | End: 2025-05-29

## 2025-05-29 RX ADMIN — MIDAZOLAM HYDROCHLORIDE 2 MG: 1 INJECTION INTRAMUSCULAR; INTRAVENOUS at 10:23:00

## 2025-05-29 RX ADMIN — SODIUM CHLORIDE: 9 INJECTION, SOLUTION INTRAVENOUS at 10:35:00

## 2025-05-29 RX ADMIN — ROCURONIUM BROMIDE 50 MG: 10 INJECTION, SOLUTION INTRAVENOUS at 11:03:00

## 2025-05-29 RX ADMIN — SODIUM CHLORIDE, SODIUM LACTATE, POTASSIUM CHLORIDE, CALCIUM CHLORIDE: 600; 310; 30; 20 INJECTION, SOLUTION INTRAVENOUS at 15:04:00

## 2025-05-29 RX ADMIN — SODIUM CHLORIDE, SODIUM LACTATE, POTASSIUM CHLORIDE, CALCIUM CHLORIDE: 600; 310; 30; 20 INJECTION, SOLUTION INTRAVENOUS at 10:24:00

## 2025-05-29 RX ADMIN — DEXAMETHASONE SODIUM PHOSPHATE 8 MG: 4 MG/ML VIAL (ML) INJECTION at 10:57:00

## 2025-05-29 RX ADMIN — LIDOCAINE HYDROCHLORIDE 25 MG: 10 INJECTION, SOLUTION EPIDURAL; INFILTRATION; INTRACAUDAL; PERINEURAL at 10:29:00

## 2025-05-29 RX ADMIN — ONDANSETRON 4 MG: 2 INJECTION INTRAMUSCULAR; INTRAVENOUS at 10:57:00

## 2025-05-29 NOTE — ANESTHESIA PROCEDURE NOTES
Airway  Date/Time: 5/29/2025 10:30 AM  Reason: elective    Airway not difficult    General Information and Staff   Patient location during procedure: OR  Anesthesiologist: Jann Galo MD  Performed: anesthesiologist   Performed by: Jann Galo MD  Authorized by: Jann Galo MD        Indications and Patient Condition  Indications for airway management: anesthesia  Sedation level: deep      Preoxygenated: yesPatient position: sniffing    Mask difficulty assessment: 1 - vent by mask    Final Airway Details    Final airway type: endotracheal airway    Successful airway: ETT  Cuffed: yes   Successful intubation technique: Video laryngoscopy  Facilitating devices/methods: intubating stylet  Endotracheal tube insertion site: oral  Blade: GlideScope  Blade size: #3  ETT size (mm): 7.0    Placement verified by: capnometry   Cuff volume (mL): 6  Measured from: lips  Number of attempts at approach: 1    Additional Comments  Difficulty with DL, grade3 Small mouth opening, limited rom    With glide, single attempt

## 2025-05-29 NOTE — PROGRESS NOTES
NURSING ADMISSION NOTE      Patient admitted via Cart from PACU  Oriented to room.  Safety precautions initiated.  Bed in low position.  Call light in reach.      Patient is drowsy, orientated x4. ASPEN collar when OOB. PCA infusing per orders. PT/OT to see Jeffrey.

## 2025-05-29 NOTE — ANESTHESIA POSTPROCEDURE EVALUATION
[facility]    Юлия Charles Patient Status:  Inpatient   Age/Gender 65 year old female MRN [id number]   Location [facility] SURGERY Attending YOVANA Gutierrez DO   Hosp Day # 0 PCP Patrizia Louise MD       Anesthesia Post-op Note    Right anterior cervical 4-cervical 5, cervical 5-cervical 6, and cervical 6-cervical 7 discectomy and fusion with cage, bone graft, plate/screws,  lumboperitoneal shunt ligation    Procedure Summary       Date: 05/29/25 Room / Location:  MAIN OR 15 / [facility] MAIN OR    Anesthesia Start: 1023 Anesthesia Stop: 1504    Procedures:       Right anterior cervical 4-cervical 5, cervical 5-cervical 6, and cervical 6-cervical 7 discectomy and fusion with cage, bone graft, plate/screws,  lumboperitoneal shunt ligation (Right: Spine Cervical)      INTRAOPERATIVE NEURO MONITORING (Right) Diagnosis:       Cervical myelopathy with cervical radiculopathy (HCC)      (Cervical myelopathy with cervical radiculopathy (HCC) [G95.9, M54.12])    Surgeons: YOVANA Gutierrez DO Anesthesiologist: Jann Galo MD    Anesthesia Type: general ASA Status: 3            Anesthesia Type: general    Vitals Value Taken Time   /68 05/29/25 15:04   Temp 99.7 °F (37.6 °C) 05/29/25 14:54   Pulse 106 05/29/25 15:04   Resp 20 05/29/25 15:04   SpO2 99 % 05/29/25 15:04   Vitals shown include unfiled device data.        Patient Location: PACU    Anesthesia Type: general    Airway Patency: patent    Postop Pain Control: adequate    Mental Status: preanesthetic baseline    Nausea/Vomiting: none    Cardiopulmonary/Hydration status: stable euvolemic    Complications: no apparent anesthesia related complications    Postop vital signs: stable    Dental Exam: Unchanged from Preop    Patient to be discharged from PACU when criteria met.

## 2025-05-29 NOTE — ANESTHESIA PREPROCEDURE EVALUATION
PRE-OP EVALUATION    Patient Name: Юлия Charles    Admit Diagnosis: Cervical myelopathy with cervical radiculopathy (HCC) [G95.9, M54.12]    Pre-op Diagnosis: Cervical myelopathy with cervical radiculopathy (HCC) [G95.9, M54.12]    Right anterior cervical 4-cervical 5, cervical 5-cervical 6, and cervical 6-cervical 7 discectomy and fusion with cage, bone graft, plate/screws,  lumboperitoneal shunt ligation    Anesthesia Procedure: Right anterior cervical 4-cervical 5, cervical 5-cervical 6, and cervical 6-cervical 7 discectomy and fusion with cage, bone graft, plate/screws,  lumboperitoneal shunt ligation (Right: Spine Cervical)  INTRAOPERATIVE NEURO MONITORING (Right)    Surgeons and Role:     * YOVANA Gutierrez, DO - Primary    Pre-op vitals reviewed.  Temp: 97.7 °F (36.5 °C)  Pulse: 77  Resp: 16  BP: 115/72  SpO2: 97 %  Body mass index is 26.19 kg/m².    Current medications reviewed.  Hospital Medications:  Current Medications[1]    Outpatient Medications:   Prescriptions Prior to Admission[2]    Allergies: Amoxicillin and Tamoxifen      Anesthesia Evaluation    Patient summary reviewed.    Anesthetic Complications  (+) history of anesthetic complications  History of: PONV       GI/Hepatic/Renal      (-) GERD                           Cardiovascular      ECG reviewed.  Exercise tolerance: good     MET: >4    (-) obesity  (-) hypertension     (-) CAD                                Endo/Other      (-) diabetes                            Pulmonary        (+) COPD and mild and moderate      (+) shortness of breath            Neuro/Psych    Negative neuro/psych ROS.                                  Past Surgical History[3]  Social Hx on file[4]  History   Drug Use Unknown     Comment: long time ago     Available pre-op labs reviewed.  Lab Results   Component Value Date    WBC 8.8 04/29/2025    RBC 4.96 04/29/2025    HGB 14.5 04/29/2025    HCT 43.7 04/29/2025    MCV 88.1 04/29/2025    MCH 29.2 04/29/2025    [facility]  33.2 2025    RDW 13.5 2025    .0 2025     Lab Results   Component Value Date     2025    K 3.4 (L) 2025     2025    CO2 27.0 2025    BUN 17 2025    CREATSERUM 0.64 2025    GLU 98 2025    CA 9.8 2025            Airway      Mallampati: III  Mouth opening: <3 FB  TM distance: 4 - 6 cm  Neck ROM: limited Cardiovascular    Cardiovascular exam normal.         Dental    Dentition appears grossly intact         Pulmonary    Pulmonary exam normal.                 Other findings              ASA: 3   Plan: general  NPO status verified and patient meets guidelines.          Plan/risks discussed with: patient                Present on Admission:  **None**             [1]    acetaminophen (Tylenol Extra Strength) tab 1,000 mg  1,000 mg Oral Once    lactated ringers infusion   Intravenous Continuous    ceFAZolin (Ancef) 2g in 10mL IV syringe premix  2 g Intravenous Once    ceFAZolin (Ancef) 2 g/10mL IV syringe premix       [2]   Medications Prior to Admission   Medication Sig Dispense Refill Last Dose/Taking    Potassium Chloride ER 10 MEQ Oral Tab CR Take 1 tablet (10 mEq total) by mouth daily. 30 tablet 0 2025 at  7:00 PM    Meloxicam 7.5 MG Oral Tab Take 1 tablet (7.5 mg total) by mouth daily. 30 tablet 1 2025    atorvastatin 10 MG Oral Tab TAKE 1 TABLET(10 MG) BY MOUTH EVERY NIGHT 90 tablet 0 2025 at  7:00 PM    Wheat Dextrin (BENEFIBER OR) Take by mouth twice a week.   Taking    acetaminophen 325 MG Oral Tab Take 1 tablet (325 mg total) by mouth every 6 (six) hours as needed for Pain.   Past Week    ALBUTEROL 108 (90 Base) MCG/ACT Inhalation Aero Soln INHALE 2 PUFFS INTO THE LUNGS EVERY 4 HOURS AS NEEDED FOR WHEEZING OR SHORTNESS OF BREATH 8.5 g 0 More than a month   [3]   Past Surgical History:  Procedure Laterality Date    Brain surgery  2007    removal brain tumor          x2    Colonoscopy      Hysterectomy       Removal of kidney stone      Removal of lung,lobectomy Left     Tonsillectomy      Wom w/ cydney mastec or hx of mastec     [4]   Social History  Socioeconomic History    Marital status:    Tobacco Use    Smoking status: Former     Current packs/day: 0.00     Types: Cigarettes     Quit date: 2018     Years since quittin.7    Smokeless tobacco: Never   Vaping Use    Vaping status: Never Used   Substance and Sexual Activity    Alcohol use: Not Currently    Drug use: Not Currently     Types: Cocaine     Comment: long time ago   Other Topics Concern    Caffeine Concern Yes     Comment: coffee occ, pepsi    Exercise Yes     Comment: walking

## 2025-05-29 NOTE — OPERATIVE REPORT
Date of surgery 5/29/25     Preoperative dx cervical spondylosis leading to cervical myelopathy/radiculopathy       Postoperative dx same     Procedure [please list them in numbered format]   1. Anterior right-sided approach to the cervical spine from C4-7   2. Anterior disc resections at C4-5, C5-6, and C6-7   3 Microfoaminotomies at C4-7 bilaterally   4. Use of microscope for decompression procedure   5. Anterior interbody fusion Seaspine PEEK implant at C4-5, C5-6, and C6-7   6. Use of local autograft/DBM allograft within implant for fusion at C4-5, C5-6, and C6-7   7. Anterior instrumentation from C4-7 with Seaspine plate and screws   8. Intraoperative SSEP monitoring   9. Radiographs for identification of surgical level and for confirmation of appropriate placement of grafts [use of fluoroscopy for duration of instrumentation]  10. LP shunt ligation at abdominal insertion     Surgeon[s] Brenda Darling SA     Anesthesia GETA     IVF 1500 EBL 30 Uout 250 Specimen none     Drains none     Complications none Condition stable to PACU     Indications     This is a 66 YO F with intractable neck pain and UE radiculopathy and weakness, having failed [all nonoperative management]. It was mutually decided that surgical intervention was the best option at this point. All risks and benefits of the surgery were explained to the patient, and he/she wished to proceed with the surgery.  The patient had a LP shunt placed for a pseudomeningocele in the past which is now healed and we discussed ligature of the catheter at the abdominal level as well, risks and benefits discussed at length.      Details of surgery     Patient was placed supine on the OR table, and general anesthesia was induced. The head halter traction device was set up and 10 lbs of weight. The neck was extended within the comfortable range as checked preoperatively. 3-inch wide cloth tape was used to tape down the shoulders. All bony prominences  were well-padded. [A radioopaque skin marker was placed at what appeared to be the cervical disc level of interest, and a lateral radiograph taken.]     A right-sided, approximately 3-inch long transverse  incision was made at approximately the C6 level as denoted by anatomic landmarks [the marker]. A 15-blade scalpel was used to incise the skin and subcutaneous tissues, down through the platysma muscle until the superficial layer of the deep cervical fascia was exposed. This layer was then carefully incised vertically along the medial border of the sternocleiodomastoid muscle using metzenbaum scissors. Then finger dissection was used to bluntly separate the deeper lateral carotid sheath from the medial tracheo-esophageal bundle. Hand-held cloward retractors were used to expose the longus colli muscles anterior to the spine, and Kitners were used to bluntly dissect them away bilaterally, exposing the periosteum and disc spaces. A 18 gauge needle was used to gio the disc space and a lateral radiograph taken to confirm the level.   Once the level was confirmed radiographically, then subperiosteal dissection was performed  over the disc space[s] and vertebral bodies of interest, taking great precaution not to violate the uninvolved disc spaces. Kitners and bipolar cautery were used. Self-retaining retractors were then placed. The microscope was then carefully positioned.   Anterior cervical diskectomy [ies] was then commenced. [All diskectomies were performed identically]. Annulotomy was performed using an 11- blade. Then small pituitary rongeurs and curettes were used to remove the disk material. [A high speed bur was used to remove the endplate down to bleeding bone in preparation for interbody fusion .] [The cartilaginous endplates were completely removed in preparation of implant/ prosthesis placement]. Posterior vertebral osteophytes were removed with small kerrison rongeurs. Bilateral microforaminotomies were  also performed using kerrisons.   The implant size was then estimated using trial implants. Then the appropriate sized implant/ graft was chosen. [The intramedullary space was packed with local autograft and DBM allograft putty] It was then carefully malleted into place [after the malena were constructed by the guided osteotomes as required for the prosthesis]. The neck distraction across the disk space was then released. An anterior cervical plate of appropriate length was used the span the graft[s] [strut graft], and screws were placed in the vertebral bodies above and below for internal fixation. A lateral radiograph was then taken to confirm appropriate placement of graft[s] and instrumentation.   After all implants [grafts] were in place, abundant irrigation of the wound was performed with antibiotic-containing irrigation. All bleeding was controlled with bipolar cautery, thrombin-soaked gelfoam, and floseal. Closure was then commenced. The platysma muscle was closed using interrupted 3-0 [2-0] vicryl. The subcutaneous layer was closed using 4-0 [3-0] interrupted vicryl. The skin was then closed using mastisol and steri-strips. Xeroform and 4x4 gauze was applied and secured with microporous tape. All needle and sponge counts were correct. There were no complications during this surgery.     At this time we incised our abdominal wound overlying the insertion of the LP catheter into the abdomen.  This area had been prepped and draped prior to the cervical component of the surgery.  We then utilized a curve 6 and dissected through the abdominal fascia and muscle and exposed the LP catheter.  After careful dissection of the cathter we then place 3 0-silk ties and ligated the catheter.  Once this was complete, we irrigated the wound and closed the fascia with 2-0 vicryl stitch.  The subdermal layer was closed with 2-0 vicryl stitch and the skin with 4-0 monocryl and dermabond.     A hard cervical collar was placed on  the patients neck. The patient was then revived, extubated, and taken to recovery room in stable condition.

## 2025-05-29 NOTE — BRIEF OP NOTE
Pre-Operative Diagnosis: Cervical myelopathy with cervical radiculopathy (HCC) [G95.9, M54.12]     Post-Operative Diagnosis: Cervical myelopathy with cervical radiculopathy (HCC) [G95.9, M54.12]      Procedure Performed:   Right anterior cervical 4-cervical 5, cervical 5-cervical 6, and cervical 6-cervical 7 discectomy and fusion with cage, bone graft, plate/screws,  lumboperitoneal shunt ligation    Surgeons and Role:     * YOVANA Gutierrez DO - Primary    Assistant(s):  Surgical Assistant.: John Darling, ANGEL     Surgical Findings: severe DDD with osteophyte formation and stenosis     Specimen: none     Estimated Blood Loss: Blood Output: 35 mL (5/29/2025  2:02 PM)      Dictation Number:  to follow    Josey Gutierrez DO  5/29/2025  2:24 PM

## 2025-05-30 ENCOUNTER — APPOINTMENT (OUTPATIENT)
Dept: CT IMAGING | Facility: HOSPITAL | Age: 65
DRG: 473 | End: 2025-05-30
Attending: PHYSICIAN ASSISTANT
Payer: MEDICARE

## 2025-05-30 ENCOUNTER — APPOINTMENT (OUTPATIENT)
Dept: GENERAL RADIOLOGY | Facility: HOSPITAL | Age: 65
DRG: 473 | End: 2025-05-30
Attending: NEUROLOGICAL SURGERY
Payer: MEDICARE

## 2025-05-30 ENCOUNTER — APPOINTMENT (OUTPATIENT)
Dept: CT IMAGING | Facility: HOSPITAL | Age: 65
End: 2025-05-30
Attending: PHYSICIAN ASSISTANT
Payer: MEDICARE

## 2025-05-30 ENCOUNTER — APPOINTMENT (OUTPATIENT)
Dept: GENERAL RADIOLOGY | Facility: HOSPITAL | Age: 65
End: 2025-05-30
Attending: NEUROLOGICAL SURGERY
Payer: MEDICARE

## 2025-05-30 VITALS
TEMPERATURE: 99 F | BODY MASS INDEX: 26.35 KG/M2 | HEART RATE: 105 BPM | SYSTOLIC BLOOD PRESSURE: 117 MMHG | HEIGHT: 62 IN | OXYGEN SATURATION: 93 % | DIASTOLIC BLOOD PRESSURE: 65 MMHG | RESPIRATION RATE: 20 BRPM | WEIGHT: 143.19 LBS

## 2025-05-30 LAB
ANION GAP SERPL CALC-SCNC: 11 MMOL/L (ref 0–18)
BUN BLD-MCNC: 14 MG/DL (ref 9–23)
CALCIUM BLD-MCNC: 8.6 MG/DL (ref 8.7–10.6)
CHLORIDE SERPL-SCNC: 102 MMOL/L (ref 98–112)
CO2 SERPL-SCNC: 25 MMOL/L (ref 21–32)
CREAT BLD-MCNC: 0.6 MG/DL (ref 0.55–1.02)
EGFRCR SERPLBLD CKD-EPI 2021: 99 ML/MIN/1.73M2 (ref 60–?)
ERYTHROCYTE [DISTWIDTH] IN BLOOD BY AUTOMATED COUNT: 13.2 %
GLUCOSE BLD-MCNC: 151 MG/DL (ref 70–99)
HCT VFR BLD AUTO: 36.5 % (ref 35–48)
HGB BLD-MCNC: 12.3 G/DL (ref 12–16)
MCH RBC QN AUTO: 29.5 PG (ref 26–34)
MCHC RBC AUTO-ENTMCNC: 33.7 G/DL (ref 31–37)
MCV RBC AUTO: 87.5 FL (ref 80–100)
OSMOLALITY SERPL CALC.SUM OF ELEC: 289 MOSM/KG (ref 275–295)
PLATELET # BLD AUTO: 290 10(3)UL (ref 150–450)
POTASSIUM SERPL-SCNC: 3.8 MMOL/L (ref 3.5–5.1)
RBC # BLD AUTO: 4.17 X10(6)UL (ref 3.8–5.3)
SODIUM SERPL-SCNC: 138 MMOL/L (ref 136–145)
WBC # BLD AUTO: 24.2 X10(3) UL (ref 4–11)

## 2025-05-30 PROCEDURE — 99232 SBSQ HOSP IP/OBS MODERATE 35: CPT | Performed by: HOSPITALIST

## 2025-05-30 PROCEDURE — 72040 X-RAY EXAM NECK SPINE 2-3 VW: CPT | Performed by: NEUROLOGICAL SURGERY

## 2025-05-30 PROCEDURE — 70450 CT HEAD/BRAIN W/O DYE: CPT | Performed by: PHYSICIAN ASSISTANT

## 2025-05-30 RX ORDER — ECHINACEA PURPUREA EXTRACT 125 MG
1 TABLET ORAL
Status: DISCONTINUED | OUTPATIENT
Start: 2025-05-30 | End: 2025-05-30

## 2025-05-30 RX ORDER — DIAZEPAM 5 MG/1
5 TABLET ORAL NIGHTLY PRN
Qty: 5 TABLET | Refills: 0 | Status: SHIPPED | OUTPATIENT
Start: 2025-05-30 | End: 2025-06-04

## 2025-05-30 RX ORDER — HYDROCODONE BITARTRATE AND ACETAMINOPHEN 5; 325 MG/1; MG/1
1 TABLET ORAL EVERY 4 HOURS PRN
Refills: 0 | Status: DISCONTINUED | OUTPATIENT
Start: 2025-05-30 | End: 2025-05-30

## 2025-05-30 RX ORDER — BUTALBITAL, ACETAMINOPHEN AND CAFFEINE 50; 325; 40 MG/1; MG/1; MG/1
1 TABLET ORAL EVERY 4 HOURS PRN
Qty: 20 TABLET | Refills: 0 | Status: SHIPPED | OUTPATIENT
Start: 2025-05-30

## 2025-05-30 RX ORDER — HYDROCODONE BITARTRATE AND ACETAMINOPHEN 5; 325 MG/1; MG/1
1 TABLET ORAL EVERY 4 HOURS PRN
Qty: 30 TABLET | Refills: 0 | Status: SHIPPED | OUTPATIENT
Start: 2025-05-30 | End: 2025-06-04

## 2025-05-30 RX ORDER — METHOCARBAMOL 500 MG/1
500 TABLET, FILM COATED ORAL 3 TIMES DAILY PRN
Qty: 30 TABLET | Refills: 0 | Status: SHIPPED | OUTPATIENT
Start: 2025-05-30 | End: 2025-06-04

## 2025-05-30 RX ORDER — HYDROCODONE BITARTRATE AND ACETAMINOPHEN 5; 325 MG/1; MG/1
2 TABLET ORAL EVERY 4 HOURS PRN
Refills: 0 | Status: DISCONTINUED | OUTPATIENT
Start: 2025-05-30 | End: 2025-05-30

## 2025-05-30 RX ORDER — ONDANSETRON 2 MG/ML
4 INJECTION INTRAMUSCULAR; INTRAVENOUS EVERY 6 HOURS PRN
Status: DISCONTINUED | OUTPATIENT
Start: 2025-05-30 | End: 2025-05-30

## 2025-05-30 RX ORDER — BUTALBITAL, ACETAMINOPHEN AND CAFFEINE 50; 325; 40 MG/1; MG/1; MG/1
1 TABLET ORAL EVERY 4 HOURS PRN
Status: DISCONTINUED | OUTPATIENT
Start: 2025-05-30 | End: 2025-05-30

## 2025-05-30 NOTE — PLAN OF CARE
POD#0 ACDF + shunt ligation. AxO4. VSS on RA. Denies nausea during shift. Murphy patent intact. PCA dilaudid - neck pain controlled, used sparingly. C/o headache - given sched tylenol, IV toradol. Denies N/T, pulses palpable, strong . Dressing to anterior neck intact - telfa, tegaderm. PT/OT to see. Updated on POC. Safety measures placed. Care ongoing.

## 2025-05-30 NOTE — PROGRESS NOTES
Patient discharging home. Discharge education completed at bedside, all questions answered. IV removed.

## 2025-05-30 NOTE — PROGRESS NOTES
[facility]  Neurosurgery Progress Note    Юлия Charles Patient Status:  Inpatient    1960 MRN [id number]   Location [facility] 3SW-A Attending YOVANA Gutierrez, DO   Hosp Day # 1 PCP Patrizia Louise MD     Chief Complaint:  POD#1 C4-7 ACDF, LP shunt ligation    Subjective:  Pt examined, reports from a cervical standpoint she has minimal pain, no pain radiating down her arms and no paresthesias. She has a severe frontal headache this morning and sinus pressure. She notes she has had this in the past and often has to take OTC sinus pressure medications to help with this.     Objective/Physical Exam:    Vital Signs:  Blood pressure 137/67, pulse 92, temperature 97.9 °F (36.6 °C), temperature source Oral, resp. rate 18, height 5' 2\" (1.575 m), weight 143 lb 3.2 oz (65 kg), SpO2 95%.    Respiratory:  Respirations nonlabored    CV:  NSR on tele    General: NAD, Speech Fluent, Mood Appropriate in mild discomfort    Neurologic: This patient is alert and orientated x 3.  Able to follow commands.  Face is symmetric. PERRLA +3 brisk, EOMI.  CN 2-12 GI,  Sensation to light touch is intact bilaterally.  Finger-to-nose coordination is intact.  No Pronator Drift.  Neg Wolff's.  No clonus. Strength 5/5 in all extremities.  DTRs 2+ UE and LE.      Skin: Anterior cervical dressings intact and dry      Labs:  Lab Results   Component Value Date    WBC 24.2 2025    HGB 12.3 2025    HCT 36.5 2025    .0 2025    CREATSERUM 0.60 2025    BUN 14 2025     2025    K 3.8 2025     2025    CO2 25.0 2025     2025    CA 8.6 2025       Imaging:  XR FLUOROSCOPY C-ARM TIME LESS THAN 1 HOUR (CPT=76000)  Result Date: 2025  CONCLUSION:  Fluoroscopic guidance for cervical fusion from C4-5 to C6-7.   LOCATION:  [facility]    Dictated by (CST): Rasta Reyes MD on 2025 at 2:39 PM     Finalized by (CST): Rasta Reyes MD on 2025  at 2:39 PM          Assessment:  66 YO F with PMH ?CSF leak with history of LP shunt presenting intractable neck pain and UE radiculopathy and weakness found to have cervical stenosis at C4-7 worst at C5-6 now s/p C4-7ACDF and LP shunt ligation 5/29     Plan:  -Optimization per Hospitalist  -Pain control as ordered- transitioned off PCA to orals  -CT head wo this morning given severe HA and recent shunt ligation  -PT/OT/OOB as tolerated  -Encourage oral fluids and caffeine  -SCDs for DVT ppx    Discussed with Kerry Gutierrez and Derek Cid PA-C  [facility]  5/30/2025 8:10 AM       Is this a shared or split note between Advanced Practice Provider and Physician? Yes

## 2025-05-30 NOTE — CONSULTS
[facility]PITAL[facility]  CONSULT     Юлия Charles Patient Status:  Inpatient    1960 MRN [id number]   Location [facility] 3SW-A Attending YOVANA Gutierrez, DO   Hosp Day # 0 PCP Patrizia Louise MD     Reason for consult: medical management    Requested by: Neurosurgery    Subjective:   History of Present Illness:     Юлия Charles is a 65 year old female with a past medical history of COPD, hyperlipidemia.  She has had cervical myelopathy with cervical radiculopathy and has not been admitted for ACDF.  Postoperatively the patient has some lethargy/somnolence though is improving.  Hospital service has been consulted to help with medical management.    History/Other:    Past Medical History:  Past Medical History[1]  Past Surgical History:   Past Surgical History[2]   Family History:   Family History[3]  Social History:    reports that Юлия quit smoking about 6 years ago. Юлия's smoking use included cigarettes. Юлия has never used smokeless tobacco. Юлия reports that Юлия does not currently use alcohol. Юлия reports that Юлия does not currently use drugs after having used the following drugs: Cocaine.     Allergies: Allergies[4]    Medications:  Medications Ordered Prior to Encounter[5]    Review of Systems:   A comprehensive review of systems was completed.    Pertinent positives and negatives noted in the HPI.    Objective:   Physical Exam:    /72 (BP Location: Right arm)   Pulse 99   Temp 98.6 °F (37 °C) (Oral)   Resp 20   Ht 5' 2\" (1.575 m)   Wt 143 lb 3.2 oz (65 kg)   SpO2 100%   BMI 26.19 kg/m²   General: No acute distress, Alert  Respiratory: No rhonchi, no wheezes  Cardiovascular: S1, S2. Regular rate and rhythm  Abdomen: Soft, Non-tender, Non-distended, Positive bowel sounds  Neuro: No new focal deficits  Extremities: No edema  Integ: ant. Neck Insidon with bandages overlying    Results:    Labs:      Labs Last 24 Hours:  No results for  input(s): \"WBC\", \"HGB\", \"MCV\", \"PLT\", \"BAND\", \"INR\" in the last 168 hours.    Invalid input(s): \"LYM#\", \"MONO#\", \"BASOS#\", \"EOSIN#\"    No results for input(s): \"GLU\", \"BUN\", \"CREATSERUM\", \"GFRAA\", \"GFRNAA\", \"CA\", \"ALB\", \"NA\", \"K\", \"CL\", \"CO2\", \"ALKPHO\", \"AST\", \"ALT\", \"BILT\", \"TP\" in the last 168 hours.    No results for input(s): \"PTP\", \"INR\" in the last 168 hours.    No results for input(s): \"TROP\", \"CK\" in the last 168 hours.      Imaging: Imaging data reviewed in Epic.    Assessment & Plan:      # Cervical myelopathy with cervical radiculopathy status post ACDF 5/29  Pain control, on PCA    #HLD  Statin    #Asthma  PRN nebs      All diagnosis' and recommendations discussed with patient and/or family in detail.      Luan Topete MD  5/29/2025        The 21st Century Cures Act makes medical notes like these available to patients in the interest of transparency. Please be advised this is a medical document. Medical documents are intended to carry relevant information, facts as evident, and the clinical opinion of the practitioner. The medical note is intended as peer to peer communication and may appear blunt or direct. It is written in medical language and may contain abbreviations or verbiage that are unfamiliar.                    [1]   Past Medical History:   Arthritis    Back pain    Bloating    Blood in urine    Breast cancer, right breast (HCC)    Formatting of this note might be different from the original.  S/p b/l mastectomy  In 2008. On Aromacin      Calculus of kidney    Change in hair    Constipation    COPD (chronic obstructive pulmonary disease) (HCC)    No oxygen    Diarrhea, unspecified    Eye disease    Fatigue    Frequent urination    High cholesterol    History of blood transfusion    about 20 years ago    Intracranial tumor (HCC)    Irregular bowel habits    Pain in joints    Personal history of antineoplastic chemotherapy    Last treatment 2014    PONV (postoperative nausea and vomiting)     Stool incontinence    Stress    Visual impairment    glasses    Wears glasses   [2]   Past Surgical History:  Procedure Laterality Date    Brain surgery      removal brain tumor          x2    Colonoscopy      Hysterectomy      Removal of kidney stone      Removal of lung,lobectomy Left     Tonsillectomy      Wom w/ cydney mastec or hx of mastec     [3]   Family History  Problem Relation Age of Onset    Heart Disease Father     Colon Polyps Mother     Substance Abuse Son     Diabetes Sister     Heart Disease Sister    [4]   Allergies  Allergen Reactions    Amoxicillin NAUSEA ONLY     \"At least 20 years ago\"    Tamoxifen OTHER (SEE COMMENTS)     Very emotional     [5]   No current facility-administered medications on file prior to encounter.     Current Outpatient Medications on File Prior to Encounter   Medication Sig Dispense Refill    Meloxicam 7.5 MG Oral Tab Take 1 tablet (7.5 mg total) by mouth daily. 30 tablet 1    atorvastatin 10 MG Oral Tab TAKE 1 TABLET(10 MG) BY MOUTH EVERY NIGHT 90 tablet 0    Wheat Dextrin (BENEFIBER OR) Take by mouth twice a week.      acetaminophen 325 MG Oral Tab Take 1 tablet (325 mg total) by mouth every 6 (six) hours as needed for Pain.

## 2025-05-30 NOTE — DISCHARGE SUMMARY
[facility]  Discharge Summary    Юлия Charles Patient Status:  Inpatient    1960 MRN [id number]   Location [facility] 3SW-A Attending YOVANA Gutierrez, DO   Hosp Day # 1 PCP Patrizia Louise MD     Date of Admission: 2025    Date of Discharge: 25    Admitting Diagnosis: Cervical myelopathy with cervical radiculopathy (HCC) [G95.9, M54.12]    Discharge Diagnosis: Problem List[1] Cervical myelopathy with cervical radiculopathy (HCC) [G95.9, M54.12]    Reason for Admission: Cervical radiculopathy    Procedures: Right anterior cervical 4-cervical 5, cervical 5-cervical 6, and cervical 6-cervical 7 discectomy and fusion with cage, bone graft, plate/screws,  lumboperitoneal shunt ligation     Hospital Course: 66 YO F with PMH intracranial hypotension with history of LP shunt presenting intractable neck pain and UE radiculopathy and weakness found to have cervical stenosis at C4-7 worst at C5-6 now s/p C4-7ACDF and LP shunt ligation  . She was admitted to [facility] post-op. On POD#1 she reported severe frontal HA for which a head CT was obtained with no acute findings. Her symptoms resolved after caffeine use. Her post-op pain was well controlled, she was able to tolerate a regular diet, void and ambulate without difficulty. She was deemed stable for discharge home on 25.    Complications: None    Discharge Condition: Fair    Disposition: Home or Self Care    Discharge Medications:   Current Discharge Medication List        START taking these medications    Details   diazePAM 5 MG Oral Tab Take 1 tablet (5 mg total) by mouth nightly as needed (to be given if spasms not relieved by methocarbamol).  Qty: 5 tablet, Refills: 0    Associated Diagnoses: S/P cervical spinal fusion      HYDROcodone-acetaminophen 5-325 MG Oral Tab Take 1 tablet by mouth every 4 (four) hours as needed.  Qty: 30 tablet, Refills: 0    Associated Diagnoses: S/P cervical spinal fusion      methocarbamol 500 MG Oral  Tab Take 1 tablet (500 mg total) by mouth 3 (three) times daily as needed.  Qty: 30 tablet, Refills: 0    Associated Diagnoses: S/P cervical spinal fusion      butalbital-acetaminophen-caffeine -40 MG Oral Tab Take 1 tablet by mouth every 4 (four) hours as needed for Headaches.  Qty: 20 tablet, Refills: 0    Associated Diagnoses: S/P cervical spinal fusion           CONTINUE these medications which have NOT CHANGED    Details   Potassium Chloride ER 10 MEQ Oral Tab CR Take 1 tablet (10 mEq total) by mouth daily.  Qty: 30 tablet, Refills: 0    Associated Diagnoses: Hypokalemia      atorvastatin 10 MG Oral Tab TAKE 1 TABLET(10 MG) BY MOUTH EVERY NIGHT  Qty: 90 tablet, Refills: 0      Wheat Dextrin (BENEFIBER OR) Take by mouth twice a week.      acetaminophen 325 MG Oral Tab Take 1 tablet (325 mg total) by mouth every 6 (six) hours as needed for Pain.      ALBUTEROL 108 (90 Base) MCG/ACT Inhalation Aero Soln INHALE 2 PUFFS INTO THE LUNGS EVERY 4 HOURS AS NEEDED FOR WHEEZING OR SHORTNESS OF BREATH  Qty: 8.5 g, Refills: 0    Associated Diagnoses: Chronic obstructive pulmonary disease, unspecified COPD type (HCC)           STOP taking these medications       Meloxicam 7.5 MG Oral Tab              Follow up Visits:   Future Appointments   Date Time Provider Department Center   6/4/2025  1:30 PM Alma Delia Patterson APRN [facility]   6/25/2025 11:20 AM YOVANA Gutierrez DO [facility]   7/29/2025 10:00 AM Oliver Pierre MD [facility]   10/8/2025  2:30 PM Joselo Dunn MD [id number]         Patient Instructions:  Activity: activity as tolerated and no driving while on analgesics  Wound Care: keep wound clean and dry    Payal Cid PA-C  [facility]  5/30/2025 2:20 PM         [1]   Patient Active Problem List  Diagnosis    Diverticulosis of large intestine without hemorrhage    Chronic obstructive pulmonary disease (HCC)    Mixed hyperlipidemia    Acute  respiratory failure with hypoxemia (HCC)    Pneumonia due to COVID-19 virus    Pneumonia due to suspected gram-negative bacteria    Sepsis (HCC)    Change in bowel habits    Diarrhea unspecified    Special screening for malignant neoplasm of colon    Benign neoplasm of cecum    S/P cervical spinal fusion    Cervical myelopathy with cervical radiculopathy (HCC)

## 2025-05-30 NOTE — PHYSICAL THERAPY NOTE
PHYSICAL THERAPY EVALUATION - INPATIENT     Room Number: 378/378-A  Evaluation Date: 5/30/2025  Type of Evaluation: Initial  Physician Order: PT Eval and Treat    Presenting Problem: cervical spondylosis, cervical myelopathy/radiculopathy, s/p ACDF C4-7 5/29  Co-Morbidities : COPD, cerebellar bass s/p removal, LP shunt  Reason for Therapy: Mobility Dysfunction and Discharge Planning    PHYSICAL THERAPY ASSESSMENT   Patient is a 65 year old female admitted 5/29/2025 for Cervical spondylosis, cervical myelopathy/radiculopathy, ACDF C4-7 5/29.   Patient is currently functioning near baseline with bed mobility, transfers, gait, and stair negotiation. Prior to admission, patient's baseline is independent.     Patient will benefit from continued skilled PT Services at discharge to promote prior level of function and safety with additional support and return home with home health PT.    PLAN  Patient has been evaluated and presents with no skilled Physical Therapy needs at this time.  Patient discharged from Physical Therapy services.  Please re-order if a new functional limitation presents during this admission.         GOALS  Patient was able to achieve the following goals ...    Patient was able to transfer Safely and independently   Patient able to ambulate on level surfaces Safely and with intermittent supervision      HOME SITUATION  Type of Home: House  Home Layout: Two level  Stairs to Enter : 2   Railing: Yes    Stairs to Bedroom: 0         Lives With: Spouse, Family    Drives: Yes   Patient Regularly Uses: Glasses     Prior Level of [place]: Pt typically indep with ADLs and mobility. Has a RW available for use. Lives with sps, dtr,  and 4yo granddtr.     SUBJECTIVE  \"My granddaughter is 5\"     OBJECTIVE  Precautions: Spine (Aspen on when OOB)  Fall Risk: Standard fall risk    WEIGHT BEARING RESTRICTION     PAIN ASSESSMENT  Rating: Unable to rate  Location: neck  Management Techniques: Body  mechanics    COGNITION  Overall Cognitive Status:  WFL - within functional limits    RANGE OF MOTION AND STRENGTH ASSESSMENT  See OT note for UE assessment    Lower extremity ROM is within functional limits      Lower extremity strength is within functional limits     BALANCE  Static Sitting: Fair +  Dynamic Sitting: Fair +  Static Standing: Fair -  Dynamic Standing: Poor +    ADDITIONAL TESTS                                    ACTIVITY TOLERANCE                         O2 WALK       NEUROLOGICAL FINDINGS                        AM-PAC '6-Clicks' INPATIENT SHORT FORM - BASIC MOBILITY  How much difficulty does the patient currently have...  Patient Difficulty: Turning over in bed (including adjusting bedclothes, sheets and blankets)?: None   Patient Difficulty: Sitting down on and standing up from a chair with arms (e.g., wheelchair, bedside commode, etc.): None   Patient Difficulty: Moving from lying on back to sitting on the side of the bed?: None   How much help from another person does the patient currently need...   Help from Another: Moving to and from a bed to a chair (including a wheelchair)?: None   Help from Another: Need to walk in hospital room?: A Little   Help from Another: Climbing 3-5 steps with a railing?: A Little       AM-PAC Score:  Raw Score: 22   Approx Degree of Impairment: 20.91%   Standardized Score (AM-PAC Scale): 53.28   CMS Modifier (G-Code): CJ    FUNCTIONAL ABILITY STATUS  Gait Assessment   Functional Mobility/Gait Assessment  Gait Assistance: Supervision  Distance (ft): 200  Assistive Device: Rolling walker  Pattern: Within Functional Limits (guarded)    Skilled Therapy Provided     Bed Mobility:  Rolling: NT  Supine to sit: ind   Sit to supine: NT     Transfer Mobility:  Sit to stand: ind   Stand to sit: ind  Gait = supervision    Therapist's comments:RN cleared for session. Pt agreeable for therapy, received supine. Pt educated on spine precautions. Trained on log roll technique for  supine<>sit for spine protection and pain mgmt. Trained on proper STS mechanics with safe placement of UE/LE for optimal force generation. Trained on sequencing of gait with RW for optimal gait mechanics. Trained on transfer in/out of car with cues for adherence to spinal precautions. Trained on stair steps with railing. Instructed to call for nursing staff for any needs and OOB mobility.     Exercise/Education Provided:  Bed mobility  Body mechanics  Energy conservation  Functional activity tolerated  Gait training  Posture  Strengthening  Transfer training    Patient End of Session: Up in chair, Needs met, Call light within reach, RN aware of session/findings, All patient questions and concerns addressed, Hospital anti-slip socks, Alarm set, Discussed recommendations with /    Patient Evaluation Complexity Level:  History High - 3 or more personal factors and/or co-morbidities   Examination of body systems Low -  addressing 1-2 elements   Clinical Presentation Low- Stable   Clinical Decision Making Low Complexity       PT Session Time: 20 minutes  Gait Training: 10 minutes  Therapeutic Activity: 5 minutes

## 2025-05-30 NOTE — PROGRESS NOTES
[facility]   part of [facility]     Hospitalist Progress Note     Юлия Charles Patient Status:  Inpatient    1960 MRN [id number]   Location [facility] 3SW-A Attending YOVANA Gutierrez, DO   Hosp Day # 1 PCP Patrizia Louise MD     Chief Complaint: sp ACDF    Subjective:     Patient had HA this AM, resolved. Thinks it was caffeine w/d    Objective:    Review of Systems:   A comprehensive review of systems was completed; pertinent positive and negatives stated in subjective.    Vital signs:  Temp:  [97.8 °F (36.6 °C)-98.6 °F (37 °C)] 98.6 °F (37 °C)  Pulse:  [] 105  Resp:  [16-20] 20  BP: (117-140)/(65-78) 117/65  SpO2:  [93 %-100 %] 93 %    Physical Exam:    General: No acute distress  Respiratory: No wheezes, no rhonchi  Cardiovascular: S1, S2, regular rate and rhythm  Abdomen: Soft, Non-tender, non-distended, positive bowel sounds  Neuro: No new focal deficits.   Extremities: No edema      Diagnostic Data:    Labs:  Recent Labs   Lab 25  0507   WBC 24.2*   HGB 12.3   MCV 87.5   .0       Recent Labs   Lab 25  0507   *   BUN 14   CREATSERUM 0.60   CA 8.6*      K 3.8      CO2 25.0       Estimated Glomerular Filtration Rate: 99 mL/min/1.73m2 (result from lab).    No results for input(s): \"TROP\", \"TROPHS\", \"CK\" in the last 168 hours.    No results for input(s): \"PTP\", \"INR\" in the last 168 hours.               Microbiology    No results found for this visit on 25.      Imaging: Reviewed in Epic.    Medications:    sennosides  17.2 mg Oral Nightly    docusate sodium  100 mg Oral BID    atorvastatin  10 mg Oral Nightly    hydrocortisone   Topical BID       Assessment & Plan:      # Cervical myelopathy with cervical radiculopathy status post ACDF   Pain controlled off PCA    #HA  -pt suspects caffeine w/d. Resolved  -CTH unremarkable     #HLD  Statin     #Asthma  PRN nebs    Ok to DC from my standpoint      Hai Haney MD    Supplementary  Documentation:     Quality:  DVT Mechanical Prophylaxis: EMIGDIO hose, SCDs,    DVT Pharmacologic Prophylaxis   Medication   None                Code Status: Not on file  Murphy: No urinary catheter in place  Murphy Duration (in days):   Central line:    JANAE: 5/30/2025    Discharge is dependent on: course  At this point   Melvin is expected to be discharge to: home    The 21st Century Cures Act makes medical notes like these available to patients in the interest of transparency. Please be advised this is a medical document. Medical documents are intended to carry relevant information, facts as evident, and the clinical opinion of the practitioner. The medical note is intended as peer to peer communication and may appear blunt or direct. It is written in medical language and may contain abbreviations or verbiage that are unfamiliar.

## 2025-05-30 NOTE — DISCHARGE INSTRUCTIONS
Post-operative Instructions  Anterior Cervical Fusion    Refer to this sheet in the next few weeks. These instructions provide you with information on caring for yourself after your procedure. Your treatment has been planned according to current medical practices, but problems sometimes occur. Call your health care provider if you have any problems or questions after your procedure: [phone].          -This surgery is done to relieve pressure on your nerves or spinal cord in your neck.   -The procedure lasts 1-2 hours.  -You will spend one night in the hospital after surgery, and go home the next day.     WHAT TO EXPECT AFTER THE PROCEDURE  After your procedure, it is typical to have the following symptoms:    Pain in the back of your neck   Numbness   Weakness     -Following surgery, these post-operative symptoms will improve with time.    HOME CARE INSTRUCTIONS  It can take 6-12 months to recover fully from this procedure. Make sure to follow all of your health care provider's instructions carefully.    Only take medicines as directed by your health care provider.  You will receive narcotic medication for pain, try to use this only as needed and begin to cut back as your pain improves.   You will receive muscle relaxant medication (Robaxin (methocarbamol) or Tizanidine (zanaflex)); Take this medication scheduled three times per day.   You will also receive two stool softeners to take as needed for constipation.   You can eat what you usually do. Occasionally patients experience difficulty swallowing after this procedure. If you experience this, try soft foods and smoothies for a few days.   Your activities will be limited for the first 3-4 weeks. In general:   ? It is fine to walk and climb stairs as much as tolerated.  ? Do not lift anything weighing more than 10 lb (4.5 kg).  ? Do not lift objects over your head.  ? Do not drive until your health care provider says it is okay, at least 7-10 days.     Take  showers instead of baths until directed by your health care provider. Ok to let water run over incision but do not scrub over incision or apply ointments or creams over incision.   You do not require a bandage, the incision will be covered with dermabond (skin glue).   The sutures are beneath the skin and dissolvable. You will not have to have them removed.   You may apply ice to the repaired area:    ? Put ice in a plastic bag.  ? Place a towel between your skin and the bag.  ? Leave the ice on for 20 minutes, 2-3 times a day.     You will return for follow-up appointment approximately two weeks post-op.    SEEK MEDICAL CARE IF:   You have redness, swelling, or pain in your cut (incision) that is getting worse.   You have pus coming from the incision.   You have a fever.   There is a bad smell coming from the incision or bandage.   The edges of the incision break open after sutures or staples are taken out.   Your pain medicine is not helping.   You have swelling in your calf or leg.   You seem to be getting worse instead of better.     You develop shortness of breath or chest pain.   You have trouble swallowing.   You have pain, numbness, or weakness that is getting worse.    This information is not intended to replace advice given to you by your health care provider. Make sure you discuss any questions you have with your health care provider.    Document Released: 12/18/2006 Document Revised: 10/23/2017 Document Reviewed: 10/23/2017  [employer] Patient Education © 2018 [employer]

## 2025-05-30 NOTE — PHYSICAL THERAPY NOTE
Order received, chart reviewed. Pt awaiting aspen collar, also with stat CT ordered. Will hold and follow as appropriate.     Cici Siu, PT, DPT  05/30/25     RW/needs device and assist

## 2025-05-30 NOTE — PLAN OF CARE
Patient is A&Ox4, VSS on room air. Patients headache has improved since this morning. Tolerating diet now, up in chair, ambulating to bathroom/halls. [place]or[place] collar on when out of bed. Dressing to anterior neck C/D/I, left lower abdomen dressing C/D/I. Most likely to DC home today.

## 2025-05-30 NOTE — OCCUPATIONAL THERAPY NOTE
OCCUPATIONAL THERAPY EVALUATION - INPATIENT    Room Number: 378/378-A  Evaluation Date: 5/30/2025     Type of Evaluation: Initial  Presenting Problem: s/p R ACDF C4-C7    Physician Order: IP Consult to Occupational Therapy  Reason for Therapy:  ADL/IADL Dysfunction and Discharge Planning      OCCUPATIONAL THERAPY ASSESSMENT   Patient met all OT goals at supervision level.    Patient reports no further questions/concerns at this time.   Discharge OT in hospital. Recommend family education on spine precautions and family providing cueing prn to maintain.          History: Patient is a 65 year old female admitted on 5/29/2025 with Presenting Problem: s/p R ACDF C4-C7. Co-Morbidities : COPD, cerebellar bass s/p removal, LP shunt    WEIGHT BEARING RESTRICTION                   Recommendations for nursing staff:   Transfers: supervision  Toileting location: Toilet    EVALUATION SESSION:      EDUCATION PROVIDED  Patient Education : Role of Occupational Therapy; Plan of Care; Discharge Recommendations; Functional Transfer Techniques; Bracing Education Provided  Patient's Response to Education: Verbalized Understanding    PATIENT START OF SESSION: seated  FUNCTIONAL TRANSFER ASSESSMENT  Sit to Stand: Edge of Bed  Edge of Bed: Supervision  Toilet Transfer: Supervision    BED MOBILITY  Rolling: Supervision  Supine to Sit : Supervision  Sit to Supine (OT): Supervision    BALANCE ASSESSMENT     FUNCTIONAL ADL ASSESSMENT  Grooming Standing: Supervision  UB Dressing Seated: Supervision  LB Dressing Seated: Supervision (figure 4)  Toileting Seated: Supervision      EQUIPMENT USED: none  Demonstrates functional use    THERAPIST COMMENTS: ADL/mobility as noted. Sit > stand > toilet > toileting > grooming > don/doff brace > chair > UB/LB dressing > stand > toilet > toileting > hand hygiene > log roll > chair. Supervision throughout, frequent cueing to maintain precautions especially when brace removed to practice donning/doffing.      Patient educated on donning and doffing brace, able to demonstrate with supervision. Patient also educated on caring for brace and importance of maintaining skin integrity with good verbal understanding.      Patient End of Session: Up in chair, Needs met, Call light within reach, RN aware of session/findings, All patient questions and concerns addressed, Hospital anti-slip socks, Alarm set    OCCUPATIONAL PROFILE    HOME SITUATION  Type of Home: House  Home Layout: Two level  Lives With: Spouse, Family    Toilet and Equipment: Standard height toilet  Shower/Tub and Equipment: Tub-shower combo, Grab bar       Occupation/Status: retired     Drives: Yes  Patient Regularly Uses: Glasses    Prior Level of Function: Pt reports independence with ADL and ambulation without AD prior to admit.      SUBJECTIVE  Pt states her family will yell at her if she breaks her precautions.     PAIN ASSESSMENT  Rating: Unable to rate  Location: mild headache  Management Techniques: Relaxation    OBJECTIVE  Precautions: Spine (Aspen on when OOB)  Fall Risk: Standard fall risk    WEIGHT BEARING RESTRICTION                   AM-PAC ‘6-Clicks’ Inpatient Daily Activity Short Form  -   Putting on and taking off regular lower body clothing?: A Little  -   Bathing (including washing, rinsing, drying)?: A Little  -   Toileting, which includes using toilet, bedpan or urinal? : A Little  -   Putting on and taking off regular upper body clothing?: A Little  -   Taking care of personal grooming such as brushing teeth?: A Little  -   Eating meals?: A Little    AM-PAC Score:  Score: 18  Approx Degree of Impairment: 46.65%  Standardized Score (AM-PAC Scale): 38.66      ADDITIONAL TESTS     NEUROLOGICAL FINDINGS        PLAN   Patient has been evaluated and presents with no skilled Occupational Therapy needs at this time.  Patient discharged from Occupational Therapy services.  Please re-order if a new functional limitation presents during this  admission.      Patient Evaluation Complexity Level:   Occupational Profile/Medical History LOW   Specific performance deficits impacting engagement in ADL/IADL LOW   Client Assessment/Performance Deficits LOW   Clinical Decision Making LOW   Overall Complexity LOW     OT Session Time: 30 minutes  Self-Care Home Management: 15 minutes  Therapeutic Activity:  minutes  Neuromuscular Re-education:  minutes  Therapeutic Exercise:  minutes

## 2025-05-30 NOTE — PROGRESS NOTES
07:47: Payal MILLS aware of patient having consistent headaches.      08:10: STAT CT Head ordered    10:00: Patient back from CT, reporting severe nausea, paged MD for another antiemetic.

## 2025-06-02 ENCOUNTER — TELEPHONE (OUTPATIENT)
Dept: SURGERY | Facility: CLINIC | Age: 65
End: 2025-06-02

## 2025-06-02 NOTE — TELEPHONE ENCOUNTER
Pt scheduled for surgery on 5.29.25 with Dr. Gutierrez     Pt reminder placed to appear 3-5 days after sx     Initiate TE for pt outreach     If pt was prescribed a Bone Growth Stimulator, fax the Op Note to Favian from [facility] at Fax #: [phone]

## 2025-06-02 NOTE — TELEPHONE ENCOUNTER
Attempted to call patient for post op outreach call, patient did not answer, will attempt call at a later time.

## 2025-06-03 ENCOUNTER — TELEPHONE (OUTPATIENT)
Dept: SURGERY | Facility: CLINIC | Age: 65
End: 2025-06-03

## 2025-06-03 NOTE — TELEPHONE ENCOUNTER
Future Appointments   Date Time Provider Department Center   6/4/2025  1:30 PM Alma Delia Patterson APRN [facility]   6/6/2025  1:20 PM Patrizia Louise MD [facility]   6/25/2025 11:20 AM YOVANA Gutierrez DO [facility]   7/29/2025 10:00 AM Oliver Pierre MD [facility]   8/14/2025  1:30 PM Amadeo Reed MD [facility]   8/19/2025  1:30 PM Alma Delia Patterson APRN [facility]   10/8/2025  2:30 PM Joselo Dunn MD [id number]     Called patient to confirm her appointment tomorrow.    Patient is scheduled for her 3 month post op with Dr. Reed and Alma Delia Patterson. Please advise if changes should be made

## 2025-06-03 NOTE — TELEPHONE ENCOUNTER
Message below noted.    Patient has 1 week post-op scheduled with Alma Delia for 6/04. Patient has 3 month post-op scheduled with Dr. Reed for 8/14/25.    Patient will need to complete MRI Brain prior to 8/14/25 follow-up. Order is in chart.

## 2025-06-03 NOTE — TELEPHONE ENCOUNTER
----- Message from Payal Orantes sent at 5/30/2025  2:03 PM CDT -----  Regarding: follow up  Please schedule 3 mo follow up with Dr. Reed for intracranial hypotension s/p LP shunt s/p ligation 5/29.  Patient needs MRI brain wwo prior to appt which has been ordered thanks!

## 2025-06-04 ENCOUNTER — OFFICE VISIT (OUTPATIENT)
Dept: SURGERY | Facility: CLINIC | Age: 65
End: 2025-06-04
Payer: MEDICARE

## 2025-06-04 VITALS
SYSTOLIC BLOOD PRESSURE: 110 MMHG | DIASTOLIC BLOOD PRESSURE: 78 MMHG | WEIGHT: 143 LBS | HEART RATE: 100 BPM | HEIGHT: 62 IN | BODY MASS INDEX: 26.31 KG/M2

## 2025-06-04 DIAGNOSIS — Z98.1 S/P CERVICAL SPINAL FUSION: Primary | ICD-10-CM

## 2025-06-04 PROCEDURE — 99024 POSTOP FOLLOW-UP VISIT: CPT

## 2025-06-04 NOTE — PATIENT INSTRUCTIONS
Refill policies:    Allow 2-3 business days for refills; controlled substances may take longer.  Contact your pharmacy at least 5 days prior to running out of medication and have them send an electronic request or submit request through the “request refill” option in your [employer] account.  Refills are not addressed on weekends; covering physicians do not authorize routine medications on weekends.  No narcotics or controlled substances are refilled after noon on Fridays or by on call physicians.  By law, narcotics must be electronically prescribed.  A 30 day supply with no refills is the maximum allowed.  If your prescription is due for a refill, you may be due for a follow up appointment.  To best provide you care, patients receiving routine medications need to be seen at least once a year.  Patients receiving narcotic/controlled substance medications need to be seen at least once every 3 months.  In the event that your preferred pharmacy does not have the requested medication in stock (e.g. Backordered), it is your responsibility to find another pharmacy that has the requested medication available.  We will gladly send a new prescription to that pharmacy at your request.    Scheduling Tests:    If your physician has ordered radiology tests such as MRI or CT scans, please contact Central Scheduling at [phone] right away to schedule the test.  Once scheduled, the [facility] Centralized Referral Team will work with your insurance carrier to obtain pre-certification or prior authorization.  Depending on your insurance carrier, approval may take 3-10 days.  It is highly recommended patients assure they have received an authorization before having a test performed.  If test is done without insurance authorization, patient may be responsible for the entire amount billed.      Precertification and Prior Authorizations:  If your physician has recommended that you have a procedure or additional testing performed the [facility]  Centralized Referral Team will contact your insurance carrier to obtain pre-certification or prior authorization.    You are strongly encouraged to contact your insurance carrier to verify that your procedure/test has been approved and is a COVERED benefit.  Although the [facility] Centralized Referral Team does its due diligence, the insurance carrier gives the disclaimer that \"Although the procedure is authorized, this does not guarantee payment.\"    Ultimately the patient is responsible for payment.   Thank you for your understanding in this matter.  Paperwork Completion:  If you require FMLA or disability paperwork for your recovery, please make sure to either drop it off or have it faxed to our office at [phone]. Be sure the form has your name and date of birth on it.  The form will be faxed to our Forms Department and they will complete it for you.  There is a 25$ fee for all forms that need to be filled out.  Please be aware there is a 10-14 day turnaround time.  You will need to sign a release of information (ANDREY) form if your paperwork does not come with one.  You may call the Forms Department with any questions at [phone].  Their fax number is [phone].

## 2025-06-04 NOTE — PROGRESS NOTES
[facility]  Neurosurgery Clinic Visit: Post-Op Follow Up  2025     Юлия Charles PCP: Patrizia Louise MD    1960 MRN [id number]     REASON FOR VISIT: 1 week post-op follow up    SUBJECTIVE     Юлия Charles is a pleasant 65 year old female here for follow up s/p right C4-C7 ACDF on 25 by Dr. Gutierrez.  She is doing well overall.  Upper extremity radiculopathy has resolved.  She reports some vocal hoarseness and slight difficulty swallowing dry foods, otherwise she is tolerating liquids and solids. Her balance is not quite at baseline so she is using a cane for support.  Continues to report daily headaches, relieved by Tylenol, unchanged from the headaches she has struggled with for the past 40+ years. She has discontinued all pain medications and muscle relaxants.  She denies any new pain, paresthesias or weakness. Denies incisional issues.    MEDICAL HISTORY     Past Medical History[1]   Past Surgical History[2]   Family History[3]   Social Hx on file[4]   Allergies[5]     MEDICATIONS     Current Medications[6]    REVIEW OF SYSTEMS     Denies fever, chills, shortness of breath, chest pain, or calf pain.     PHYSICAL EXAMINATION     VITALS: /78 (BP Location: Left arm, Patient Position: Sitting, Cuff Size: adult)   Pulse 100   Ht 62\"   Wt 143 lb (64.9 kg)   BMI 26.16 kg/m²     GENERAL: No acute distress, non-toxic appearing    MUSCULOSKELETAL: Moves all extremities freely.    SKIN: Warm, dry. Surgical incision is approximated with steri strips. Incision is clean, dry and intact without signs of drainage, edema, or erythema.       NEURO: Alert and oriented x3.  Face is symmetric.  Comprehension intact.     SPINE:  GAIT/COORDINATION:  Intact, non-antalgic.     SENSATION:  Intact to light touch to bilateral upper and lower extremities.    UPPER EXTREMITY STRENGTH:    Deltoid  Biceps  Triceps     Finger abduction     Right 5 5 5 5 5     Left 5 5 5 5 5     LOWER  EXTREMITY STRENGTH:    Iliospoas  Hamstrings  Quads  D-flexion  P-flexion EHL     Right 5 5 5 5 5 5     Left 5 5 5 5 5 5     IMAGING     No new imaging to review    ASSESSMENT AND PLAN     ASSESSMENT:   1. S/P cervical spinal fusion       PLAN:   No lifting greater than 10 lbs until next appointment  Wean out of cervical collar   Repeat XR cervical spine prior to 1 month follow-up  Reviewed activity restrictions and incisional care  F/U in 3 weeks with Dr. Brenda Patterson, MSN, APRN, FNP-[facility]  Neurosurgery   [address] [place] [address], [address]  [place] [address]  [phone]          [1]   Past Medical History:   Arthritis    Back pain    Bloating    Blood in urine    Breast cancer, right breast (HCC)    Formatting of this note might be different from the original.  S/p b/l mastectomy  In . On Aromacin      Calculus of kidney    Change in hair    Constipation    COPD (chronic obstructive pulmonary disease) (HCC)    No oxygen    Diarrhea, unspecified    Eye disease    Fatigue    Frequent urination    High cholesterol    History of blood transfusion    about 20 years ago    Intracranial tumor (HCC)    Irregular bowel habits    Pain in joints    Personal history of antineoplastic chemotherapy    Last treatment     PONV (postoperative nausea and vomiting)    Stool incontinence    Stress    Visual impairment    glasses    Wears glasses   [2]   Past Surgical History:  Procedure Laterality Date    Brain surgery      removal brain tumor          x2    Colonoscopy      Hysterectomy      Removal of kidney stone      Removal of lung,lobectomy Left     Tonsillectomy      Wom w/ cydney mastec or hx of mastec     [3]   Family History  Problem Relation Age of Onset    Heart Disease Father     Colon Polyps Mother     Substance Abuse Son     Diabetes Sister     Heart Disease Sister    [4]   Social History  Socioeconomic History    Marital status:    Tobacco Use     Smoking status: Former     Current packs/day: 0.00     Types: Cigarettes     Quit date: 2018     Years since quittin.7    Smokeless tobacco: Never   Vaping Use    Vaping status: Never Used   Substance and Sexual Activity    Alcohol use: Not Currently    Drug use: Not Currently     Types: Cocaine     Comment: long time ago   Other Topics Concern    Caffeine Concern Yes     Comment: coffee occ, pepsi    Exercise Yes     Comment: walking   [5]   Allergies  Allergen Reactions    Amoxicillin NAUSEA ONLY     \"At least 20 years ago\"    Tamoxifen OTHER (SEE COMMENTS)     Very emotional     [6]    butalbital-acetaminophen-caffeine -40 MG Oral Tab Take 1 tablet by mouth every 4 (four) hours as needed for Headaches. 20 tablet 0    Potassium Chloride ER 10 MEQ Oral Tab CR Take 1 tablet (10 mEq total) by mouth daily. 30 tablet 0    ALBUTEROL 108 (90 Base) MCG/ACT Inhalation Aero Soln INHALE 2 PUFFS INTO THE LUNGS EVERY 4 HOURS AS NEEDED FOR WHEEZING OR SHORTNESS OF BREATH 8.5 g 0    atorvastatin 10 MG Oral Tab TAKE 1 TABLET(10 MG) BY MOUTH EVERY NIGHT 90 tablet 0    Wheat Dextrin (BENEFIBER OR) Take by mouth twice a week.      acetaminophen 325 MG Oral Tab Take 1 tablet (325 mg total) by mouth every 6 (six) hours as needed for Pain.

## 2025-06-04 NOTE — PROGRESS NOTES
Established patient:  Reason for follow up: 1 week post op   Right anterior cervical 4-cervical 5, cervical 5-cervical 6, and cervical 6-cervical 7 discectomy and fusion with cage, bone graft, plate/screws,  lumboperitoneal shunt ligation Right       Numeric Rating Scale:      Pain at Present:  0/10       Distribution of Pain:    right    Most recent treatments for Current Pain Condition:   Surgery  Response to treatment: some relief

## 2025-06-06 ENCOUNTER — TELEPHONE (OUTPATIENT)
Dept: FAMILY MEDICINE CLINIC | Facility: CLINIC | Age: 65
End: 2025-06-06

## 2025-06-06 ENCOUNTER — OFFICE VISIT (OUTPATIENT)
Dept: FAMILY MEDICINE CLINIC | Facility: CLINIC | Age: 65
End: 2025-06-06
Payer: MEDICARE

## 2025-06-06 VITALS
SYSTOLIC BLOOD PRESSURE: 126 MMHG | WEIGHT: 144.38 LBS | DIASTOLIC BLOOD PRESSURE: 76 MMHG | TEMPERATURE: 98 F | BODY MASS INDEX: 26.57 KG/M2 | HEART RATE: 114 BPM | HEIGHT: 62 IN | RESPIRATION RATE: 16 BRPM | OXYGEN SATURATION: 99 %

## 2025-06-06 DIAGNOSIS — T81.49XA WOUND INFECTION FOLLOWING PROCEDURE: Primary | ICD-10-CM

## 2025-06-06 DIAGNOSIS — E87.6 HYPOKALEMIA: ICD-10-CM

## 2025-06-06 DIAGNOSIS — Z98.1 S/P CERVICAL SPINAL FUSION: Primary | ICD-10-CM

## 2025-06-06 RX ORDER — MUPIROCIN 20 MG/G
1 OINTMENT TOPICAL 3 TIMES DAILY
Qty: 1 G | Refills: 0 | Status: SHIPPED | OUTPATIENT
Start: 2025-06-06

## 2025-06-06 NOTE — PROGRESS NOTES
Chief Complaint   Patient presents with    Post-Op     Some soreness          [facility] [place]  Юлия Charles is a 65 year old F pt who presents today s/p right C4-C7 ACDF on 5/29/25 by Dr. Gutierrez     She reports feeling \"a little sore today\" and attributes this to increased activity yesterday when her sisters visited. Pt reports difficulty swallowing and sore throat.     The patient states she has been experiencing pain but has been reluctant to take the prescribed opioid medications, opting for Tylenol instead. She expresses concern about relying on pain medication.     Pt followed up with Neurosurgeon on 6/4 and dressing were removed at that time. Her next follow up is scheduled on 6/25.     ROS  As per [facility] [place]    Past Medical History[1]    Past Surgical History[2]    Social Hx on file[3]    Family History[4]     Medications Ordered Prior to Encounter[5]      Objective  Vitals:    06/06/25 1329   BP: 126/76   Pulse: 114   Resp: 16   Temp: 98.1 °F (36.7 °C)   SpO2: 99%   Weight: 144 lb 6.4 oz (65.5 kg)   Height: 5' 2\" (1.575 m)     Body mass index is 26.41 kg/m².    Physical Exam  Constitutional:       Appearance: Normal appearance.   HEENT:      Head: Normocephalic and atraumatic.      Neck: surgical scar healing well, c/d/I. Mild surrounding erythema noted  Cardiovascular:      Rate and Rhythm: Normal rate and regular rhythm.   Pulmonary:      Effort: Pulmonary effort is normal.      Breath sounds: Normal breath sounds.   Musculoskeletal:         General: Normal range of motion.   Skin:     General: Skin is warm and dry. Neck surgical scar healing well, c/d/I. Mild surrounding erythema noted  Neurological:      General: No focal deficit present.      Mental Status: Alert and oriented to person, place, and time.   Psychiatric:         Mood and Affect: Mood normal.         Thought Content: Thought content normal.       Assessment and Plan  Юлия was seen today for post-op.    Diagnoses and all orders for this visit:    S/P  cervical spinal fusion    Hypokalemia  Comments:  Recent K level 3.8       Encouraged patient to take pain medication as needed   Will provide mupirocin to use as needed if surrounding erythema worsens   Discussed red flag s/s of wound infection   Follow up with Neurosurg as scheduled  Pt verbalizes understanding, all questions/concerns addressed, in agreement w/plan      Follow up  Return in about 5 weeks (around 2025), or if symptoms worsen or fail to improve, for annual physical.      Patient Instructions  Patient Instructions   Continue wound care as instructed by Neurosurgery team   Recommend taking pain medication as needed   Will provide mupirocin to use as needed if surrounding redness worsen  Follow up with Neurosurgeon as scheduled       Patrizia Louise MD        [1]   Past Medical History:   Arthritis    Back pain    Bloating    Blood in urine    Breast cancer, right breast (HCC)    Formatting of this note might be different from the original.  S/p b/l mastectomy  In . On Aromacin      Calculus of kidney    Change in hair    Constipation    COPD (chronic obstructive pulmonary disease) (HCC)    No oxygen    Diarrhea, unspecified    Eye disease    Fatigue    Frequent urination    High cholesterol    History of blood transfusion    about 20 years ago    Intracranial tumor (HCC)    Irregular bowel habits    Pain in joints    Personal history of antineoplastic chemotherapy    Last treatment     PONV (postoperative nausea and vomiting)    Stool incontinence    Stress    Visual impairment    glasses    Wears glasses   [2]   Past Surgical History:  Procedure Laterality Date    Brain surgery      removal brain tumor          x2    Colonoscopy      Hysterectomy      Removal of kidney stone      Removal of lung,lobectomy Left     Tonsillectomy      Wom w/ cydney mastec or hx of mastec     [3]   Social History  Socioeconomic History    Marital status:    Tobacco Use    Smoking status:  Former     Current packs/day: 0.00     Types: Cigarettes     Quit date: 2018     Years since quittin.7    Smokeless tobacco: Never   Vaping Use    Vaping status: Never Used   Substance and Sexual Activity    Alcohol use: Not Currently    Drug use: Not Currently     Types: Cocaine     Comment: long time ago   Other Topics Concern    Caffeine Concern Yes     Comment: coffee occ, pepsi    Exercise Yes     Comment: walking   [4]   Family History  Problem Relation Age of Onset    Heart Disease Father     Colon Polyps Mother     Substance Abuse Son     Diabetes Sister     Heart Disease Sister    [5]   Current Outpatient Medications on File Prior to Visit   Medication Sig Dispense Refill    butalbital-acetaminophen-caffeine -40 MG Oral Tab Take 1 tablet by mouth every 4 (four) hours as needed for Headaches. 20 tablet 0    Potassium Chloride ER 10 MEQ Oral Tab CR Take 1 tablet (10 mEq total) by mouth daily. 30 tablet 0    ALBUTEROL 108 (90 Base) MCG/ACT Inhalation Aero Soln INHALE 2 PUFFS INTO THE LUNGS EVERY 4 HOURS AS NEEDED FOR WHEEZING OR SHORTNESS OF BREATH 8.5 g 0    atorvastatin 10 MG Oral Tab TAKE 1 TABLET(10 MG) BY MOUTH EVERY NIGHT 90 tablet 0    Wheat Dextrin (BENEFIBER OR) Take by mouth twice a week.      acetaminophen 325 MG Oral Tab Take 1 tablet (325 mg total) by mouth every 6 (six) hours as needed for Pain.       No current facility-administered medications on file prior to visit.

## 2025-06-06 NOTE — PATIENT INSTRUCTIONS
Continue wound care as instructed by Neurosurgery team   Recommend taking pain medication as needed   Will provide mupirocin to use as needed if surrounding redness worsen  Follow up with Neurosurgeon as scheduled

## 2025-06-13 ENCOUNTER — HOSPITAL ENCOUNTER (OUTPATIENT)
Dept: GENERAL RADIOLOGY | Age: 65
Discharge: HOME | End: 2025-06-13
Payer: MEDICARE

## 2025-06-13 DIAGNOSIS — Z98.1 S/P CERVICAL SPINAL FUSION: ICD-10-CM

## 2025-06-13 PROCEDURE — 72040 X-RAY EXAM NECK SPINE 2-3 VW: CPT

## 2025-06-24 DIAGNOSIS — J44.9 CHRONIC OBSTRUCTIVE PULMONARY DISEASE, UNSPECIFIED COPD TYPE (HCC): ICD-10-CM

## 2025-06-24 RX ORDER — ALBUTEROL SULFATE 90 UG/1
2 INHALANT RESPIRATORY (INHALATION) EVERY 4 HOURS PRN
Qty: 8.5 G | Refills: 0 | Status: SHIPPED | OUTPATIENT
Start: 2025-06-24

## 2025-06-24 NOTE — TELEPHONE ENCOUNTER
Last refill: 05/10/25  Qty 8.5 g  W/ 0 refills  Last ov: 06/06/25    Requested Prescriptions     Pending Prescriptions Disp Refills    ALBUTEROL 108 (90 Base) MCG/ACT Inhalation Aero Soln [Pharmacy Med Name: ALBUTEROL HFA INH (200 PUFFS) 8.5GM] 8.5 g 0     Sig: INHALE 2 PUFFS INTO THE LUNGS EVERY 4 HOURS AS NEEDED FOR WHEEZING OR SHORTNESS OF BREATH     Future Appointments   Date Time Provider Department Center   6/25/2025 11:20 AM YOVANA Gutierrez DO [facility]   7/29/2025 10:00 AM Oliver Pierre MD [facility]   8/14/2025  1:30 PM Amadeo Reed MD [facility]   8/18/2025 10:00 AM Alma Delia Patterson APRN [facility]   10/8/2025  2:30 PM Joselo Dunn MD [id number]

## 2025-06-25 ENCOUNTER — OFFICE VISIT (OUTPATIENT)
Dept: SURGERY | Facility: CLINIC | Age: 65
End: 2025-06-25
Payer: MEDICARE

## 2025-06-25 VITALS
HEIGHT: 62 IN | BODY MASS INDEX: 26.68 KG/M2 | HEART RATE: 95 BPM | OXYGEN SATURATION: 98 % | DIASTOLIC BLOOD PRESSURE: 76 MMHG | SYSTOLIC BLOOD PRESSURE: 114 MMHG | WEIGHT: 145 LBS

## 2025-06-25 DIAGNOSIS — Z98.1 S/P CERVICAL SPINAL FUSION: Primary | ICD-10-CM

## 2025-06-25 PROBLEM — R51.9 HEADACHE: Status: ACTIVE | Noted: 2025-06-25

## 2025-06-25 PROCEDURE — 99024 POSTOP FOLLOW-UP VISIT: CPT | Performed by: NEUROLOGICAL SURGERY

## 2025-06-25 NOTE — PROGRESS NOTES
S: patient doing well. Denies any new symptoms.  She feels great.    O: AVSS   Neuro: stable    A/P s/p ACDF  -start PT  -not using meds  -use bone stim  -F/U in 2 months

## 2025-06-25 NOTE — PROGRESS NOTES
Established patient:  Reason for follow up:   4 week post op, sx 05/29/25   Pt is doing well, denies any new pain or symptoms     Numeric Rating Scale:        Pain at Present: 4/10

## 2025-06-25 NOTE — PATIENT INSTRUCTIONS
Refill policies:    Allow 2-3 business days for refills; controlled substances may take longer.  Contact your pharmacy at least 5 days prior to running out of medication and have them send an electronic request or submit request through the “request refill” option in your [employer] account.  Refills are not addressed on weekends; covering physicians do not authorize routine medications on weekends.  No narcotics or controlled substances are refilled after noon on Fridays or by on call physicians.  By law, narcotics must be electronically prescribed.  A 30 day supply with no refills is the maximum allowed.  If your prescription is due for a refill, you may be due for a follow up appointment.  To best provide you care, patients receiving routine medications need to be seen at least once a year.  Patients receiving narcotic/controlled substance medications need to be seen at least once every 3 months.  In the event that your preferred pharmacy does not have the requested medication in stock (e.g. Backordered), it is your responsibility to find another pharmacy that has the requested medication available.  We will gladly send a new prescription to that pharmacy at your request.    Scheduling Tests:    If your physician has ordered radiology tests such as MRI or CT scans, please contact Central Scheduling at [phone] right away to schedule the test.  Once scheduled, the [facility] Centralized Referral Team will work with your insurance carrier to obtain pre-certification or prior authorization.  Depending on your insurance carrier, approval may take 3-10 days.  It is highly recommended patients assure they have received an authorization before having a test performed.  If test is done without insurance authorization, patient may be responsible for the entire amount billed.      Precertification and Prior Authorizations:  If your physician has recommended that you have a procedure or additional testing performed the [facility]  Centralized Referral Team will contact your insurance carrier to obtain pre-certification or prior authorization.    You are strongly encouraged to contact your insurance carrier to verify that your procedure/test has been approved and is a COVERED benefit.  Although the [facility] Centralized Referral Team does its due diligence, the insurance carrier gives the disclaimer that \"Although the procedure is authorized, this does not guarantee payment.\"    Ultimately the patient is responsible for payment.   Thank you for your understanding in this matter.  Paperwork Completion:  If you require FMLA or disability paperwork for your recovery, please make sure to either drop it off or have it faxed to our office at [phone]. Be sure the form has your name and date of birth on it.  The form will be faxed to our Forms Department and they will complete it for you.  There is a 25$ fee for all forms that need to be filled out.  Please be aware there is a 10-14 day turnaround time.  You will need to sign a release of information (ANDREY) form if your paperwork does not come with one.  You may call the Forms Department with any questions at [phone].  Their fax number is [phone].

## 2025-07-01 ENCOUNTER — OFFICE VISIT (OUTPATIENT)
Dept: PHYSICAL THERAPY | Age: 65
End: 2025-07-01
Payer: MEDICARE

## 2025-07-01 DIAGNOSIS — Z98.1 S/P CERVICAL SPINAL FUSION: Primary | ICD-10-CM

## 2025-07-01 PROCEDURE — 97140 MANUAL THERAPY 1/> REGIONS: CPT

## 2025-07-01 PROCEDURE — 97161 PT EVAL LOW COMPLEX 20 MIN: CPT

## 2025-07-01 PROCEDURE — 97110 THERAPEUTIC EXERCISES: CPT

## 2025-07-01 NOTE — PROGRESS NOTES
SPINE EVALUATION:     Diagnosis:   S/P cervical spinal fusion (Z98.1) Patient:  Юлия Charles (65 year old, female)        Referring Provider: Alma Delia Patterson  Today's Date   7/1/2025    Precautions:      Date of Evaluation: 07/01/25  Next MD visit: No data recorded  Date of Surgery: 5/29/25     PATIENT SUMMARY     Summary of chief complaints: neck pain and stiffness, shoulder weakness  History of current condition: Patient had Right anterior C4-C7 discectomy and fusion with cage, bone graft, plate/screws,  lumboperitoneal shunt ligation on 5/29. She has upper back and shoulder pain on both sides. She had dizziness in the past but feels like she doesn't really get it anymore. She likes to do a lot of yard work/gardening and wants to be able to do that. States that her surgeon instructed her to take things slow now. She wants to restore neck motion as much as possible and improve shoulder strength and pain.   Pain level: current 4 /10, at best 4 /10, at worst 4 /10  Description of symptoms: shoulder and neck pain   Occupation: retired   Leisure activities/Hobbies: yard work and gardening   Prior level of function: unrestried  Current limitations: difficulty turning head, looking up and down, arm weakness  Pt goals: improve strength and motion  Red flag signs/symptoms: Pt denies dizziness, drop attacks, dysphagia, dysarthria, diplopia; Pt denies changes in bowel/bladder function, saddle anesthesia; Pt denies pain that wakes in sleep, fever, recent trauma, history of CA, pain unchanged with movement/activity    Past medical history was reviewed with Юлия.  Significant findings include:    Imaging/Tests:     Юлия  has a past medical history of Arthritis, Back pain, Bloating, Blood in urine, Breast cancer, right breast (HCC) (10/24/2013), Calculus of kidney, Change in hair, Constipation, COPD (chronic obstructive pulmonary disease) ([facility]), Diarrhea, unspecified, Eye disease, Fatigue, Frequent  urination, High cholesterol, History of blood transfusion, Intracranial tumor (HCC) (2021), Irregular bowel habits, Pain in joints, Personal history of antineoplastic chemotherapy, PONV (postoperative nausea and vomiting), Stool incontinence, Stress, Visual impairment, and Wears glasses.  Юлия  has a past surgical history that includes wom w/ cydney mastec or hx of mastec; hysterectomy; tonsillectomy; Brain Surgery (); removal of lung,lobectomy (Left); removal of kidney stone; ; colonoscopy; and cervical spine surgery (Right, 2025).    ASSESSMENT  Юлия presents to physical therapy evaluation with primary c/o neck pain and stiffness, shoulder weakness. The results of the objective tests and measures show decreased cervical mobility, decreased UE strength. Functional deficits include but are not limited to difficulty turning head, looking up and down, arm weakness. Signs and symptoms are consistent with diagnosis of S/P cervical spinal fusion (Z98.1). Pt and PT discussed evaluation findings, pathology, POC and HEP.  Pt voiced understanding and performs HEP correctly without reported pain. Skilled Physical Therapy is medically necessary to address the above impairments and reach functional goals.    OBJECTIVE:      Musculoskeletal:  Palpation:   Ttp upper traps, cervical paraspinals B    ROM and Strength:    Cervical ROM  Flexion (active) 32   Extension (active) 30      L - Active R - Active   Lateral Flexion 15 20   Rotation 46 44     Neck Strength  Flexion 4-   Extension 4       LEFT RIGHT   Lateral Flexion 4- 4-   Rotation 4- 4-     Upper Extremity Strength   LEFT RIGHT   Shoulder Flexion 4- 4+   Shoulder ABD 4- 4   Shoulder Extension 4 4   Horizontal ABD - Middle Trapezius 4 4   Horizontal ABD - Lower Trapezius 4- 4   Shoulder IR @ neutral 4+ 4+   Shoulder ER @ neutral 4- 4-   Shoulder IR @ 90-90 4- 4-   Shoulder ER @ 90-90 4- 4-   Elbow Flexion 4+ 4+   Elbow Extension 4+ 4+        Balance and Functional Mobility:  -overhead press: L4#, R 7#         Today's Treatment and Response:   Pt education was provided on exam findings, treatment diagnosis, treatment plan, expectations, and prognosis.    Today's Treatment       7/1/2025   Spine Treatment   Manual Therapy -STM upper traps 8 min   Total Time Of Timed Procedures 0   Total Time Of Service-Based Procedures 0   Total Treatment Time 0   HEP Access Code: [email or web address]  URL: [email or web address]  Date: 07/01/2025  Prepared by: Mack Dale    Exercises  - Standing Cervical Sidebending AROM  - 5 x daily - 7 x weekly - 3 sets - 10 reps  - Supine Cervical Rotation AROM on Pillow  - 5 x daily - 7 x weekly - 3 sets - 10 reps  - Standing Cervical Extension AROM  - 5 x daily - 7 x weekly - 3 sets - 10 reps  - Standing Cervical Flexion AROM  - 5 x daily - 7 x weekly - 3 sets - 10 reps  - Seated Isometric Cervical Sidebending  - 5 x daily - 7 x weekly - 3 sets - 10 reps - 10 hold  - Standing Isometric Cervical Flexion with Manual Resistance  - 5 x daily - 7 x weekly - 5 reps - 10 hold  - Standing Isometric Cervical Extension with Manual Resistance  - 5 x daily - 7 x weekly - 5 reps - 10 hold  - Seated Isometric Cervical Rotation  - 5 x daily - 7 x weekly - 5 reps - 10 hold  - Shoulder External Rotation and Scapular Retraction with Resistance  - 1 x daily - 7 x weekly - 3 sets - 10 reps  - Standing Shoulder Row with Anchored Resistance  - 1 x daily - 7 x weekly - 3 sets - 10 reps        Patient was instructed in and issued a HEP for: Access Code: [email or web address]  URL: [email or web address]  Date: 07/01/2025  Prepared by: Mack Bosherlyen    Exercises  - Standing Cervical Sidebending AROM  - 5 x daily - 7 x weekly - 3 sets - 10 reps  - Supine Cervical Rotation AROM on Pillow  - 5 x daily - 7 x weekly - 3 sets - 10 reps  - Standing Cervical Extension AROM  - 5 x daily - 7 x weekly - 3 sets - 10 reps  - Standing Cervical Flexion  AROM  - 5 x daily - 7 x weekly - 3 sets - 10 reps  - Seated Isometric Cervical Sidebending  - 5 x daily - 7 x weekly - 3 sets - 10 reps - 10 hold  - Standing Isometric Cervical Flexion with Manual Resistance  - 5 x daily - 7 x weekly - 5 reps - 10 hold  - Standing Isometric Cervical Extension with Manual Resistance  - 5 x daily - 7 x weekly - 5 reps - 10 hold  - Seated Isometric Cervical Rotation  - 5 x daily - 7 x weekly - 5 reps - 10 hold  - Shoulder External Rotation and Scapular Retraction with Resistance  - 1 x daily - 7 x weekly - 3 sets - 10 reps  - Standing Shoulder Row with Anchored Resistance  - 1 x daily - 7 x weekly - 3 sets - 10 reps    Charges:  PT EVAL: Low Complexity, evaluation, 1 TE, 1MT  In agreement with evaluation findings and clinical rationale, this evaluation involved LOW COMPLEXITY decision making due to no personal factors/comorbidities, 1-2 body structures involved/activity limitations, and stable symptoms as documented in the evaluation.                                                                         PLAN OF CARE:      Goals: (to be met in 12 visits)  STG  -Patient will report 40% improvement in symptoms as evidence of subjective improvement in overall condition.    LTG  -Patient will report 70% improvement in symptoms as evidence of subjective improvement in overall condition.  -Patient will increase cervical flex AROM to 40 degrees for efficient and pain-free visual survey of their environment during all activities.  -Patient will increase cervical extension AROM to 40 degrees for efficient and pain-free visual survey of their environment during all activities.  -Patient will increase cervical rotation B AROM to 55 degrees for efficient and pain-free visual survey of their environment during all activities.  -Patient will increase strength grade of all neck and UE muscle groups to 5/5 to increase resilience to physical stressors and improve performance of activities such as  picking up objects or reaching overhead.  -Patient will be able to overhead press 10# to be able to complete all lifting and reaching during household tasks.  -Patient will achieve a score on the Neck Disability Index of 20% as evidence of improved functional capacity during everyday activity.  -Patient will demonstrate and/or verbalize competence in performance of advanced HEP to be able to progress exercise on their own following discharge from physical therapy.       Frequency / Duration: Patient will be seen 1-2x/week or a total of 12  visits over a 90 day period. Treatment will include: Manual Therapy; Neuromuscular Re-education; Home Exercise Program instruction; Therapeutic Exercise; Therapeutic Activities; Patient/Family Education    Education or treatment limitation: None   Rehab Potential: good     Neck Disability Index Score  Score: (Patient-Rptd) 38 % (7/1/2025 12:40 PM)      Patient/Family/Caregiver was advised of these findings, precautions, and treatment options and has agreed to actively participate in planning and for this course of care.    Thank you for your referral. Please co-sign or sign and return this letter via fax as soon as possible to [phone]. If you have any questions, please contact me at Dept: [phone]    Sincerely,  Electronically signed by therapist: Mack Dale PT  Physician's certification required: Yes  I certify the need for these services furnished under this plan of treatment and while under my care.    X___________________________________________________ Date____________________    Certification From: 7/1/2025  To: 9/29/2025

## 2025-07-03 ENCOUNTER — OFFICE VISIT (OUTPATIENT)
Dept: PHYSICAL THERAPY | Age: 65
End: 2025-07-03
Payer: MEDICARE

## 2025-07-03 PROCEDURE — 97110 THERAPEUTIC EXERCISES: CPT

## 2025-07-03 PROCEDURE — 97140 MANUAL THERAPY 1/> REGIONS: CPT

## 2025-07-03 PROCEDURE — 97112 NEUROMUSCULAR REEDUCATION: CPT

## 2025-07-03 NOTE — PROGRESS NOTES
Patient: Юлия Charles (65 year old, female) Referring Provider:  Insurance:   Diagnosis: S/P cervical spinal fusion (Z98.1) Alma Delia Patterson  MEDICARE   Date of Surgery: 5/29/25 Next MD visit:  KAL COLE   Precautions:  None No data recorded Referral Information:    Date of Evaluation: Req: 0, Auth: 0, Exp:     07/01/25 POC Auth Visits:          Today's Date   7/3/2025    Subjective  Patient reports she did not do as many reps on HEP as written, told her that is what was intended. Shoulder and neck just feeling a little bit stiff today.       Pain: 1/10     Objective          See treatment below     Assessment  Patient feels very fatigued by exercises performed today, isometrics/head lifts as well as parascapular training. We had indepth discussion about activity monitoring for preventing aggravation of symptoms in between visits, patient verbalized understanding.    Goals (to be met in 12 visits)   STG  -Patient will report 40% improvement in symptoms as evidence of subjective improvement in overall condition.     LTG  -Patient will report 70% improvement in symptoms as evidence of subjective improvement in overall condition.  -Patient will increase cervical flex AROM to 40 degrees for efficient and pain-free visual survey of their environment during all activities.  -Patient will increase cervical extension AROM to 40 degrees for efficient and pain-free visual survey of their environment during all activities.  -Patient will increase cervical rotation B AROM to 55 degrees for efficient and pain-free visual survey of their environment during all activities.  -Patient will increase strength grade of all neck and UE muscle groups to 5/5 to increase resilience to physical stressors and improve performance of activities such as picking up objects or reaching overhead.  -Patient will be able to overhead press 10# to be able to complete all lifting and reaching during household tasks.  -Patient will achieve a score  on the Neck Disability Index of 20% as evidence of improved functional capacity during everyday activity.  -Patient will demonstrate and/or verbalize competence in performance of advanced HEP to be able to progress exercise on their own following discharge from physical therapy.     Plan  Continue progressive motion and strengthening activities to tolerance for neck, parascapular strengthening to prevent upper trap compensation causing compression of cervical region.    Treatment Last 4 Visits  Treatment Day: 2       5/8/2025 7/1/2025 7/3/2025   Spine Treatment   Therapeutic Exercise Seated bilateral shoulder ER green band 2 x 15  Supine horizontal abd green band 2 x 15  Seated shoulder rows green band 2 x 15  Reviewed HEP/POC  -UBE UE L2 5 min   -chin tuck + head lift [id number]  -prone shoulder I palms up 2x10  -prone shoulder I palms down 1x10       Neuro Re-Education   *Neuromuscular facilitation - 15 min*; multiple reps performed for each  The patient's ability to perform a quality isometric contraction against manual resistance with specific muscles was assessed. Patient was passively placed in testing positions to prevent compensatory muscle use to attain starting position. The muscles listed below were found to be inhibited. PT provided brief, targeted palpation of the distal and proximal muscle belly w/ deep pressure to stimulate the muscle with mechanical/sensory input to increase neuromuscular junction activity and improve contraction quality. PT performed repetitive re-testing of isometric contraction quality specific to targeted muscle after palpation until the desired effect was observed.    Neck  -B sternocleidomastoid  -B scalenes  -B multifidus cervicus  -B cervical flexors (longus colli and longus capitis)  -B cervical extensors  -B cervical  rotators    ----------------------------------------------------------------------------------------------------------------------------------------------------------------         Manual Therapy  -STM upper traps 8 min -STM cervical paraspinals and upper traps 10 min   Therapeutic Exercise Minutes 8  13   Neuro Re-Educ Minutes   15   Manual Therapy Minutes   10   Evaluation Minutes 25     Total Time Of Timed Procedures 8 0 38   Total Time Of Service-Based Procedures 25 0 0   Total Treatment Time 33 0 38   HEP Seated bilateral shoulder ER green band 2 x 15  Supine horizontal abd green band 2 x 15  Seated shoulder rows green band 2 x 15  Reviewed HEP/POC Access Code: [email or web address]  URL: [email or web address]  Date: 07/01/2025  Prepared by: Mack Dale    Exercises  - Standing Cervical Sidebending AROM  - 5 x daily - 7 x weekly - 3 sets - 10 reps  - Supine Cervical Rotation AROM on Pillow  - 5 x daily - 7 x weekly - 3 sets - 10 reps  - Standing Cervical Extension AROM  - 5 x daily - 7 x weekly - 3 sets - 10 reps  - Standing Cervical Flexion AROM  - 5 x daily - 7 x weekly - 3 sets - 10 reps  - Seated Isometric Cervical Sidebending  - 5 x daily - 7 x weekly - 3 sets - 10 reps - 10 hold  - Standing Isometric Cervical Flexion with Manual Resistance  - 5 x daily - 7 x weekly - 5 reps - 10 hold  - Standing Isometric Cervical Extension with Manual Resistance  - 5 x daily - 7 x weekly - 5 reps - 10 hold  - Seated Isometric Cervical Rotation  - 5 x daily - 7 x weekly - 5 reps - 10 hold  - Shoulder External Rotation and Scapular Retraction with Resistance  - 1 x daily - 7 x weekly - 3 sets - 10 reps  - Standing Shoulder Row with Anchored Resistance  - 1 x daily - 7 x weekly - 3 sets - 10 reps         HEP  Access Code: [email or web address]  URL: [email or web address]  Date: 07/01/2025  Prepared by: Mack Dale    Exercises  - Standing Cervical Sidebending AROM  - 5 x daily - 7 x weekly - 3 sets - 10  reps  - Supine Cervical Rotation AROM on Pillow  - 5 x daily - 7 x weekly - 3 sets - 10 reps  - Standing Cervical Extension AROM  - 5 x daily - 7 x weekly - 3 sets - 10 reps  - Standing Cervical Flexion AROM  - 5 x daily - 7 x weekly - 3 sets - 10 reps  - Seated Isometric Cervical Sidebending  - 5 x daily - 7 x weekly - 3 sets - 10 reps - 10 hold  - Standing Isometric Cervical Flexion with Manual Resistance  - 5 x daily - 7 x weekly - 5 reps - 10 hold  - Standing Isometric Cervical Extension with Manual Resistance  - 5 x daily - 7 x weekly - 5 reps - 10 hold  - Seated Isometric Cervical Rotation  - 5 x daily - 7 x weekly - 5 reps - 10 hold  - Shoulder External Rotation and Scapular Retraction with Resistance  - 1 x daily - 7 x weekly - 3 sets - 10 reps  - Standing Shoulder Row with Anchored Resistance  - 1 x daily - 7 x weekly - 3 sets - 10 reps    Charges  1TE, 1MT, 1TA

## 2025-07-08 ENCOUNTER — OFFICE VISIT (OUTPATIENT)
Dept: PHYSICAL THERAPY | Age: 65
End: 2025-07-08
Payer: MEDICARE

## 2025-07-08 PROCEDURE — 97140 MANUAL THERAPY 1/> REGIONS: CPT

## 2025-07-08 PROCEDURE — 97110 THERAPEUTIC EXERCISES: CPT

## 2025-07-08 NOTE — PROGRESS NOTES
Patient: Юлия Charles (65 year old, female) Referring Provider:  Insurance:   Diagnosis: S/P cervical spinal fusion (Z98.1) Alma Delia Patterson  MEDICARE   Date of Surgery: 5/29/25 Next MD visit:  KAL COLE   Precautions:  None No data recorded Referral Information:    Date of Evaluation: Req: 0, Auth: 0, Exp:     07/01/25 POC Auth Visits:          Today's Date   7/8/2025    Subjective  Patient reports she was not sore after last visit. Today she is in more pain, thinks it is due to barometric pressure from weather change.       Pain: 4/10     Objective          See treatment below     Assessment  Patient w/ moderate relief of discomfort w/ STM intervention. Adequately challenged by isometrics and prone scapular exercises, but states it does not increase pain. Difficulty completing overhead press on L arm more so than R, but still able to match rep count. Continue scapular muscle training to prevent compression overload of upper trap on neck w/ overhead motion. Continue progression of neck and UE muscle strength/endurance per tolerance.      Interventions in today's session were performed with the goal of improving cervical and upper quarter muscle endurance for increased activity tolerance. Clinical judgement and ongoing assessment were used to adjust intervention based on patient's response. Skilled PT guided patient through one-on-one treatment providing verbal, visual, tactile cues, guided exercise selection (type, volume, intensity, complexity), and appropriate rest breaks to maximize treatment effect. Treatment time includes time to complete interventions as well as time spent demonstrating proper technique and educating on effect/purpose of each intervention. The patient requires continued physical therapy treatment to achieve the current established goals. Answered all patient questions throughout session.      Goals (to be met in 12 visits)   STG  -Patient will report 40% improvement in symptoms as  evidence of subjective improvement in overall condition.     LTG  -Patient will report 70% improvement in symptoms as evidence of subjective improvement in overall condition.  -Patient will increase cervical flex AROM to 40 degrees for efficient and pain-free visual survey of their environment during all activities.  -Patient will increase cervical extension AROM to 40 degrees for efficient and pain-free visual survey of their environment during all activities.  -Patient will increase cervical rotation B AROM to 55 degrees for efficient and pain-free visual survey of their environment during all activities.  -Patient will increase strength grade of all neck and UE muscle groups to 5/5 to increase resilience to physical stressors and improve performance of activities such as picking up objects or reaching overhead.  -Patient will be able to overhead press 10# to be able to complete all lifting and reaching during household tasks.  -Patient will achieve a score on the Neck Disability Index of 20% as evidence of improved functional capacity during everyday activity.  -Patient will demonstrate and/or verbalize competence in performance of advanced HEP to be able to progress exercise on their own following discharge from physical therapy.         Plan  Continue progressive motion and strengthening activities to tolerance for neck, parascapular strengthening to prevent upper trap compensation causing compression of cervical region.    Treatment Last 4 Visits  Treatment Day: 3       5/8/2025 7/1/2025 7/3/2025 7/8/2025   Spine Treatment   Therapeutic Exercise Seated bilateral shoulder ER green band 2 x 15  Supine horizontal abd green band 2 x 15  Seated shoulder rows green band 2 x 15  Reviewed HEP/POC  -UBE UE L2 5 min   -chin tuck + head lift [id number]  -prone shoulder I palms up 2x10  -prone shoulder I palms down 1x10     -sidelying cervcial lateral flexion iso holds [id number]  -supine chin tuck head lift iso  [id number]  -prone shoulder I palms down 2x10  -prone shoulder I palms up 1x10  -prone shoulder T 2x10  -unilateral overhead press 1x20x2# B     Neuro Re-Education   *Neuromuscular facilitation - 15 min*; multiple reps performed for each  The patient's ability to perform a quality isometric contraction against manual resistance with specific muscles was assessed. Patient was passively placed in testing positions to prevent compensatory muscle use to attain starting position. The muscles listed below were found to be inhibited. PT provided brief, targeted palpation of the distal and proximal muscle belly w/ deep pressure to stimulate the muscle with mechanical/sensory input to increase neuromuscular junction activity and improve contraction quality. PT performed repetitive re-testing of isometric contraction quality specific to targeted muscle after palpation until the desired effect was observed.    Neck  -B sternocleidomastoid  -B scalenes  -B multifidus cervicus  -B cervical flexors (longus colli and longus capitis)  -B cervical extensors  -B cervical rotators    ----------------------------------------------------------------------------------------------------------------------------------------------------------------          Manual Therapy  -STM upper traps 8 min -STM cervical paraspinals and upper traps 10 min -STM thoracic and cervcial paraspinals, R parascapular and RTC muscles 15 min   Therapeutic Exercise Minutes 8  13 23   Neuro Re-Educ Minutes   15    Manual Therapy Minutes   10 15   Evaluation Minutes 25      Total Time Of Timed Procedures 8 0 38 38   Total Time Of Service-Based Procedures 25 0 0 0   Total Treatment Time 33 0 38 38   HEP Seated bilateral shoulder ER green band 2 x 15  Supine horizontal abd green band 2 x 15  Seated shoulder rows green band 2 x 15  Reviewed HEP/POC Access Code: [email or web address]  URL: [email or web address]  Date: 07/01/2025  Prepared by: Mack  Boilesen    Exercises  - Standing Cervical Sidebending AROM  - 5 x daily - 7 x weekly - 3 sets - 10 reps  - Supine Cervical Rotation AROM on Pillow  - 5 x daily - 7 x weekly - 3 sets - 10 reps  - Standing Cervical Extension AROM  - 5 x daily - 7 x weekly - 3 sets - 10 reps  - Standing Cervical Flexion AROM  - 5 x daily - 7 x weekly - 3 sets - 10 reps  - Seated Isometric Cervical Sidebending  - 5 x daily - 7 x weekly - 3 sets - 10 reps - 10 hold  - Standing Isometric Cervical Flexion with Manual Resistance  - 5 x daily - 7 x weekly - 5 reps - 10 hold  - Standing Isometric Cervical Extension with Manual Resistance  - 5 x daily - 7 x weekly - 5 reps - 10 hold  - Seated Isometric Cervical Rotation  - 5 x daily - 7 x weekly - 5 reps - 10 hold  - Shoulder External Rotation and Scapular Retraction with Resistance  - 1 x daily - 7 x weekly - 3 sets - 10 reps  - Standing Shoulder Row with Anchored Resistance  - 1 x daily - 7 x weekly - 3 sets - 10 reps          HEP  Access Code: [email or web address]  URL: [email or web address]  Date: 07/01/2025  Prepared by: Mack Boilesen    Exercises  - Standing Cervical Sidebending AROM  - 5 x daily - 7 x weekly - 3 sets - 10 reps  - Supine Cervical Rotation AROM on Pillow  - 5 x daily - 7 x weekly - 3 sets - 10 reps  - Standing Cervical Extension AROM  - 5 x daily - 7 x weekly - 3 sets - 10 reps  - Standing Cervical Flexion AROM  - 5 x daily - 7 x weekly - 3 sets - 10 reps  - Seated Isometric Cervical Sidebending  - 5 x daily - 7 x weekly - 3 sets - 10 reps - 10 hold  - Standing Isometric Cervical Flexion with Manual Resistance  - 5 x daily - 7 x weekly - 5 reps - 10 hold  - Standing Isometric Cervical Extension with Manual Resistance  - 5 x daily - 7 x weekly - 5 reps - 10 hold  - Seated Isometric Cervical Rotation  - 5 x daily - 7 x weekly - 5 reps - 10 hold  - Shoulder External Rotation and Scapular Retraction with Resistance  - 1 x daily - 7 x weekly - 3 sets - 10 reps  -  Standing Shoulder Row with Anchored Resistance  - 1 x daily - 7 x weekly - 3 sets - 10 reps    Charges  1MT, 2TE

## 2025-07-15 ENCOUNTER — OFFICE VISIT (OUTPATIENT)
Dept: PHYSICAL THERAPY | Age: 65
End: 2025-07-15
Payer: MEDICARE

## 2025-07-15 PROCEDURE — 97140 MANUAL THERAPY 1/> REGIONS: CPT

## 2025-07-15 PROCEDURE — 97530 THERAPEUTIC ACTIVITIES: CPT

## 2025-07-15 NOTE — PROGRESS NOTES
Patient: Юлия Charles (65 year old, female) Referring Provider:  Insurance:   Diagnosis: S/P cervical spinal fusion (Z98.1) [facility]  MEDICARE   Date of Surgery: 5/29/25 Next MD visit:  KAL COLE   Precautions:  None No data recorded Referral Information:    Date of Evaluation: Req: 0, Auth: 0, Exp:     07/01/25 POC Auth Visits:          Today's Date   7/15/2025    Subjective  Patient reports swallowing has been a little more challenging since the operation, usually has to drink more fluids while eating to get things down comfortably.       Pain: 4/10     Objective       Assessment  Patient with moderate tension of anterior neck that was at times with increased sensitivity. Began session with emphasis on manual intervention including cross friction massage at incision site, STM to anterior/lateral cervical muscles of spine, and distraction technique. Attempted right SCM release but was not tolerated due to radicular symptoms of RUE, modified manual to improve response. Patient was then instructed through seated scapular retraction AROM, and standing bilateral rows and shoulder extensions. Patient would benefit from continued periscapular strength and endurance to better tolerate trips out in the community.      Goals (to be met in 12 visits)   STG  -Patient will report 40% improvement in symptoms as evidence of subjective improvement in overall condition.     LTG  -Patient will report 70% improvement in symptoms as evidence of subjective improvement in overall condition.  -Patient will increase cervical flex AROM to 40 degrees for efficient and pain-free visual survey of their environment during all activities.  -Patient will increase cervical extension AROM to 40 degrees for efficient and pain-free visual survey of their environment during all activities.  -Patient will increase cervical rotation B AROM to 55 degrees for efficient and pain-free visual survey of their environment during all  activities.  -Patient will increase strength grade of all neck and UE muscle groups to 5/5 to increase resilience to physical stressors and improve performance of activities such as picking up objects or reaching overhead.  -Patient will be able to overhead press 10# to be able to complete all lifting and reaching during household tasks.  -Patient will achieve a score on the Neck Disability Index of 20% as evidence of improved functional capacity during everyday activity.  -Patient will demonstrate and/or verbalize competence in performance of advanced HEP to be able to progress exercise on their own following discharge from physical therapy.        Plan  Trial supine band exercises for better lower trap activation/control    Treatment Last 4 Visits  Treatment Day: 4       7/1/2025 7/3/2025 7/8/2025 7/15/2025   Spine Treatment   Therapeutic Exercise  -UBE UE L2 5 min   -chin tuck + head lift [id number]  -prone shoulder I palms up 2x10  -prone shoulder I palms down 1x10     -sidelying cervcial lateral flexion iso holds [id number]  -supine chin tuck head lift iso [id number]  -prone shoulder I palms down 2x10  -prone shoulder I palms up 1x10  -prone shoulder T 2x10  -unilateral overhead press 1x20x2# B   Seated Scapular Retraction 2 x 10 3\" H    Neuro Re-Education  *Neuromuscular facilitation - 15 min*; multiple reps performed for each  The patient's ability to perform a quality isometric contraction against manual resistance with specific muscles was assessed. Patient was passively placed in testing positions to prevent compensatory muscle use to attain starting position. The muscles listed below were found to be inhibited. PT provided brief, targeted palpation of the distal and proximal muscle belly w/ deep pressure to stimulate the muscle with mechanical/sensory input to increase neuromuscular junction activity and improve contraction quality. PT performed repetitive re-testing of isometric contraction quality specific to  targeted muscle after palpation until the desired effect was observed.    Neck  -B sternocleidomastoid  -B scalenes  -B multifidus cervicus  -B cervical flexors (longus colli and longus capitis)  -B cervical extensors  -B cervical rotators    ----------------------------------------------------------------------------------------------------------------------------------------------------------------           Manual Therapy -STM upper traps 8 min -STM cervical paraspinals and upper traps 10 min -STM thoracic and cervcial paraspinals, R parascapular and RTC muscles 15 min STM (Supine): Subocc, UT, SCM, Scalenes   Cervical Distraction 3 x 1 min (bias of anterior neck)  Gentle Cross Friction Massage (Supine): Incision Site x 5 min  STM (Prone): poor tolerance even with gentle pressure of periscap   Cupping (Prone): Periscap (B)   Therapeutic Activity    Shoulder Ext 2 x 10; Green   Low Rows 2 x 10 ; Green  Education of mind/muscle connection x 3 min      Therapeutic Exercise Minutes  13 23 5   Neuro Re-Educ Minutes  15     Manual Therapy Minutes  10 15 23   Therapeutic Activity Minutes    10   Total Time Of Timed Procedures 0 38 38 38   Total Time Of Service-Based Procedures 0 0 0 0   Total Treatment Time 0 38 38 38   [facility] Access Code: [email or web address]  URL: [email or web address]  Date: 07/01/2025  Prepared by: Mack Dale    Exercises  - Standing Cervical Sidebending AROM  - 5 x daily - 7 x weekly - 3 sets - 10 reps  - Supine Cervical Rotation AROM on Pillow  - 5 x daily - 7 x weekly - 3 sets - 10 reps  - Standing Cervical Extension AROM  - 5 x daily - 7 x weekly - 3 sets - 10 reps  - Standing Cervical Flexion AROM  - 5 x daily - 7 x weekly - 3 sets - 10 reps  - Seated Isometric Cervical Sidebending  - 5 x daily - 7 x weekly - 3 sets - 10 reps - 10 hold  - Standing Isometric Cervical Flexion with Manual Resistance  - 5 x daily - 7 x weekly - 5 reps - 10 hold  - Standing Isometric Cervical Extension with  Manual Resistance  - 5 x daily - 7 x weekly - 5 reps - 10 hold  - Seated Isometric Cervical Rotation  - 5 x daily - 7 x weekly - 5 reps - 10 hold  - Shoulder External Rotation and Scapular Retraction with Resistance  - 1 x daily - 7 x weekly - 3 sets - 10 reps  - Standing Shoulder Row with Anchored Resistance  - 1 x daily - 7 x weekly - 3 sets - 10 reps   Access Code: [email or web address]  URL: [email or web address]  Date: 07/01/2025  Prepared by: Mack Dale     Exercises  - Standing Cervical Sidebending AROM  - 5 x daily - 7 x weekly - 3 sets - 10 reps  - Supine Cervical Rotation AROM on Pillow  - 5 x daily - 7 x weekly - 3 sets - 10 reps  - Standing Cervical Extension AROM  - 5 x daily - 7 x weekly - 3 sets - 10 reps  - Standing Cervical Flexion AROM  - 5 x daily - 7 x weekly - 3 sets - 10 reps  - Seated Isometric Cervical Sidebending  - 5 x daily - 7 x weekly - 3 sets - 10 reps - 10 hold  - Standing Isometric Cervical Flexion with Manual Resistance  - 5 x daily - 7 x weekly - 5 reps - 10 hold  - Standing Isometric Cervical Extension with Manual Resistance  - 5 x daily - 7 x weekly - 5 reps - 10 hold  - Seated Isometric Cervical Rotation  - 5 x daily - 7 x weekly - 5 reps - 10 hold  - Shoulder External Rotation and Scapular Retraction with Resistance  - 1 x daily - 7 x weekly - 3 sets - 10 reps  - Standing Shoulder Row with Anchored Resistance  - 1 x daily - 7 x weekly - 3 sets - 10 reps        [facility]  Access Code: [email or web address]  URL: [email or web address]  Date: 07/01/2025  Prepared by: Mack Delarosaen     Exercises  - Standing Cervical Sidebending AROM  - 5 x daily - 7 x weekly - 3 sets - 10 reps  - Supine Cervical Rotation AROM on Pillow  - 5 x daily - 7 x weekly - 3 sets - 10 reps  - Standing Cervical Extension AROM  - 5 x daily - 7 x weekly - 3 sets - 10 reps  - Standing Cervical Flexion AROM  - 5 x daily - 7 x weekly - 3 sets - 10 reps  - Seated Isometric Cervical Sidebending  - 5 x daily  - 7 x weekly - 3 sets - 10 reps - 10 hold  - Standing Isometric Cervical Flexion with Manual Resistance  - 5 x daily - 7 x weekly - 5 reps - 10 hold  - Standing Isometric Cervical Extension with Manual Resistance  - 5 x daily - 7 x weekly - 5 reps - 10 hold  - Seated Isometric Cervical Rotation  - 5 x daily - 7 x weekly - 5 reps - 10 hold  - Shoulder External Rotation and Scapular Retraction with Resistance  - 1 x daily - 7 x weekly - 3 sets - 10 reps  - Standing Shoulder Row with Anchored Resistance  - 1 x daily - 7 x weekly - 3 sets - 10 reps    Charges  Manual x 2, Ther Act x 1

## 2025-07-17 ENCOUNTER — OFFICE VISIT (OUTPATIENT)
Dept: PHYSICAL THERAPY | Age: 65
End: 2025-07-17
Payer: MEDICARE

## 2025-07-17 PROCEDURE — 97140 MANUAL THERAPY 1/> REGIONS: CPT

## 2025-07-17 PROCEDURE — 97112 NEUROMUSCULAR REEDUCATION: CPT

## 2025-07-17 PROCEDURE — 97110 THERAPEUTIC EXERCISES: CPT

## 2025-07-17 NOTE — PROGRESS NOTES
Patient: Юлия Charles (65 year old, female) Referring Provider:  Insurance:   Diagnosis: S/P cervical spinal fusion (Z98.1) [facility]  MEDICARE   Date of Surgery: 5/29/25 Next MD visit:  KAL COLE   Precautions:  None No data recorded Referral Information:    Date of Evaluation: Req: 0, Auth: 0, Exp:     07/01/25 POC Auth Visits:          Today's Date   7/17/2025    Subjective  Patient reports it was so hot yesterday she held off her trip to the the store and went today, but then had to clean out the fridge for all the new food and is sore today because she just finished the task about 20 mins before session. Patient reports having relief after last visit until doing all of that today.       Pain: 5/10     Objective       Assessment  Patient care continued to focus on addressing tension of anteriolateral cervical muscles but with more focus on postural strengthening. Patient was able to begin chin tucks with little discomfort this day, also began some resistance band exercises in supine this day, and AROM shoulder flexion to address bilateral UE weakness.    Goals (to be met in 12 visits)   STG  -Patient will report 40% improvement in symptoms as evidence of subjective improvement in overall condition.     LTG  -Patient will report 70% improvement in symptoms as evidence of subjective improvement in overall condition.  -Patient will increase cervical flex AROM to 40 degrees for efficient and pain-free visual survey of their environment during all activities.  -Patient will increase cervical extension AROM to 40 degrees for efficient and pain-free visual survey of their environment during all activities.  -Patient will increase cervical rotation B AROM to 55 degrees for efficient and pain-free visual survey of their environment during all activities.  -Patient will increase strength grade of all neck and UE muscle groups to 5/5 to increase resilience to physical stressors and improve performance of  activities such as picking up objects or reaching overhead.  -Patient will be able to overhead press 10# to be able to complete all lifting and reaching during household tasks.  -Patient will achieve a score on the Neck Disability Index of 20% as evidence of improved functional capacity during everyday activity.  -Patient will demonstrate and/or verbalize competence in performance of advanced HEP to be able to progress exercise on their own following discharge from physical therapy.     Plan  Continue AROM shoulder strengthening in all planes of motion, as well as postural strengthening    Treatment Last 4 Visits  Treatment Day: 5       7/3/2025 7/8/2025 7/15/2025 7/17/2025   Spine Treatment   Therapeutic Exercise -UBE UE L2 5 min   -chin tuck + head lift [id number]  -prone shoulder I palms up 2x10  -prone shoulder I palms down 1x10     -sidelying cervcial lateral flexion iso holds [id number] B  -supine chin tuck head lift iso [id number]  -prone shoulder I palms down 2x10  -prone shoulder I palms up 1x10  -prone shoulder T 2x10  -unilateral overhead press 1x20x2# B   Seated Scapular Retraction 2 x 10 3\" H  Supine Elastic Band Pull Apart 2 x 10; Yellow   Supine Shoulder Ext with Contra Stab @ 90 Deg x 10 ea R/L; Yellow   Seated AROM (B) Shoulder Flexion x 10 0#   Neuro Re-Education *Neuromuscular facilitation - 15 min*; multiple reps performed for each  The patient's ability to perform a quality isometric contraction against manual resistance with specific muscles was assessed. Patient was passively placed in testing positions to prevent compensatory muscle use to attain starting position. The muscles listed below were found to be inhibited. PT provided brief, targeted palpation of the distal and proximal muscle belly w/ deep pressure to stimulate the muscle with mechanical/sensory input to increase neuromuscular junction activity and improve contraction quality. PT performed repetitive re-testing of isometric contraction  quality specific to targeted muscle after palpation until the desired effect was observed.    Neck  -B sternocleidomastoid  -B scalenes  -B multifidus cervicus  -B cervical flexors (longus colli and longus capitis)  -B cervical extensors  -B cervical rotators    ----------------------------------------------------------------------------------------------------------------------------------------------------------------         Supine Chin Tucks x 10 5\" H  Supine Scapular Depression x 10 5\" H    Seated Scapular Retraction x 10 5\" H    Manual Therapy -STM cervical paraspinals and upper traps 10 min -STM thoracic and cervcial paraspinals, R parascapular and RTC muscles 15 min STM (Supine): Subocc, UT, SCM, Scalenes   Cervical Distraction 3 x 1 min (bias of anterior neck)  Gentle Cross Friction Massage (Supine): Incision Site x 5 min  STM (Prone): poor tolerance even with gentle pressure of periscap   Cupping (Prone): Periscap (B) STM (Supine): Subocc, UT, SCM, Scalenes   Cervical Distraction 3 x 1 min (bias of anterior neck)  Gentle Cross Friction Massage (Supine): Incision Site x 5 min   Therapeutic Activity   Shoulder Ext 2 x 10; Green   Low Rows 2 x 10 ; Green  Education of mind/muscle connection x 3 min       Therapeutic Exercise Minutes 13 23 5 14   Neuro Re-Educ Minutes 15   14   Manual Therapy Minutes 10 15 23 12   Therapeutic Activity Minutes   10    Total Time Of Timed Procedures 38 38 38 40   Total Time Of Service-Based Procedures 0 0 0 0   Total Treatment Time 38 38 38 40   [facility]   Access Code: [email or web address]  URL: [email or web address]  Date: 07/01/2025  Prepared by: Mack Boilesen     Exercises  - Standing Cervical Sidebending AROM  - 5 x daily - 7 x weekly - 3 sets - 10 reps  - Supine Cervical Rotation AROM on Pillow  - 5 x daily - 7 x weekly - 3 sets - 10 reps  - Standing Cervical Extension AROM  - 5 x daily - 7 x weekly - 3 sets - 10 reps  - Standing Cervical Flexion AROM  - 5 x daily - 7 x  weekly - 3 sets - 10 reps  - Seated Isometric Cervical Sidebending  - 5 x daily - 7 x weekly - 3 sets - 10 reps - 10 hold  - Standing Isometric Cervical Flexion with Manual Resistance  - 5 x daily - 7 x weekly - 5 reps - 10 hold  - Standing Isometric Cervical Extension with Manual Resistance  - 5 x daily - 7 x weekly - 5 reps - 10 hold  - Seated Isometric Cervical Rotation  - 5 x daily - 7 x weekly - 5 reps - 10 hold  - Shoulder External Rotation and Scapular Retraction with Resistance  - 1 x daily - 7 x weekly - 3 sets - 10 reps  - Standing Shoulder Row with Anchored Resistance  - 1 x daily - 7 x weekly - 3 sets - 10 reps Access Code: [email or web address]  URL: [email or web address]  Date: 07/01/2025  Prepared by: Mack Dale     Exercises  - Standing Cervical Sidebending AROM  - 5 x daily - 7 x weekly - 3 sets - 10 reps  - Supine Cervical Rotation AROM on Pillow  - 5 x daily - 7 x weekly - 3 sets - 10 reps  - Standing Cervical Extension AROM  - 5 x daily - 7 x weekly - 3 sets - 10 reps  - Standing Cervical Flexion AROM  - 5 x daily - 7 x weekly - 3 sets - 10 reps  - Seated Isometric Cervical Sidebending  - 5 x daily - 7 x weekly - 3 sets - 10 reps - 10 hold  - Standing Isometric Cervical Flexion with Manual Resistance  - 5 x daily - 7 x weekly - 5 reps - 10 hold  - Standing Isometric Cervical Extension with Manual Resistance  - 5 x daily - 7 x weekly - 5 reps - 10 hold  - Seated Isometric Cervical Rotation  - 5 x daily - 7 x weekly - 5 reps - 10 hold  - Shoulder External Rotation and Scapular Retraction with Resistance  - 1 x daily - 7 x weekly - 3 sets - 10 reps  - Standing Shoulder Row with Anchored Resistance  - 1 x daily - 7 x weekly - 3 sets - 10 reps        [facility]  Access Code: [email or web address]  URL: [email or web address]  Date: 07/01/2025  Prepared by: Mack Dale     Exercises  - Standing Cervical Sidebending AROM  - 5 x daily - 7 x weekly - 3 sets - 10 reps  - Supine Cervical Rotation  AROM on Pillow  - 5 x daily - 7 x weekly - 3 sets - 10 reps  - Standing Cervical Extension AROM  - 5 x daily - 7 x weekly - 3 sets - 10 reps  - Standing Cervical Flexion AROM  - 5 x daily - 7 x weekly - 3 sets - 10 reps  - Seated Isometric Cervical Sidebending  - 5 x daily - 7 x weekly - 3 sets - 10 reps - 10 hold  - Standing Isometric Cervical Flexion with Manual Resistance  - 5 x daily - 7 x weekly - 5 reps - 10 hold  - Standing Isometric Cervical Extension with Manual Resistance  - 5 x daily - 7 x weekly - 5 reps - 10 hold  - Seated Isometric Cervical Rotation  - 5 x daily - 7 x weekly - 5 reps - 10 hold  - Shoulder External Rotation and Scapular Retraction with Resistance  - 1 x daily - 7 x weekly - 3 sets - 10 reps  - Standing Shoulder Row with Anchored Resistance  - 1 x daily - 7 x weekly - 3 sets - 10 reps    Charges  Neuro x 1, Ther Ex x 1, Manual x 1

## 2025-07-18 ENCOUNTER — TELEPHONE (OUTPATIENT)
Dept: SURGERY | Facility: CLINIC | Age: 65
End: 2025-07-18

## 2025-07-18 NOTE — TELEPHONE ENCOUNTER
Received patient reminder to see if patient had completed or scheduled her MRI brain before her appt with Dr. Reed on 8/14/25. Patient was not aware she needed the MRI, she will call to schedule today.

## 2025-07-22 ENCOUNTER — OFFICE VISIT (OUTPATIENT)
Dept: PHYSICAL THERAPY | Age: 65
End: 2025-07-22
Payer: MEDICARE

## 2025-07-22 ENCOUNTER — APPOINTMENT (OUTPATIENT)
Dept: PHYSICAL THERAPY | Age: 65
End: 2025-07-22
Payer: MEDICARE

## 2025-07-22 PROCEDURE — 97140 MANUAL THERAPY 1/> REGIONS: CPT

## 2025-07-22 PROCEDURE — 97110 THERAPEUTIC EXERCISES: CPT

## 2025-07-22 NOTE — PROGRESS NOTES
Patient: Юлия Charles (65 year old, female) Referring Provider:  Insurance:   Diagnosis: S/P cervical spinal fusion (Z98.1) [facility] Del[facility] Patterson  MEDICARE   Date of Surgery: 5/29/25 Next MD visit:  KAL COLE   Precautions:  None No data recorded Referral Information:    Date of Evaluation: Req: 1, Auth: 1, Exp: 6/4/2026 07/01/25 POC Auth Visits:          Today's Date   7/22/2025    Subjective  Patient states she felt well enough to vacuum and do some yardwork the past few days. She has been trying to use her L arm more as it still feels weaker than than R.       Pain: 3/10     Objective          See treatment below     Assessment  Patient becoming more tolerant of cervical motion, showing increase in rotation mobility. Strength is improving in LUE. Focus of today's treatment was to improve cervical mobility and loading tolerance of upper quarter.    Clinical judgement and ongoing assessment were used to adjust intervention based on patient's response. Skilled PT guided patient through one-on-one treatment providing verbal, visual, tactile cues, guided exercise selection (type, volume, intensity, complexity), and appropriate rest breaks to maximize treatment effect. Treatment time includes time to complete interventions as well as time spent demonstrating proper technique and educating on effect/purpose of each intervention. The patient requires continued physical therapy treatment to achieve the current established goals. Answered all patient questions throughout session.        Goals (to be met in 12 visits)   STG  -Patient will report 40% improvement in symptoms as evidence of subjective improvement in overall condition.     LTG  -Patient will report 70% improvement in symptoms as evidence of subjective improvement in overall condition.  -Patient will increase cervical flex AROM to 40 degrees for efficient and pain-free visual survey of their environment during all activities.  -Patient will increase cervical  extension AROM to 40 degrees for efficient and pain-free visual survey of their environment during all activities.  -Patient will increase cervical rotation B AROM to 55 degrees for efficient and pain-free visual survey of their environment during all activities.  -Patient will increase strength grade of all neck and UE muscle groups to 5/5 to increase resilience to physical stressors and improve performance of activities such as picking up objects or reaching overhead.  -Patient will be able to overhead press 10# to be able to complete all lifting and reaching during household tasks.  -Patient will achieve a score on the Neck Disability Index of 20% as evidence of improved functional capacity during everyday activity.  -Patient will demonstrate and/or verbalize competence in performance of advanced HEP to be able to progress exercise on their own following discharge from physical therapy.             Plan  Continue AROM shoulder strengthening in all planes of motion, as well as postural strengthening. Gentle isometrics and neck motion exercise to improve cervical mobility.    Treatment Last 4 Visits  Treatment Day: 6       7/8/2025 7/15/2025 7/17/2025 7/22/2025   Spine Treatment   Therapeutic Exercise -sidelying cervcial lateral flexion iso holds [id number] B  -supine chin tuck head lift iso [id number]  -prone shoulder I palms down 2x10  -prone shoulder I palms up 1x10  -prone shoulder T 2x10  -unilateral overhead press 1x20x2# B   Seated Scapular Retraction 2 x 10 3\" H  Supine Elastic Band Pull Apart 2 x 10; Yellow   Supine Shoulder Ext with Contra Stab @ 90 Deg x 10 ea R/L; Yellow   Seated AROM (B) Shoulder Flexion x 10 0# -UBE UE only L3 4 min  -prone cervical retraction 2x10  -supine RedTB pull-apart x20 (cue for scap depression  -manual resisted cervical lateral flexion L/R isometrics at end ROM lateral flexion B (gentle) [id number] B  -manual resisted cervical rotation L/R isometrics at end ROM rotation B, small R SB  during L rotation positioining [id number] B  -unilateral overhead press LUE only 1x1x(3,4,5,6,7), 3x5x4#   Neuro Re-Education   Supine Chin Tucks x 10 5\" H  Supine Scapular Depression x 10 5\" H    Seated Scapular Retraction x 10 5\" H     Manual Therapy -STM thoracic and cervcial paraspinals, R parascapular and RTC muscles 15 min STM (Supine): Subocc, UT, SCM, Scalenes   Cervical Distraction 3 x 1 min (bias of anterior neck)  Gentle Cross Friction Massage (Supine): Incision Site x 5 min  STM (Prone): poor tolerance even with gentle pressure of periscap   Cupping (Prone): Periscap (B) STM (Supine): Subocc, UT, SCM, Scalenes   Cervical Distraction 3 x 1 min (bias of anterior neck)  Gentle Cross Friction Massage (Supine): Incision Site x 5 min -supine B C1-C3 rotation mobilizations grade III 5 min total  -STM upper traps, cervical paraspinals, parascap mm, posterior RTC for pain allevation 12 min   -prone UPA T2-T8 (B) grade III 5 min total   Therapeutic Activity  Shoulder Ext 2 x 10; Green   Low Rows 2 x 10 ; Green  Education of mind/muscle connection x 3 min        Therapeutic Exercise Minutes 23 5 14    Neuro Re-Educ Minutes   14    Manual Therapy Minutes 15 23 12    Therapeutic Activity Minutes  10     Total Time Of Timed Procedures 38 38 40 0   Total Time Of Service-Based Procedures 0 0 0 0   Total Treatment Time 38 38 40 0   HEP  Access Code: [email or web address]  URL: [email or web address]  Date: 07/01/2025  Prepared by: Mack Dale     Exercises  - Standing Cervical Sidebending AROM  - 5 x daily - 7 x weekly - 3 sets - 10 reps  - Supine Cervical Rotation AROM on Pillow  - 5 x daily - 7 x weekly - 3 sets - 10 reps  - Standing Cervical Extension AROM  - 5 x daily - 7 x weekly - 3 sets - 10 reps  - Standing Cervical Flexion AROM  - 5 x daily - 7 x weekly - 3 sets - 10 reps  - Seated Isometric Cervical Sidebending  - 5 x daily - 7 x weekly - 3 sets - 10 reps - 10 hold  - Standing Isometric Cervical Flexion  with Manual Resistance  - 5 x daily - 7 x weekly - 5 reps - 10 hold  - Standing Isometric Cervical Extension with Manual Resistance  - 5 x daily - 7 x weekly - 5 reps - 10 hold  - Seated Isometric Cervical Rotation  - 5 x daily - 7 x weekly - 5 reps - 10 hold  - Shoulder External Rotation and Scapular Retraction with Resistance  - 1 x daily - 7 x weekly - 3 sets - 10 reps  - Standing Shoulder Row with Anchored Resistance  - 1 x daily - 7 x weekly - 3 sets - 10 reps Access Code: [email or web address]  URL: [email or web address]  Date: 07/01/2025  Prepared by: Mack Dale     Exercises  - Standing Cervical Sidebending AROM  - 5 x daily - 7 x weekly - 3 sets - 10 reps  - Supine Cervical Rotation AROM on Pillow  - 5 x daily - 7 x weekly - 3 sets - 10 reps  - Standing Cervical Extension AROM  - 5 x daily - 7 x weekly - 3 sets - 10 reps  - Standing Cervical Flexion AROM  - 5 x daily - 7 x weekly - 3 sets - 10 reps  - Seated Isometric Cervical Sidebending  - 5 x daily - 7 x weekly - 3 sets - 10 reps - 10 hold  - Standing Isometric Cervical Flexion with Manual Resistance  - 5 x daily - 7 x weekly - 5 reps - 10 hold  - Standing Isometric Cervical Extension with Manual Resistance  - 5 x daily - 7 x weekly - 5 reps - 10 hold  - Seated Isometric Cervical Rotation  - 5 x daily - 7 x weekly - 5 reps - 10 hold  - Shoulder External Rotation and Scapular Retraction with Resistance  - 1 x daily - 7 x weekly - 3 sets - 10 reps  - Standing Shoulder Row with Anchored Resistance  - 1 x daily - 7 x weekly - 3 sets - 10 reps         HEP  Access Code: [email or web address]  URL: [email or web address]  Date: 07/01/2025  Prepared by: Mack Delarosaen     Exercises  - Standing Cervical Sidebending AROM  - 5 x daily - 7 x weekly - 3 sets - 10 reps  - Supine Cervical Rotation AROM on Pillow  - 5 x daily - 7 x weekly - 3 sets - 10 reps  - Standing Cervical Extension AROM  - 5 x daily - 7 x weekly - 3 sets - 10 reps  - Standing Cervical  Flexion AROM  - 5 x daily - 7 x weekly - 3 sets - 10 reps  - Seated Isometric Cervical Sidebending  - 5 x daily - 7 x weekly - 3 sets - 10 reps - 10 hold  - Standing Isometric Cervical Flexion with Manual Resistance  - 5 x daily - 7 x weekly - 5 reps - 10 hold  - Standing Isometric Cervical Extension with Manual Resistance  - 5 x daily - 7 x weekly - 5 reps - 10 hold  - Seated Isometric Cervical Rotation  - 5 x daily - 7 x weekly - 5 reps - 10 hold  - Shoulder External Rotation and Scapular Retraction with Resistance  - 1 x daily - 7 x weekly - 3 sets - 10 reps  - Standing Shoulder Row with Anchored Resistance  - 1 x daily - 7 x weekly - 3 sets - 10 reps    Charges  2MT, 1TE

## 2025-07-24 ENCOUNTER — OFFICE VISIT (OUTPATIENT)
Dept: PHYSICAL THERAPY | Age: 65
End: 2025-07-24
Payer: MEDICARE

## 2025-07-24 PROCEDURE — 97110 THERAPEUTIC EXERCISES: CPT

## 2025-07-24 PROCEDURE — 97140 MANUAL THERAPY 1/> REGIONS: CPT

## 2025-07-24 PROCEDURE — 97112 NEUROMUSCULAR REEDUCATION: CPT

## 2025-07-24 NOTE — PROGRESS NOTES
Patient: Юлия Charles (65 year old, female) Referring Provider:  Insurance:   Diagnosis: S/P cervical spinal fusion (Z98.1) [facility] Del[facility] Patterson  MEDICARE   Date of Surgery: 5/29/25 Next MD visit:  KAL COLE   Precautions:  None No data recorded Referral Information:    Date of Evaluation: Req: 0, Auth: 0, Exp:     07/01/25 POC Auth Visits:          Today's Date   7/24/2025    Subjective  Patient reports feeling a little stiff today, but she was feeling very good after last visit.       Pain: 2/10     Objective          See treatment below     Assessment  Treatment focus on scapular coordination and endurance training as well as thoracic mobility today. Patient was considerably challenged by open book rotation as well as all prone exercise. At end of session, she felt fatigued but otherwise well. Shoulder strength on L improving.      Clinical judgement and ongoing assessment were used to adjust intervention based on patient's response. Skilled PT guided patient through one-on-one treatment providing verbal, visual, tactile cues, guided exercise selection (type, volume, intensity, complexity), and appropriate rest breaks to maximize treatment effect. Treatment time includes time to complete interventions as well as time spent demonstrating proper technique and educating on effect/purpose of each intervention. The patient requires continued physical therapy treatment to achieve the current established goals. Answered all patient questions throughout session.      Goals (to be met in 12 visits)   STG  -Patient will report 40% improvement in symptoms as evidence of subjective improvement in overall condition.     LTG  -Patient will report 70% improvement in symptoms as evidence of subjective improvement in overall condition.  -Patient will increase cervical flex AROM to 40 degrees for efficient and pain-free visual survey of their environment during all activities.  -Patient will increase cervical extension AROM to  40 degrees for efficient and pain-free visual survey of their environment during all activities.  -Patient will increase cervical rotation B AROM to 55 degrees for efficient and pain-free visual survey of their environment during all activities.  -Patient will increase strength grade of all neck and UE muscle groups to 5/5 to increase resilience to physical stressors and improve performance of activities such as picking up objects or reaching overhead.  -Patient will be able to overhead press 10# to be able to complete all lifting and reaching during household tasks.  -Patient will achieve a score on the Neck Disability Index of 20% as evidence of improved functional capacity during everyday activity.  -Patient will demonstrate and/or verbalize competence in performance of advanced HEP to be able to progress exercise on their own following discharge from physical therapy.                 Plan  Continue AROM shoulder strengthening in all planes of motion, as well as postural strengthening. Gentle isometrics and neck motion exercise to improve cervical mobility. Include thoracic mobility drills to reduce reliance on cervical region motion.    Treatment Last 4 Visits  Treatment Day: 7       7/15/2025 7/17/2025 7/22/2025 7/24/2025   Spine Treatment   Therapeutic Exercise Seated Scapular Retraction 2 x 10 3\" H  Supine Elastic Band Pull Apart 2 x 10; Yellow   Supine Shoulder Ext with Contra Stab @ 90 Deg x 10 ea R/L; Yellow   Seated AROM (B) Shoulder Flexion x 10 0# -UBE UE only L3 4 min  -prone cervical retraction 2x10  -supine RedTB pull-apart x20 (cue for scap depression  -manual resisted cervical lateral flexion L/R isometrics at end ROM lateral flexion B (gentle) [id number] B  -manual resisted cervical rotation L/R isometrics at end ROM rotation B, small R SB during L rotation positioining 39g9jql B  -unilateral overhead press LUE only 1x1x(3,4,5,6,7), 3x5x4# -UBE UE only L4 5 min  -open book rotation lumbar lock  [id number] B  -LUE overhead press 3x6x4#   Neuro Re-Education  Supine Chin Tucks x 10 5\" H  Supine Scapular Depression x 10 5\" H    Seated Scapular Retraction x 10 5\" H   -prone scap retraction and depression [id number]  -prone scap retraction and depression + slight B sh ext 5y37p0xne  -prone shoulder W + scap retract and depress (continuous cueing) [id number]   Manual Therapy STM (Supine): Subocc, UT, SCM, Scalenes   Cervical Distraction 3 x 1 min (bias of anterior neck)  Gentle Cross Friction Massage (Supine): Incision Site x 5 min  STM (Prone): poor tolerance even with gentle pressure of periscap   Cupping (Prone): Periscap (B) STM (Supine): Subocc, UT, SCM, Scalenes   Cervical Distraction 3 x 1 min (bias of anterior neck)  Gentle Cross Friction Massage (Supine): Incision Site x 5 min -supine B C1-C3 rotation mobilizations grade III 5 min total  -STM upper traps, cervical paraspinals, parascap mm, posterior RTC for pain allevation 12 min   -prone UPA T2-T8 (B) grade III 5 min total -STM upper and mid traps, cervcial paraspinals, B scalenes and SCM 15 min   Therapeutic Activity Shoulder Ext 2 x 10; Green   Low Rows 2 x 10 ; Green  Education of mind/muscle connection x 3 min         Therapeutic Exercise Minutes 5 14  8   Neuro Re-Educ Minutes  14  15   Manual Therapy Minutes 23 12  15   Therapeutic Activity Minutes 10      Total Time Of Timed Procedures 38 40 0 38   Total Time Of Service-Based Procedures 0 0 0 0   Total Treatment Time 38 40 0 38   [facility] Access Code: [email or web address]  URL: [email or web address]  Date: 07/01/2025  Prepared by: Mack Dale     Exercises  - Standing Cervical Sidebending AROM  - 5 x daily - 7 x weekly - 3 sets - 10 reps  - Supine Cervical Rotation AROM on Pillow  - 5 x daily - 7 x weekly - 3 sets - 10 reps  - Standing Cervical Extension AROM  - 5 x daily - 7 x weekly - 3 sets - 10 reps  - Standing Cervical Flexion AROM  - 5 x daily - 7 x weekly - 3 sets - 10 reps  - Seated  Isometric Cervical Sidebending  - 5 x daily - 7 x weekly - 3 sets - 10 reps - 10 hold  - Standing Isometric Cervical Flexion with Manual Resistance  - 5 x daily - 7 x weekly - 5 reps - 10 hold  - Standing Isometric Cervical Extension with Manual Resistance  - 5 x daily - 7 x weekly - 5 reps - 10 hold  - Seated Isometric Cervical Rotation  - 5 x daily - 7 x weekly - 5 reps - 10 hold  - Shoulder External Rotation and Scapular Retraction with Resistance  - 1 x daily - 7 x weekly - 3 sets - 10 reps  - Standing Shoulder Row with Anchored Resistance  - 1 x daily - 7 x weekly - 3 sets - 10 reps Access Code: [email or web address]  URL: [email or web address]  Date: 07/01/2025  Prepared by: Mack Dale     Exercises  - Standing Cervical Sidebending AROM  - 5 x daily - 7 x weekly - 3 sets - 10 reps  - Supine Cervical Rotation AROM on Pillow  - 5 x daily - 7 x weekly - 3 sets - 10 reps  - Standing Cervical Extension AROM  - 5 x daily - 7 x weekly - 3 sets - 10 reps  - Standing Cervical Flexion AROM  - 5 x daily - 7 x weekly - 3 sets - 10 reps  - Seated Isometric Cervical Sidebending  - 5 x daily - 7 x weekly - 3 sets - 10 reps - 10 hold  - Standing Isometric Cervical Flexion with Manual Resistance  - 5 x daily - 7 x weekly - 5 reps - 10 hold  - Standing Isometric Cervical Extension with Manual Resistance  - 5 x daily - 7 x weekly - 5 reps - 10 hold  - Seated Isometric Cervical Rotation  - 5 x daily - 7 x weekly - 5 reps - 10 hold  - Shoulder External Rotation and Scapular Retraction with Resistance  - 1 x daily - 7 x weekly - 3 sets - 10 reps  - Standing Shoulder Row with Anchored Resistance  - 1 x daily - 7 x weekly - 3 sets - 10 reps          HEP  Access Code: [email or web address]  URL: [email or web address]  Date: 07/01/2025  Prepared by: Makc Delarosaen     Exercises  - Standing Cervical Sidebending AROM  - 5 x daily - 7 x weekly - 3 sets - 10 reps  - Supine Cervical Rotation AROM on Pillow  - 5 x daily - 7 x  weekly - 3 sets - 10 reps  - Standing Cervical Extension AROM  - 5 x daily - 7 x weekly - 3 sets - 10 reps  - Standing Cervical Flexion AROM  - 5 x daily - 7 x weekly - 3 sets - 10 reps  - Seated Isometric Cervical Sidebending  - 5 x daily - 7 x weekly - 3 sets - 10 reps - 10 hold  - Standing Isometric Cervical Flexion with Manual Resistance  - 5 x daily - 7 x weekly - 5 reps - 10 hold  - Standing Isometric Cervical Extension with Manual Resistance  - 5 x daily - 7 x weekly - 5 reps - 10 hold  - Seated Isometric Cervical Rotation  - 5 x daily - 7 x weekly - 5 reps - 10 hold  - Shoulder External Rotation and Scapular Retraction with Resistance  - 1 x daily - 7 x weekly - 3 sets - 10 reps  - Standing Shoulder Row with Anchored Resistance  - 1 x daily - 7 x weekly - 3 sets - 10 reps    Charges  1 MT, 1NMR, 1TE

## 2025-07-28 ENCOUNTER — TELEPHONE (OUTPATIENT)
Dept: PHYSICAL THERAPY | Age: 65
End: 2025-07-28

## 2025-07-29 ENCOUNTER — OFFICE VISIT (OUTPATIENT)
Dept: NEUROLOGY | Facility: CLINIC | Age: 65
End: 2025-07-29
Payer: MEDICARE

## 2025-07-29 ENCOUNTER — OFFICE VISIT (OUTPATIENT)
Dept: PHYSICAL THERAPY | Age: 65
End: 2025-07-29
Payer: MEDICARE

## 2025-07-29 ENCOUNTER — APPOINTMENT (OUTPATIENT)
Dept: PHYSICAL THERAPY | Age: 65
End: 2025-07-29
Payer: MEDICARE

## 2025-07-29 VITALS
RESPIRATION RATE: 16 BRPM | WEIGHT: 148 LBS | SYSTOLIC BLOOD PRESSURE: 130 MMHG | DIASTOLIC BLOOD PRESSURE: 90 MMHG | HEART RATE: 92 BPM | OXYGEN SATURATION: 98 % | BODY MASS INDEX: 27.23 KG/M2 | HEIGHT: 62 IN

## 2025-07-29 DIAGNOSIS — G96.810 INTRACRANIAL HYPOTENSION: Primary | ICD-10-CM

## 2025-07-29 DIAGNOSIS — R41.3 MEMORY LOSS: ICD-10-CM

## 2025-07-29 DIAGNOSIS — R51.9 CHRONIC DAILY HEADACHE: ICD-10-CM

## 2025-07-29 PROCEDURE — 97140 MANUAL THERAPY 1/> REGIONS: CPT

## 2025-07-29 PROCEDURE — 97110 THERAPEUTIC EXERCISES: CPT

## 2025-07-29 PROCEDURE — G2211 COMPLEX E/M VISIT ADD ON: HCPCS | Performed by: OTHER

## 2025-07-29 PROCEDURE — 97530 THERAPEUTIC ACTIVITIES: CPT

## 2025-07-29 PROCEDURE — 99214 OFFICE O/P EST MOD 30 MIN: CPT | Performed by: OTHER

## 2025-07-31 ENCOUNTER — OFFICE VISIT (OUTPATIENT)
Dept: FAMILY MEDICINE CLINIC | Facility: CLINIC | Age: 65
End: 2025-07-31
Payer: MEDICARE

## 2025-07-31 ENCOUNTER — OFFICE VISIT (OUTPATIENT)
Dept: PHYSICAL THERAPY | Age: 65
End: 2025-07-31
Payer: MEDICARE

## 2025-07-31 VITALS
HEART RATE: 92 BPM | SYSTOLIC BLOOD PRESSURE: 146 MMHG | WEIGHT: 148.63 LBS | DIASTOLIC BLOOD PRESSURE: 86 MMHG | BODY MASS INDEX: 27.35 KG/M2 | RESPIRATION RATE: 20 BRPM | TEMPERATURE: 98 F | HEIGHT: 62 IN | OXYGEN SATURATION: 99 %

## 2025-07-31 DIAGNOSIS — R51.9 ACUTE NONINTRACTABLE HEADACHE, UNSPECIFIED HEADACHE TYPE: Primary | ICD-10-CM

## 2025-07-31 DIAGNOSIS — R42 DIZZINESS: ICD-10-CM

## 2025-07-31 DIAGNOSIS — G96.810 INTRACRANIAL HYPOTENSION: ICD-10-CM

## 2025-07-31 PROCEDURE — 97140 MANUAL THERAPY 1/> REGIONS: CPT

## 2025-07-31 PROCEDURE — 97110 THERAPEUTIC EXERCISES: CPT

## 2025-07-31 PROCEDURE — 97112 NEUROMUSCULAR REEDUCATION: CPT

## 2025-08-05 ENCOUNTER — OFFICE VISIT (OUTPATIENT)
Dept: PHYSICAL THERAPY | Age: 65
End: 2025-08-05

## 2025-08-05 PROCEDURE — 97110 THERAPEUTIC EXERCISES: CPT

## 2025-08-05 PROCEDURE — 97530 THERAPEUTIC ACTIVITIES: CPT

## 2025-08-05 PROCEDURE — 97112 NEUROMUSCULAR REEDUCATION: CPT

## 2025-08-07 ENCOUNTER — OFFICE VISIT (OUTPATIENT)
Dept: PHYSICAL THERAPY | Age: 65
End: 2025-08-07

## 2025-08-07 PROCEDURE — 97110 THERAPEUTIC EXERCISES: CPT

## 2025-08-13 ENCOUNTER — HOSPITAL ENCOUNTER (OUTPATIENT)
Dept: MRI IMAGING | Facility: HOSPITAL | Age: 65
Discharge: HOME | End: 2025-08-13
Attending: PHYSICIAN ASSISTANT

## 2025-08-13 DIAGNOSIS — G96.810 INTRACRANIAL HYPOTENSION: ICD-10-CM

## 2025-08-13 PROCEDURE — A9575 INJ GADOTERATE MEGLUMI 0.1ML: HCPCS | Performed by: PHYSICIAN ASSISTANT

## 2025-08-13 PROCEDURE — 70553 MRI BRAIN STEM W/O & W/DYE: CPT | Performed by: PHYSICIAN ASSISTANT

## 2025-08-13 RX ORDER — GADOTERATE MEGLUMINE 376.9 MG/ML
15 INJECTION INTRAVENOUS
Status: COMPLETED | OUTPATIENT
Start: 2025-08-13 | End: 2025-08-13

## 2025-08-13 RX ADMIN — GADOTERATE MEGLUMINE 14 ML: 376.9 INJECTION INTRAVENOUS at 16:19:00

## 2025-08-14 ENCOUNTER — TELEPHONE (OUTPATIENT)
Dept: SURGERY | Facility: CLINIC | Age: 65
End: 2025-08-14

## 2025-08-18 ENCOUNTER — OFFICE VISIT (OUTPATIENT)
Dept: SURGERY | Facility: CLINIC | Age: 65
End: 2025-08-18

## 2025-08-18 VITALS
HEART RATE: 94 BPM | HEIGHT: 62 IN | OXYGEN SATURATION: 100 % | DIASTOLIC BLOOD PRESSURE: 80 MMHG | SYSTOLIC BLOOD PRESSURE: 128 MMHG | WEIGHT: 148 LBS | BODY MASS INDEX: 27.23 KG/M2

## 2025-08-18 DIAGNOSIS — Z98.1 S/P CERVICAL SPINAL FUSION: Primary | ICD-10-CM

## 2025-08-18 PROCEDURE — 99024 POSTOP FOLLOW-UP VISIT: CPT

## 2025-08-21 ENCOUNTER — OFFICE VISIT (OUTPATIENT)
Dept: SURGERY | Facility: CLINIC | Age: 65
End: 2025-08-21

## 2025-08-21 VITALS
DIASTOLIC BLOOD PRESSURE: 74 MMHG | BODY MASS INDEX: 27.23 KG/M2 | HEART RATE: 96 BPM | WEIGHT: 148 LBS | HEIGHT: 62 IN | SYSTOLIC BLOOD PRESSURE: 126 MMHG | OXYGEN SATURATION: 99 %

## 2025-08-21 DIAGNOSIS — G96.810 INTRACRANIAL HYPOTENSION: Primary | ICD-10-CM

## 2025-08-21 PROCEDURE — 99212 OFFICE O/P EST SF 10 MIN: CPT | Performed by: NEUROLOGICAL SURGERY

## 2025-08-22 ENCOUNTER — HOSPITAL ENCOUNTER (OUTPATIENT)
Dept: GENERAL RADIOLOGY | Age: 65
Discharge: HOME | End: 2025-08-22

## 2025-08-22 ENCOUNTER — LAB ENCOUNTER (OUTPATIENT)
Dept: LAB | Age: 65
End: 2025-08-22

## 2025-08-22 ENCOUNTER — OFFICE VISIT (OUTPATIENT)
Dept: FAMILY MEDICINE CLINIC | Facility: CLINIC | Age: 65
End: 2025-08-22

## 2025-08-22 ENCOUNTER — RESULTS FOLLOW-UP (OUTPATIENT)
Dept: FAMILY MEDICINE CLINIC | Facility: CLINIC | Age: 65
End: 2025-08-22

## 2025-08-22 ENCOUNTER — TELEPHONE (OUTPATIENT)
Dept: NEUROLOGY | Facility: CLINIC | Age: 65
End: 2025-08-22

## 2025-08-22 ENCOUNTER — TELEPHONE (OUTPATIENT)
Dept: FAMILY MEDICINE CLINIC | Facility: CLINIC | Age: 65
End: 2025-08-22

## 2025-08-22 VITALS
HEART RATE: 88 BPM | DIASTOLIC BLOOD PRESSURE: 80 MMHG | BODY MASS INDEX: 27.81 KG/M2 | TEMPERATURE: 98 F | WEIGHT: 151.13 LBS | RESPIRATION RATE: 16 BRPM | OXYGEN SATURATION: 100 % | SYSTOLIC BLOOD PRESSURE: 132 MMHG | HEIGHT: 62 IN

## 2025-08-22 DIAGNOSIS — Z98.1 S/P CERVICAL SPINAL FUSION: ICD-10-CM

## 2025-08-22 DIAGNOSIS — Z00.00 MEDICARE ANNUAL WELLNESS VISIT, SUBSEQUENT: Primary | ICD-10-CM

## 2025-08-22 DIAGNOSIS — R73.9 BLOOD GLUCOSE ELEVATED: ICD-10-CM

## 2025-08-22 DIAGNOSIS — G96.810 INTRACRANIAL HYPOTENSION: ICD-10-CM

## 2025-08-22 DIAGNOSIS — Z71.89 COUNSELING REGARDING ADVANCE DIRECTIVES AND GOALS OF CARE: ICD-10-CM

## 2025-08-22 DIAGNOSIS — Z86.011 HISTORY OF BENIGN BRAIN TUMOR: ICD-10-CM

## 2025-08-22 DIAGNOSIS — Z85.3 HISTORY OF BREAST CANCER: ICD-10-CM

## 2025-08-22 DIAGNOSIS — G89.29 CHRONIC NONINTRACTABLE HEADACHE, UNSPECIFIED HEADACHE TYPE: ICD-10-CM

## 2025-08-22 DIAGNOSIS — E83.51 LOW CALCIUM LEVELS: ICD-10-CM

## 2025-08-22 DIAGNOSIS — K57.30 DIVERTICULOSIS OF LARGE INTESTINE WITHOUT HEMORRHAGE: ICD-10-CM

## 2025-08-22 DIAGNOSIS — J44.9 CHRONIC OBSTRUCTIVE PULMONARY DISEASE, UNSPECIFIED COPD TYPE (HCC): ICD-10-CM

## 2025-08-22 DIAGNOSIS — Z87.442 HISTORY OF KIDNEY STONES: ICD-10-CM

## 2025-08-22 DIAGNOSIS — E78.2 MIXED HYPERLIPIDEMIA: ICD-10-CM

## 2025-08-22 DIAGNOSIS — D72.829 LEUKOCYTOSIS, UNSPECIFIED TYPE: ICD-10-CM

## 2025-08-22 DIAGNOSIS — M25.562 ACUTE PAIN OF LEFT KNEE: ICD-10-CM

## 2025-08-22 DIAGNOSIS — M17.12 PRIMARY OSTEOARTHRITIS OF LEFT KNEE: Primary | ICD-10-CM

## 2025-08-22 DIAGNOSIS — E87.6 LOW BLOOD POTASSIUM: ICD-10-CM

## 2025-08-22 DIAGNOSIS — G31.84 MILD COGNITIVE IMPAIRMENT: ICD-10-CM

## 2025-08-22 DIAGNOSIS — Z90.13 S/P MASTECTOMY, BILATERAL: ICD-10-CM

## 2025-08-22 DIAGNOSIS — E66.3 OVERWEIGHT (BMI 25.0-29.9): ICD-10-CM

## 2025-08-22 DIAGNOSIS — R51.9 CHRONIC NONINTRACTABLE HEADACHE, UNSPECIFIED HEADACHE TYPE: ICD-10-CM

## 2025-08-22 PROBLEM — R19.4 CHANGE IN BOWEL HABITS: Status: RESOLVED | Noted: 2025-02-11 | Resolved: 2025-08-22

## 2025-08-22 PROBLEM — G95.9 CERVICAL MYELOPATHY WITH CERVICAL RADICULOPATHY (HCC): Status: RESOLVED | Noted: 2025-05-29 | Resolved: 2025-08-22

## 2025-08-22 PROBLEM — R19.7 DIARRHEA: Status: RESOLVED | Noted: 2025-02-11 | Resolved: 2025-08-22

## 2025-08-22 PROBLEM — U07.1 PNEUMONIA DUE TO COVID-19 VIRUS: Status: RESOLVED | Noted: 2024-08-26 | Resolved: 2025-08-22

## 2025-08-22 PROBLEM — D12.0 BENIGN NEOPLASM OF CECUM: Status: RESOLVED | Noted: 2025-02-11 | Resolved: 2025-08-22

## 2025-08-22 PROBLEM — A41.9 SEPSIS (HCC): Status: RESOLVED | Noted: 2020-05-14 | Resolved: 2025-08-22

## 2025-08-22 PROBLEM — J12.82 PNEUMONIA DUE TO COVID-19 VIRUS: Status: RESOLVED | Noted: 2024-08-26 | Resolved: 2025-08-22

## 2025-08-22 PROBLEM — Z12.11 SPECIAL SCREENING FOR MALIGNANT NEOPLASM OF COLON: Status: RESOLVED | Noted: 2025-02-11 | Resolved: 2025-08-22

## 2025-08-22 PROBLEM — J18.9 PNEUMONIA DUE TO SUSPECTED GRAM-NEGATIVE BACTERIA: Status: RESOLVED | Noted: 2024-08-25 | Resolved: 2025-08-22

## 2025-08-22 PROBLEM — M54.12 CERVICAL MYELOPATHY WITH CERVICAL RADICULOPATHY (HCC): Status: RESOLVED | Noted: 2025-05-29 | Resolved: 2025-08-22

## 2025-08-22 PROBLEM — J96.01 ACUTE RESPIRATORY FAILURE WITH HYPOXEMIA (HCC): Status: RESOLVED | Noted: 2024-08-26 | Resolved: 2025-08-22

## 2025-08-22 LAB
ALBUMIN SERPL-MCNC: 4.8 G/DL (ref 3.2–4.8)
ALBUMIN/GLOB SERPL: 1.6 (ref 1–2)
ALP LIVER SERPL-CCNC: 94 U/L (ref 55–142)
ALT SERPL-CCNC: 16 U/L (ref 10–49)
ANION GAP SERPL CALC-SCNC: 15 MMOL/L (ref 0–18)
AST SERPL-CCNC: 24 U/L (ref ?–34)
BASOPHILS # BLD AUTO: 0.07 X10(3) UL (ref 0–0.2)
BASOPHILS NFR BLD AUTO: 1 %
BILIRUB SERPL-MCNC: 0.5 MG/DL (ref 0.2–1.1)
BUN BLD-MCNC: 16 MG/DL (ref 9–23)
CALCIUM BLD-MCNC: 10.1 MG/DL (ref 8.7–10.6)
CHLORIDE SERPL-SCNC: 103 MMOL/L (ref 98–112)
CHOLEST SERPL-MCNC: 193 MG/DL (ref ?–200)
CO2 SERPL-SCNC: 26 MMOL/L (ref 21–32)
CREAT BLD-MCNC: 0.68 MG/DL (ref 0.55–1.02)
EGFRCR SERPLBLD CKD-EPI 2021: 96 ML/MIN/1.73M2 (ref 60–?)
EOSINOPHIL # BLD AUTO: 0.1 X10(3) UL (ref 0–0.7)
EOSINOPHIL NFR BLD AUTO: 1.5 %
ERYTHROCYTE [DISTWIDTH] IN BLOOD BY AUTOMATED COUNT: 13.3 %
FASTING PATIENT LIPID ANSWER: NO
FASTING STATUS PATIENT QL REPORTED: NO
GLOBULIN PLAS-MCNC: 3 G/DL (ref 2–3.5)
GLUCOSE BLD-MCNC: 97 MG/DL (ref 70–99)
HCT VFR BLD AUTO: 44 % (ref 35–48)
HDLC SERPL-MCNC: 57 MG/DL (ref 40–59)
HGB BLD-MCNC: 14.1 G/DL (ref 12–16)
IMM GRANULOCYTES # BLD AUTO: 0.02 X10(3) UL (ref 0–1)
IMM GRANULOCYTES NFR BLD: 0.3 %
LDLC SERPL CALC-MCNC: 108 MG/DL (ref ?–100)
LYMPHOCYTES # BLD AUTO: 1.7 X10(3) UL (ref 1–4)
LYMPHOCYTES NFR BLD AUTO: 25.4 %
MCH RBC QN AUTO: 30.7 PG (ref 26–34)
MCHC RBC AUTO-ENTMCNC: 32 G/DL (ref 31–37)
MCV RBC AUTO: 95.9 FL (ref 80–100)
MONOCYTES # BLD AUTO: 0.53 X10(3) UL (ref 0.1–1)
MONOCYTES NFR BLD AUTO: 7.9 %
NEUTROPHILS # BLD AUTO: 4.27 X10 (3) UL (ref 1.5–7.7)
NEUTROPHILS # BLD AUTO: 4.27 X10(3) UL (ref 1.5–7.7)
NEUTROPHILS NFR BLD AUTO: 63.9 %
NONHDLC SERPL-MCNC: 136 MG/DL (ref ?–130)
OSMOLALITY SERPL CALC.SUM OF ELEC: 299 MOSM/KG (ref 275–295)
PLATELET # BLD AUTO: 336 10(3)UL (ref 150–450)
POTASSIUM SERPL-SCNC: 4.6 MMOL/L (ref 3.5–5.1)
PROT SERPL-MCNC: 7.8 G/DL (ref 5.7–8.2)
RBC # BLD AUTO: 4.59 X10(6)UL (ref 3.8–5.3)
SODIUM SERPL-SCNC: 144 MMOL/L (ref 136–145)
TRIGL SERPL-MCNC: 164 MG/DL (ref 30–149)
VLDLC SERPL CALC-MCNC: 28 MG/DL (ref 0–30)
WBC # BLD AUTO: 6.7 X10(3) UL (ref 4–11)

## 2025-08-22 PROCEDURE — 36415 COLL VENOUS BLD VENIPUNCTURE: CPT

## 2025-08-22 PROCEDURE — 80053 COMPREHEN METABOLIC PANEL: CPT

## 2025-08-22 PROCEDURE — 80061 LIPID PANEL: CPT

## 2025-08-22 PROCEDURE — 73560 X-RAY EXAM OF KNEE 1 OR 2: CPT

## 2025-08-22 PROCEDURE — 85025 COMPLETE CBC W/AUTO DIFF WBC: CPT

## 2025-08-22 RX ORDER — ACETAMINOPHEN 500 MG
500 TABLET ORAL EVERY 6 HOURS PRN
COMMUNITY

## (undated) DEVICE — SYRINGE MED 10ML LL TIP W/O SFTY DISP

## (undated) DEVICE — SOLUTION IRRIG 3000ML 0.9% NACL FLX CONT

## (undated) DEVICE — RETRIEVAL DEPLOYMENT DEVICE: Brand: LITHOVUE EMPOWER™

## (undated) DEVICE — CATHETER URET 5FR L70CM FLX OPN TIP NONPORTED

## (undated) DEVICE — GLOVE SUR 7.5 SENSICARE PI PIP CRM PWD F

## (undated) DEVICE — NITINOL WIRE WITH HYDROPHILIC TIP: Brand: SENSOR

## (undated) DEVICE — SYRINGE MED 20ML STD CLR PLAS LL TIP N CTRL

## (undated) DEVICE — SLEEVE COMPR MD KNEE LEN SGL USE KENDALL SCD

## (undated) DEVICE — 3.0MM PRECISION NEURO (MATCH HEAD)

## (undated) DEVICE — DUAL LUMEN URETERAL CATHETER

## (undated) DEVICE — COVER,TABLE,44X90,STERILE: Brand: MEDLINE

## (undated) DEVICE — DILATOR/SHEATH SET: Brand: 8/10 DILATOR/SHEATH SET

## (undated) DEVICE — COVER,LIGHT,CAMERA,HARD,1/PK,STRL: Brand: MEDLINE

## (undated) DEVICE — 20 ML SYRINGE LUER-LOCK TIP: Brand: MONOJECT

## (undated) DEVICE — 3M™ STERI-STRIP™ REINFORCED ADHESIVE SKIN CLOSURES, R1547, 1/2 IN X 4 IN (12 MM X 100 MM), 6 STRIPS/ENVELOPE: Brand: 3M™ STERI-STRIP™

## (undated) DEVICE — SINGLE-USE DIGITAL FLEXIBLE URETEROSCOPE: Brand: LITHOVUE

## (undated) DEVICE — MARKER SKIN PREP-RESISTANT ST: Brand: MEDLINE INDUSTRIES, INC.

## (undated) DEVICE — DISPOSABLE SLIM BIPOLAR FORCEPS, NON-STICK,: Brand: SPETZLER-MALIS

## (undated) DEVICE — SINGLE ACTION PUMPING SYSTEM

## (undated) DEVICE — ADHESIVE LIQ 2/3ML VI MASTISOL

## (undated) DEVICE — SUT MCRYL 3-0 27IN ABSRB UD 19MM PS-2 3/8

## (undated) DEVICE — ZEISS MD DRAPE

## (undated) DEVICE — ADAPTER BX SEAL Y BIOPSY PORT ADJ FOR HYSTEROSCOPE

## (undated) DEVICE — FIBER LSR 200UM 2J 80HZ 60W DL FOR LITHO

## (undated) DEVICE — NITINOL STONE RETRIEVAL BASKET: Brand: ZERO TIP

## (undated) DEVICE — TRAY CATH 16FR F INCL BARDX IC COMPLT CARE

## (undated) DEVICE — SOLUTION IV 1000ML 0.9% NACL PRESERVATIVE

## (undated) DEVICE — C-ARM: Brand: UNBRANDED

## (undated) DEVICE — GLOVE SUR 9 SENSICARE PI PIP CRM PWD F

## (undated) DEVICE — GOWN SUR 2XL LEV 4 BLU W/ WHT V NK BND AERO

## (undated) DEVICE — 3.5MM VARIABLE SCREW, SELF DRILLING, 16MM
Type: IMPLANTABLE DEVICE | Site: SPINE CERVICAL | Status: NON-FUNCTIONAL
Brand: SHORELINE ACS
Removed: 2025-05-29

## (undated) DEVICE — SPONGE,NEURO,0.5"X1.5",XR,STRL,LF,10/PK: Brand: MEDLINE

## (undated) DEVICE — #15 STERILE STAINLESS BLADE: Brand: STERILE STAINLESS BLADES

## (undated) DEVICE — Device

## (undated) DEVICE — SUT COAT VCRL 2-0 18IN CT-1 ABSRB UD CR 36MM

## (undated) DEVICE — GLOVE SUR 8 SENSICARE PI PIP CRM PWD F

## (undated) DEVICE — PACK PBDS CYSTOSCOPY

## (undated) DEVICE — GLOVE,SURG,SENSICARE,ALOE,LF,PF,9: Brand: MEDLINE

## (undated) DEVICE — URETERAL ACCESS SHEATH SET: Brand: NAVIGATOR HD

## (undated) DEVICE — FLEXIVA  PULSE  AND  FLEXIVA  PULSE  TRACTIP  LASER  FIBERS  ARE  HIGH  POWER  SINGLE-USE FIBER: Brand: FLEXIVA PULSE ID

## (undated) DEVICE — SEAL BIOPSY PORT ACMI BX BLACK CAP

## (undated) DEVICE — PAD,NON-ADHERENT,3X8,STERILE,LF,1/PK: Brand: MEDLINE

## (undated) DEVICE — LAMINECTOMY CDS: Brand: MEDLINE INDUSTRIES, INC.

## (undated) DEVICE — 4-PORT MANIFOLD: Brand: NEPTUNE 2

## (undated) DEVICE — GAUZE,SPONGE,USP,4"X4",12PLY,STRL,10/PK: Brand: MEDLINE INDUSTRIES, INC.

## (undated) DEVICE — PAD SACRAL SPAN AID

## (undated) DEVICE — BAG DRNGE 2000ML URIN INF CTRL ANTIREFLX

## (undated) DEVICE — SUT PERMA- 2-0 30IN NABSRB BLK TIE SILK

## (undated) DEVICE — SOLUTION IRRIG 1000ML 0.9% NACL USP BTL

## (undated) DEVICE — 3M™ TEGADERM™ TRANSPARENT FILM DRESSING FRAME STYLE, 1626W, 4 IN X 4-3/4 IN (10 CM X 12 CM), 50/CT 4CT/CASE: Brand: 3M™ TEGADERM™

## (undated) DEVICE — GLOVE SUR 8 SENSICARE PI PIP GRN PWD F

## (undated) DEVICE — INSULATED BLADE ELECTRODE: Brand: EDGE

## (undated) DEVICE — HEMOSTAT KIT SURGIFLO THROM 8ML

## (undated) DEVICE — MONITORING NEUROPHYSIOLOGICAL

## (undated) NOTE — LETTER
OUTSIDE TESTING RESULT REQUEST     IMPORTANT: FOR YOUR IMMEDIATE ATTENTION  Please FAX all test results listed below to: [phone]         * * * * If testing is NOT complete, arrange with patient A.S.A.P. * * * *      Patient Name: Юлия Charles  Surgery Date: 2025  Medical Record: [id number]  CSN: [id number]  : 1960 - A: 65 y     Sex: female  Surgeon(s):  YOVANA Gutierrez DO Heiferman, Daniel, MD  Procedure: anterior cervical 4-5, cervical 5-6, and cervical 6-7 discectomy and fusion with cage, bone graft, plate/screws (BECCA) lumboperitoneal shunt ligation (DIMITRI)  Anesthesia Type: General     Surgeon: YOVANA Gutierrez DO     The following Testing and Time Line are REQUIRED PER ANESTHESIA     EKG READ AND SIGNED WITHIN   90 days  Chest X-Ray within 90 days      Thank You,   Sent by: SARATH HAMEED

## (undated) NOTE — Clinical Note
To:  ***  Date:  2025  Patient Name: Юлия Charles-Age / Sex: 1960-A: 65 y  female  Medical Records: [id number]   CSN: [id number]      Clearance for Surgery Requested by Surgeon    Request for:  {STORMY HAMEED CLEARANCE:8651}    Requested by Surgeon: ***    Surgical Date: 2025    Procedure: anterior cervical 4- CERVICAL 5, cervical 5-CERVICAL 6, and cervical 6-CERVICAL 7 discectomy and fusion with cage, bone graft, plate/ screws; INTRAOPERATIVE NEURO MONITORING (BECCA) lumboperitoneal shunt ligation LEFT (DIMITRI), Right - Spine Cervical  INTRAOPERATIVE NEURO MONITORING, Right  lumboperitoneal shunt ligation, Left      Please fax the clearance note to the Pre-Admission Testing department.  Thank you.

## (undated) NOTE — LETTER
10/09/24      Юлия Charles   [address]  [place] [address]           Dear Юлия Charles     Our records indicate that you have outstanding lab work and/or testing that was ordered for you and has not yet been completed:  Lab Frequency Next Occurrence   XR DEXA BONE DENSITOMETRY (CPT=77080) Once 07/11/2024   CK (Creatine Kinase) (Not Creatinine) Once 08/28/2024   Lipid Panel Once 08/28/2024   Hepatic Function Panel (7) [E] Once 08/28/2024   CARD ECHO 2D DOPPLER (CPT=93306) Once 08/05/2024          To provide you with the best possible care, please complete these orders at your earliest convenience. If you have recently completed these orders please disregard this letter.   To schedule imaging, or outpatient tests please call Central Scheduling at [phone].  For any blood work needed, you can get this done at the Reference Lab in our [place] office anytime Monday-Friday from 7:30am-4:00pm and you do not need an appointment. They are closed for lunch between 12-1pm. They are closed Saturday and Sunday. If you need a time outside of these hours please call us to schedule an appointment.   *If you prefer to use [facility] for your labs please let us know so we can fax your orders.     Thank you,    [facility]

## (undated) NOTE — LETTER
[facility] Pre-Admission Testing Department  Phone: [phone]  Right Fax: [phone]    ADDRESSEE INFORMATION: SENDER INFORMATION:   To:   DR. POLLACK From: SARATH HAMEED     Department: Pre-Admission Testing   Fax Number: [phone] Date: 2025     Phone Number:  Phone Number: [phone]   Re: Patient Name: Юлия Charles  CSN: [id number]  Medical Record: [id number]   : 1960 - A: 65 y    Sex: female Fax Number: [phone]     Number of Pages (Including Cover Sheet)        Clearance for Surgery Requested by Surgeon      Request for:  Medical Clearance    Requested by ANESTHESIOLOGIST  HAS REQUESTED THE FOLLOWING:  NOTE OF MEDICAL CLEARANCE. Pt pre op lab w K+ 3.2  Surgical Date: 2025    Procedure: Right anterior cervical 4-cervical 5, cervical 5-cervical 6, and cervical 6-cervical 7 discectomy and fusion with cage, bone graft, plate/screws (BECCA) Lumboperitoneal shunt ligation left (DIMITRI), Right - Spine Cervical  INTRAOPERATIVE NEURO MONITORING, Right  lumboperitoneal shunt ligation, Left        Please fax the clearance note to the Pre-Admission Testing department [phone].    Thank you.            This message is intended only for the use of the individual or entity to which it is addressed. It may have been disclosed to you from records whose confidentiality is protected by Federal law and applicable state law. Federal Regulation, 45 C. F. R., part 164, prohibits you from making any further disclosure without specific authorization of the person to whom it pertains, or as otherwise permitted by such regulations. If the reader of this message is not the intended recipient, you are hereby notified that reading, disseminating, distributing or copying this communication is strictly prohibited. If you have received this communication in error, please immediately notify us by telephone and return the original message to us at [address] [place] [phone]  via the [place] Postal [employer].